# Patient Record
Sex: FEMALE | Race: WHITE | NOT HISPANIC OR LATINO | Employment: FULL TIME | URBAN - METROPOLITAN AREA
[De-identification: names, ages, dates, MRNs, and addresses within clinical notes are randomized per-mention and may not be internally consistent; named-entity substitution may affect disease eponyms.]

---

## 2017-02-19 ENCOUNTER — OFFICE VISIT (OUTPATIENT)
Dept: URGENT CARE | Facility: MEDICAL CENTER | Age: 52
End: 2017-02-19
Payer: COMMERCIAL

## 2017-02-19 PROCEDURE — G0382 LEV 3 HOSP TYPE B ED VISIT: HCPCS

## 2017-02-19 PROCEDURE — 99283 EMERGENCY DEPT VISIT LOW MDM: CPT

## 2017-04-24 ENCOUNTER — OFFICE VISIT (OUTPATIENT)
Dept: URGENT CARE | Facility: MEDICAL CENTER | Age: 52
End: 2017-04-24
Payer: COMMERCIAL

## 2017-04-24 PROCEDURE — G0382 LEV 3 HOSP TYPE B ED VISIT: HCPCS

## 2017-04-24 PROCEDURE — 99283 EMERGENCY DEPT VISIT LOW MDM: CPT

## 2017-05-24 ENCOUNTER — ALLSCRIPTS OFFICE VISIT (OUTPATIENT)
Dept: OTHER | Facility: OTHER | Age: 52
End: 2017-05-24

## 2019-04-24 ENCOUNTER — APPOINTMENT (EMERGENCY)
Dept: RADIOLOGY | Facility: HOSPITAL | Age: 54
End: 2019-04-24
Payer: COMMERCIAL

## 2019-04-24 ENCOUNTER — HOSPITAL ENCOUNTER (EMERGENCY)
Facility: HOSPITAL | Age: 54
Discharge: HOME | End: 2019-04-24
Attending: EMERGENCY MEDICINE
Payer: COMMERCIAL

## 2019-04-24 VITALS
OXYGEN SATURATION: 100 % | WEIGHT: 167.6 LBS | RESPIRATION RATE: 20 BRPM | TEMPERATURE: 98.5 F | SYSTOLIC BLOOD PRESSURE: 180 MMHG | DIASTOLIC BLOOD PRESSURE: 84 MMHG | HEART RATE: 83 BPM | BODY MASS INDEX: 29.69 KG/M2

## 2019-04-24 DIAGNOSIS — M54.9 MUSCULOSKELETAL BACK PAIN: ICD-10-CM

## 2019-04-24 DIAGNOSIS — S29.012A STRAIN OF THORACIC SPINE: Primary | ICD-10-CM

## 2019-04-24 PROCEDURE — 99284 EMERGENCY DEPT VISIT MOD MDM: CPT | Performed by: PHYSICIAN ASSISTANT

## 2019-04-24 PROCEDURE — 72070 X-RAY EXAM THORAC SPINE 2VWS: CPT

## 2019-04-24 PROCEDURE — 71046 X-RAY EXAM CHEST 2 VIEWS: CPT

## 2019-04-24 PROCEDURE — 96372 THER/PROPH/DIAG INJ SC/IM: CPT

## 2019-04-24 PROCEDURE — 99283 EMERGENCY DEPT VISIT LOW MDM: CPT

## 2019-04-24 RX ORDER — CYCLOBENZAPRINE HCL 10 MG
10 TABLET ORAL ONCE
Status: COMPLETED | OUTPATIENT
Start: 2019-04-24 | End: 2019-04-24

## 2019-04-24 RX ORDER — PREDNISONE 20 MG/1
60 TABLET ORAL ONCE
Status: COMPLETED | OUTPATIENT
Start: 2019-04-24 | End: 2019-04-24

## 2019-04-24 RX ORDER — KETOROLAC TROMETHAMINE 30 MG/ML
30 INJECTION, SOLUTION INTRAMUSCULAR; INTRAVENOUS ONCE
Status: COMPLETED | OUTPATIENT
Start: 2019-04-24 | End: 2019-04-24

## 2019-04-24 RX ORDER — OMEPRAZOLE 20 MG/1
20 CAPSULE, DELAYED RELEASE ORAL 2 TIMES DAILY
COMMUNITY
Start: 2018-03-15 | End: 2021-01-15 | Stop reason: SDUPTHER

## 2019-04-24 RX ORDER — FEXOFENADINE HCL 180 MG/1
180 TABLET ORAL DAILY
COMMUNITY

## 2019-04-24 RX ORDER — ESCITALOPRAM OXALATE 20 MG/1
20 TABLET ORAL ONCE
COMMUNITY
Start: 2018-10-15

## 2019-04-24 RX ORDER — PREDNISONE 20 MG/1
40 TABLET ORAL DAILY
Qty: 8 TABLET | Refills: 0 | Status: SHIPPED | OUTPATIENT
Start: 2019-04-24 | End: 2019-04-28

## 2019-04-24 RX ORDER — CYCLOBENZAPRINE HCL 5 MG
TABLET ORAL
Qty: 12 TABLET | Refills: 0 | Status: SHIPPED | OUTPATIENT
Start: 2019-04-24

## 2019-04-24 RX ADMIN — PREDNISONE 60 MG: 20 TABLET ORAL at 21:16

## 2019-04-24 RX ADMIN — KETOROLAC TROMETHAMINE 30 MG: 30 INJECTION, SOLUTION INTRAMUSCULAR; INTRAVENOUS at 21:17

## 2019-04-24 RX ADMIN — CYCLOBENZAPRINE HYDROCHLORIDE 10 MG: 10 TABLET, FILM COATED ORAL at 21:17

## 2019-04-25 ENCOUNTER — TELEPHONE (OUTPATIENT)
Dept: PHYSICAL THERAPY | Facility: OTHER | Age: 54
End: 2019-04-25

## 2019-04-30 ENCOUNTER — TELEPHONE (OUTPATIENT)
Dept: PHYSICAL THERAPY | Facility: OTHER | Age: 54
End: 2019-04-30

## 2020-11-11 ENCOUNTER — OFFICE VISIT (OUTPATIENT)
Dept: URGENT CARE | Age: 55
End: 2020-11-11
Payer: COMMERCIAL

## 2020-11-11 VITALS
TEMPERATURE: 98 F | SYSTOLIC BLOOD PRESSURE: 154 MMHG | DIASTOLIC BLOOD PRESSURE: 74 MMHG | OXYGEN SATURATION: 98 % | RESPIRATION RATE: 18 BRPM | HEART RATE: 62 BPM

## 2020-11-11 DIAGNOSIS — S05.02XA ABRASION OF LEFT CORNEA, INITIAL ENCOUNTER: Primary | ICD-10-CM

## 2020-11-11 PROCEDURE — 99283 EMERGENCY DEPT VISIT LOW MDM: CPT | Performed by: PHYSICIAN ASSISTANT

## 2020-11-11 PROCEDURE — G0382 LEV 3 HOSP TYPE B ED VISIT: HCPCS | Performed by: PHYSICIAN ASSISTANT

## 2020-11-11 RX ORDER — OFLOXACIN 3 MG/ML
1 SOLUTION/ DROPS OPHTHALMIC 4 TIMES DAILY
Qty: 1.4 ML | Refills: 0 | Status: SHIPPED | OUTPATIENT
Start: 2020-11-11 | End: 2020-11-18

## 2021-01-15 ENCOUNTER — APPOINTMENT (EMERGENCY)
Dept: CT IMAGING | Facility: HOSPITAL | Age: 56
End: 2021-01-15
Payer: COMMERCIAL

## 2021-01-15 ENCOUNTER — HOSPITAL ENCOUNTER (EMERGENCY)
Facility: HOSPITAL | Age: 56
Discharge: HOME | End: 2021-01-15
Attending: EMERGENCY MEDICINE | Admitting: EMERGENCY MEDICINE
Payer: COMMERCIAL

## 2021-01-15 VITALS
DIASTOLIC BLOOD PRESSURE: 56 MMHG | RESPIRATION RATE: 16 BRPM | SYSTOLIC BLOOD PRESSURE: 131 MMHG | TEMPERATURE: 98.6 F | OXYGEN SATURATION: 99 % | HEART RATE: 78 BPM

## 2021-01-15 DIAGNOSIS — R19.7 ACUTE DIARRHEA: Primary | ICD-10-CM

## 2021-01-15 DIAGNOSIS — R10.9 ABDOMINAL PAIN: ICD-10-CM

## 2021-01-15 LAB
ALBUMIN SERPL BCP-MCNC: 3.3 G/DL (ref 3.5–5)
ALP SERPL-CCNC: 113 U/L (ref 46–116)
ALT SERPL W P-5'-P-CCNC: 19 U/L (ref 12–78)
ANION GAP SERPL CALCULATED.3IONS-SCNC: 6 MMOL/L (ref 4–13)
AST SERPL W P-5'-P-CCNC: 15 U/L (ref 5–45)
BASOPHILS # BLD AUTO: 0.07 THOUSANDS/ΜL (ref 0–0.1)
BASOPHILS NFR BLD AUTO: 1 % (ref 0–1)
BILIRUB SERPL-MCNC: 0.26 MG/DL (ref 0.2–1)
BILIRUB UR QL STRIP: NEGATIVE
BUN SERPL-MCNC: 12 MG/DL (ref 5–25)
CALCIUM ALBUM COR SERPL-MCNC: 9.5 MG/DL (ref 8.3–10.1)
CALCIUM SERPL-MCNC: 8.9 MG/DL (ref 8.3–10.1)
CHLORIDE SERPL-SCNC: 105 MMOL/L (ref 100–108)
CLARITY UR: CLEAR
CO2 SERPL-SCNC: 29 MMOL/L (ref 21–32)
COLOR UR: YELLOW
CREAT SERPL-MCNC: 0.79 MG/DL (ref 0.6–1.3)
EOSINOPHIL # BLD AUTO: 0.22 THOUSAND/ΜL (ref 0–0.61)
EOSINOPHIL NFR BLD AUTO: 3 % (ref 0–6)
ERYTHROCYTE [DISTWIDTH] IN BLOOD BY AUTOMATED COUNT: 12.5 % (ref 11.6–15.1)
GFR SERPL CREATININE-BSD FRML MDRD: 85 ML/MIN/1.73SQ M
GLUCOSE SERPL-MCNC: 98 MG/DL (ref 65–140)
GLUCOSE UR STRIP-MCNC: NEGATIVE MG/DL
HCT VFR BLD AUTO: 41.3 % (ref 34.8–46.1)
HGB BLD-MCNC: 13.7 G/DL (ref 11.5–15.4)
HGB UR QL STRIP.AUTO: NEGATIVE
IMM GRANULOCYTES # BLD AUTO: 0.02 THOUSAND/UL (ref 0–0.2)
IMM GRANULOCYTES NFR BLD AUTO: 0 % (ref 0–2)
KETONES UR STRIP-MCNC: NEGATIVE MG/DL
LEUKOCYTE ESTERASE UR QL STRIP: NEGATIVE
LIPASE SERPL-CCNC: 97 U/L (ref 73–393)
LYMPHOCYTES # BLD AUTO: 2.33 THOUSANDS/ΜL (ref 0.6–4.47)
LYMPHOCYTES NFR BLD AUTO: 29 % (ref 14–44)
MCH RBC QN AUTO: 31.2 PG (ref 26.8–34.3)
MCHC RBC AUTO-ENTMCNC: 33.2 G/DL (ref 31.4–37.4)
MCV RBC AUTO: 94 FL (ref 82–98)
MONOCYTES # BLD AUTO: 0.68 THOUSAND/ΜL (ref 0.17–1.22)
MONOCYTES NFR BLD AUTO: 8 % (ref 4–12)
NEUTROPHILS # BLD AUTO: 4.84 THOUSANDS/ΜL (ref 1.85–7.62)
NEUTS SEG NFR BLD AUTO: 59 % (ref 43–75)
NITRITE UR QL STRIP: NEGATIVE
NRBC BLD AUTO-RTO: 0 /100 WBCS
PH UR STRIP.AUTO: 5.5 [PH] (ref 4.5–8)
PLATELET # BLD AUTO: 294 THOUSANDS/UL (ref 149–390)
PMV BLD AUTO: 10.5 FL (ref 8.9–12.7)
POTASSIUM SERPL-SCNC: 4.1 MMOL/L (ref 3.5–5.3)
PROT SERPL-MCNC: 7.3 G/DL (ref 6.4–8.2)
PROT UR STRIP-MCNC: NEGATIVE MG/DL
RBC # BLD AUTO: 4.39 MILLION/UL (ref 3.81–5.12)
SODIUM SERPL-SCNC: 140 MMOL/L (ref 136–145)
SP GR UR STRIP.AUTO: 1.02 (ref 1–1.03)
UROBILINOGEN UR QL STRIP.AUTO: 0.2 E.U./DL
WBC # BLD AUTO: 8.16 THOUSAND/UL (ref 4.31–10.16)

## 2021-01-15 PROCEDURE — 99284 EMERGENCY DEPT VISIT MOD MDM: CPT | Performed by: EMERGENCY MEDICINE

## 2021-01-15 PROCEDURE — 36415 COLL VENOUS BLD VENIPUNCTURE: CPT | Performed by: EMERGENCY MEDICINE

## 2021-01-15 PROCEDURE — 99284 EMERGENCY DEPT VISIT MOD MDM: CPT

## 2021-01-15 PROCEDURE — 85025 COMPLETE CBC W/AUTO DIFF WBC: CPT | Performed by: EMERGENCY MEDICINE

## 2021-01-15 PROCEDURE — 80053 COMPREHEN METABOLIC PANEL: CPT | Performed by: EMERGENCY MEDICINE

## 2021-01-15 PROCEDURE — 96374 THER/PROPH/DIAG INJ IV PUSH: CPT

## 2021-01-15 PROCEDURE — 81003 URINALYSIS AUTO W/O SCOPE: CPT

## 2021-01-15 PROCEDURE — 96361 HYDRATE IV INFUSION ADD-ON: CPT

## 2021-01-15 PROCEDURE — 83690 ASSAY OF LIPASE: CPT | Performed by: EMERGENCY MEDICINE

## 2021-01-15 PROCEDURE — 74177 CT ABD & PELVIS W/CONTRAST: CPT

## 2021-01-15 RX ORDER — ONDANSETRON 2 MG/ML
4 INJECTION INTRAMUSCULAR; INTRAVENOUS ONCE
Status: COMPLETED | OUTPATIENT
Start: 2021-01-15 | End: 2021-01-15

## 2021-01-15 RX ORDER — OMEPRAZOLE 20 MG/1
20 CAPSULE, DELAYED RELEASE ORAL 2 TIMES DAILY
Qty: 60 CAPSULE | Refills: 0 | Status: SHIPPED | OUTPATIENT
Start: 2021-01-15 | End: 2021-12-06

## 2021-01-15 RX ORDER — DICYCLOMINE HCL 20 MG
20 TABLET ORAL 2 TIMES DAILY
Qty: 20 TABLET | Refills: 0 | Status: SHIPPED | OUTPATIENT
Start: 2021-01-15

## 2021-01-15 RX ADMIN — IOHEXOL 100 ML: 350 INJECTION, SOLUTION INTRAVENOUS at 16:23

## 2021-01-15 RX ADMIN — SODIUM CHLORIDE 1000 ML: 0.9 INJECTION, SOLUTION INTRAVENOUS at 15:18

## 2021-01-15 RX ADMIN — ONDANSETRON 4 MG: 2 INJECTION INTRAMUSCULAR; INTRAVENOUS at 15:19

## 2021-01-15 NOTE — ED PROVIDER NOTES
History  Chief Complaint   Patient presents with    Diarrhea     starting last tuesday for 3 days and has been having lots of gas and 1 episopde of diarrhea again this am       81-year-old female comes in for evaluation diarrhea and abdominal pain  Patient states since last Tuesday she has been having lower abdominal pain and muscle episodes of loose stool  Patient denies any vomiting she does have some nausea no fever or chills denies any previous similar episodes although she does have a history of IBS  She also has a history of hiatal hernia  Of note patient is on doxycycline for URI however diarrhea started for the antibiotics      History provided by:  Patient   used: No    Diarrhea  Quality:  Explosive and watery  Severity:  Severe  Onset quality:  Sudden  Duration:  3 days  Timing:  Constant  Progression:  Worsening  Ineffective treatments:  None tried  Associated symptoms: abdominal pain    Associated symptoms: no arthralgias, no diaphoresis, no fever, no headaches and no vomiting    Risk factors: recent antibiotic use (Diarrhea started before the antibiotics antibiotics for URI)        Prior to Admission Medications   Prescriptions Last Dose Informant Patient Reported? Taking?    cyclobenzaprine (FLEXERIL) 5 mg tablet   No No   Si-2 PO TID PRN   escitalopram (LEXAPRO) 20 mg tablet   Yes No   Sig: Take 20 mg by mouth once   fexofenadine (ALLEGRA ALLERGY) 180 MG tablet  Self Yes No   Sig: Take 180 mg by mouth daily    omeprazole (PRILOSEC) 20 mg delayed release capsule   Yes No   Sig: Take 20 mg by mouth 2 (two) times a day   omeprazole (PriLOSEC) 20 mg delayed release capsule   No No   Sig: Take 1 capsule (20 mg total) by mouth 2 (two) times a day      Facility-Administered Medications: None       Past Medical History:   Diagnosis Date    Asthma     Papanicolaou smear 2019    Neg       Past Surgical History:   Procedure Laterality Date    BREAST SURGERY Left      SECTION      COLONOSCOPY  2016    DILATION AND CURETTAGE OF UTERUS      X2    ENDOMETRIAL ABLATION      MAMMO (HISTORICAL)  08/2019    SINUS SURGERY      X2    TUBAL LIGATION  1993       Family History   Problem Relation Age of Onset    Brain cancer Father     Lung cancer Father     Cancer Father         Pharynx    Breast cancer Other      I have reviewed and agree with the history as documented  E-Cigarette/Vaping     E-Cigarette/Vaping Substances     Social History     Tobacco Use    Smoking status: Current Every Day Smoker     Packs/day: 1 00    Smokeless tobacco: Never Used   Substance Use Topics    Alcohol use: Not Currently    Drug use: Never       Review of Systems   Constitutional: Negative for diaphoresis, fatigue and fever  HENT: Negative for congestion and ear pain  Eyes: Negative for discharge and redness  Respiratory: Negative for apnea, cough, shortness of breath and wheezing  Cardiovascular: Negative for chest pain  Gastrointestinal: Positive for abdominal pain and diarrhea  Negative for vomiting  Endocrine: Negative for cold intolerance and polydipsia  Genitourinary: Negative for difficulty urinating and hematuria  Musculoskeletal: Negative for arthralgias and back pain  Skin: Negative for color change and rash  Allergic/Immunologic: Negative for environmental allergies and immunocompromised state  Neurological: Negative for numbness and headaches  Hematological: Negative for adenopathy  Does not bruise/bleed easily  Psychiatric/Behavioral: Negative for agitation and behavioral problems  Physical Exam  Physical Exam  Vitals signs and nursing note reviewed  Constitutional:       Appearance: Normal appearance  She is well-developed  She is not toxic-appearing  HENT:      Head: Normocephalic and atraumatic        Right Ear: Tympanic membrane and external ear normal       Left Ear: Tympanic membrane and external ear normal       Nose: Nose normal  No nasal deformity or rhinorrhea  Mouth/Throat:      Dentition: Normal dentition  Pharynx: Uvula midline  Eyes:      General: Lids are normal          Right eye: No discharge  Left eye: No discharge  Conjunctiva/sclera: Conjunctivae normal       Pupils: Pupils are equal, round, and reactive to light  Neck:      Musculoskeletal: Normal range of motion and neck supple  Vascular: No carotid bruit or JVD  Trachea: Trachea normal    Cardiovascular:      Rate and Rhythm: Normal rate and regular rhythm  No extrasystoles are present  Chest Wall: PMI is not displaced  Pulses: Normal pulses  Pulmonary:      Effort: Pulmonary effort is normal  No accessory muscle usage or respiratory distress  Breath sounds: Normal breath sounds  No wheezing, rhonchi or rales  Abdominal:      General: Bowel sounds are normal       Palpations: Abdomen is soft  Abdomen is not rigid  There is no mass  Tenderness: There is no abdominal tenderness  There is no guarding or rebound  Musculoskeletal:      Right shoulder: She exhibits normal range of motion, no bony tenderness, no swelling and no deformity  Cervical back: Normal  She exhibits normal range of motion, no tenderness, no bony tenderness and no deformity  Lymphadenopathy:      Cervical: No cervical adenopathy  Skin:     General: Skin is warm and dry  Findings: No rash  Neurological:      Mental Status: She is alert and oriented to person, place, and time  GCS: GCS eye subscore is 4  GCS verbal subscore is 5  GCS motor subscore is 6  Cranial Nerves: No cranial nerve deficit  Sensory: No sensory deficit  Deep Tendon Reflexes: Reflexes are normal and symmetric     Psychiatric:         Speech: Speech normal          Behavior: Behavior normal          Vital Signs  ED Triage Vitals [01/15/21 1336]   Temperature Pulse Respirations Blood Pressure SpO2   98 6 °F (37 °C) 78 16 131/56 99 %      Temp Source Heart Rate Source Patient Position - Orthostatic VS BP Location FiO2 (%)   Temporal -- -- Left arm --      Pain Score       7           Vitals:    01/15/21 1336   BP: 131/56   Pulse: 78         Visual Acuity      ED Medications  Medications   ondansetron (ZOFRAN) injection 4 mg (4 mg Intravenous Given 1/15/21 1519)   sodium chloride 0 9 % bolus 1,000 mL (1,000 mL Intravenous New Bag 1/15/21 1518)   iohexol (OMNIPAQUE) 350 MG/ML injection (SINGLE-DOSE) 100 mL (100 mL Intravenous Given 1/15/21 1623)       Diagnostic Studies  Results Reviewed     Procedure Component Value Units Date/Time    Comprehensive metabolic panel [[id number]]  (Abnormal) Collected: 01/15/21 1517    Lab Status: Final result Specimen: Blood from Arm, Left Updated: 01/15/21 1558     Sodium 140 mmol/L      Potassium 4 1 mmol/L      Chloride 105 mmol/L      CO2 29 mmol/L      ANION GAP 6 mmol/L      BUN 12 mg/dL      Creatinine 0 79 mg/dL      Glucose 98 mg/dL      Calcium 8 9 mg/dL      Corrected Calcium 9 5 mg/dL      AST 15 U/L      ALT 19 U/L      Alkaline Phosphatase 113 U/L      Total Protein 7 3 g/dL      Albumin 3 3 g/dL      Total Bilirubin 0 26 mg/dL      eGFR 85 ml/min/1 73sq m     Narrative:      [place] guidelines for Chronic Kidney Disease (CKD):     Stage 1 with normal or high GFR (GFR > 90 mL/min/1 73 square meters)    Stage 2 Mild CKD (GFR = 60-89 mL/min/1 73 square meters)    Stage 3A Moderate CKD (GFR = 45-59 mL/min/1 73 square meters)    Stage 3B Moderate CKD (GFR = 30-44 mL/min/1 73 square meters)    Stage 4 Severe CKD (GFR = 15-29 mL/min/1 73 square meters)    Stage 5 End Stage CKD (GFR <15 mL/min/1 73 square meters)  Note: GFR calculation is accurate only with a steady state creatinine    Lipase [[id number]]  (Normal) Collected: 01/15/21 1517    Lab Status: Final result Specimen: Blood from Arm, Left Updated: 01/15/21 1558     Lipase 97 u/L     CBC and differential [[id number]] Collected: 01/15/21 1517    Lab Status: Final result Specimen: Blood from Arm, Left Updated: 01/15/21 1537     WBC 8 16 Thousand/uL      RBC 4 39 Million/uL      Hemoglobin 13 7 g/dL      Hematocrit 41 3 %      MCV 94 fL      MCH 31 2 pg      MCHC 33 2 g/dL      RDW 12 5 %      MPV 10 5 fL      Platelets 100 Thousands/uL      nRBC 0 /100 WBCs      Neutrophils Relative 59 %      Immat GRANS % 0 %      Lymphocytes Relative 29 %      Monocytes Relative 8 %      Eosinophils Relative 3 %      Basophils Relative 1 %      Neutrophils Absolute 4 84 Thousands/µL      Immature Grans Absolute 0 02 Thousand/uL      Lymphocytes Absolute 2 33 Thousands/µL      Monocytes Absolute 0 68 Thousand/µL      Eosinophils Absolute 0 22 Thousand/µL      Basophils Absolute 0 07 Thousands/µL     Urine Macroscopic, POC [[id number]] Collected: 01/15/21 1534    Lab Status: Final result Specimen: Urine Updated: 01/15/21 1536     Color, UA Yellow     Clarity, UA Clear     pH, UA 5 5     Leukocytes, UA Negative     Nitrite, UA Negative     Protein, UA Negative mg/dl      Glucose, UA Negative mg/dl      Ketones, UA Negative mg/dl      Urobilinogen, UA 0 2 E U /dl      Bilirubin, UA Negative     Blood, UA Negative     Specific Gravity, UA 1 020    Narrative:      CLINITEK RESULT    Clostridium difficile toxin by PCR with EIA [[id number]]     Lab Status: No result Specimen: Stool     Stool Enteric Bacterial Panel by PCR [[id number]]     Lab Status: No result Specimen: Stool                  CT abdomen pelvis with contrast   Final Result by Stephanie Mejia DO (01/15 1645)      No acute intra-abdominal abnormality  Colonic diverticulosis without evidence of diverticulitis  Hepatomegaly              Workstation performed: [id number]                    Procedures  Procedures         ED Course                                           MDM  Number of Diagnoses or Management Options  Abdominal pain: new and requires workup  Acute diarrhea: new and requires workup Amount and/or Complexity of Data Reviewed  Clinical lab tests: ordered and reviewed  Tests in the radiology section of CPT®: ordered and reviewed  Tests in the medicine section of CPT®: ordered and reviewed  Independent visualization of images, tracings, or specimens: yes    Patient Progress  Patient progress: stable      Disposition  Final diagnoses:   Acute diarrhea   Abdominal pain     Time reflects when diagnosis was documented in both MDM as applicable and the Disposition within this note     Time User Action Codes Description Comment    1/15/2021  4:48 PM Susan Sheila DEAL Add [R19 7] Acute diarrhea     1/15/2021  4:48 PM Maria Enamorado Add [R10 9] Abdominal pain       ED Disposition     ED Disposition Condition Date/Time Comment    Discharge Stable Fri Alfred 15, 2021  4:48 PM Linda Aceves discharge to home/self care  Follow-up Information     Follow up With Specialties Details Why Contact Info    Eliz Barrera MD Internal Medicine Schedule an appointment as soon as possible for a visit   [phone] [place] [address]  [phone]            Patient's Medications   Discharge Prescriptions    DICYCLOMINE (BENTYL) 20 MG TABLET    Take 1 tablet (20 mg total) by mouth 2 (two) times a day       Start Date: 1/15/2021 End Date: --       Order Dose: 20 mg       Quantity: 20 tablet    Refills: 0     No discharge procedures on file      PDMP Review     None          ED Provider  Electronically Signed by           Cory Teixeira DO  01/15/21 7705

## 2021-04-27 ENCOUNTER — OFFICE VISIT (OUTPATIENT)
Dept: URGENT CARE | Age: 56
End: 2021-04-27
Payer: COMMERCIAL

## 2021-04-27 VITALS
DIASTOLIC BLOOD PRESSURE: 102 MMHG | SYSTOLIC BLOOD PRESSURE: 180 MMHG | RESPIRATION RATE: 20 BRPM | OXYGEN SATURATION: 99 % | HEART RATE: 72 BPM | TEMPERATURE: 97 F

## 2021-04-27 DIAGNOSIS — M77.12 LATERAL EPICONDYLITIS OF LEFT ELBOW: Primary | ICD-10-CM

## 2021-04-27 PROCEDURE — G0382 LEV 3 HOSP TYPE B ED VISIT: HCPCS | Performed by: NURSE PRACTITIONER

## 2021-04-27 RX ORDER — PREDNISONE 20 MG/1
TABLET ORAL
Qty: 18 TABLET | Refills: 0 | Status: SHIPPED | OUTPATIENT
Start: 2021-04-27 | End: 2021-12-06 | Stop reason: ALTCHOICE

## 2021-04-27 RX ORDER — ATORVASTATIN CALCIUM 20 MG/1
20 TABLET, FILM COATED ORAL DAILY
COMMUNITY
Start: 2021-03-23 | End: 2022-03-23

## 2021-04-27 RX ORDER — FENOFIBRATE 67 MG/1
67 CAPSULE ORAL DAILY
COMMUNITY
Start: 2021-03-23 | End: 2022-03-23

## 2021-04-27 RX ORDER — VENLAFAXINE HYDROCHLORIDE 75 MG/1
37.5 CAPSULE, EXTENDED RELEASE ORAL DAILY
COMMUNITY
Start: 2021-03-11 | End: 2022-03-11

## 2021-04-27 RX ORDER — ASPIRIN 81 MG/1
81 TABLET, CHEWABLE ORAL DAILY
COMMUNITY
Start: 2021-03-23 | End: 2022-03-23

## 2021-04-27 RX ORDER — AMLODIPINE BESYLATE 5 MG/1
5 TABLET ORAL DAILY
COMMUNITY
Start: 2021-03-23 | End: 2022-03-23

## 2021-04-27 RX ORDER — HYDROXYZINE PAMOATE 50 MG/1
50 CAPSULE ORAL
COMMUNITY
Start: 2021-04-12 | End: 2021-05-12

## 2021-04-27 NOTE — PATIENT INSTRUCTIONS
Tennis Elbow   WHAT YOU NEED TO KNOW:   Tennis elbow is inflammation of the tendons in your elbow  Tendons are strong tissues that connect muscle to bone  DISCHARGE INSTRUCTIONS:   Return to the emergency department if:   · You suddenly have no feeling in your arm, hand, or fingers  · You suddenly cannot move your arm, wrist, hand, or fingers  Contact your healthcare provider if:   · You have a fever  · You have more pain or weakness in your arm, wrist, hand, or fingers  · You have new numbness or tingling in your arm, hand, or fingers  · You have questions or concerns about your condition or care  Medicines:   · Acetaminophen  decreases pain and fever  It is available without a doctor's order  Ask how much to take and how often to take it  Follow directions  Read the labels of all other medicines you are using to see if they also contain acetaminophen, or ask your doctor or pharmacist  Acetaminophen can cause liver damage if not taken correctly  Do not use more than 4 grams (4,000 milligrams) total of acetaminophen in one day  · NSAIDs , such as ibuprofen, help decrease swelling, pain, and fever  This medicine is available with or without a doctor's order  NSAIDs can cause stomach bleeding or kidney problems in certain people  If you take blood thinner medicine, always ask your healthcare provider if NSAIDs are safe for you  Always read the medicine label and follow directions  · Take your medicine as directed  Contact your healthcare provider if you think your medicine is not helping or if you have side effects  Tell him or her if you are allergic to any medicine  Keep a list of the medicines, vitamins, and herbs you take  Include the amounts, and when and why you take them  Bring the list or the pill bottles to follow-up visits  Carry your medicine list with you in case of an emergency      Physical therapy:  A physical therapist teaches you exercises to help improve movement and strength, and to decrease pain  Self-care:   · Wear your support device as directed  Devices, such as an arm strap, brace, or splint, help limit your arm movement  They also decrease pain and help prevent more damage to your tendon  Ask your healthcare provider how to care for your arm while you wear a brace or splint  · Rest your injured arm  and avoid activities that cause pain  This will help your tendons heal     · Apply ice on your elbow  for 15 to 20 minutes every hour or as directed  Use an ice pack, or put crushed ice in a plastic bag  Cover it with a towel before you apply it to your skin  Ice helps prevent tissue damage and decreases swelling and pain  · Elevate your elbow  above the level of your heart as often as you can  This will help decrease swelling and pain  Prop your elbow on pillows or blankets to keep it elevated comfortably  Follow up with your healthcare provider as directed:  Write down your questions so you remember to ask them during your visits  © Copyright [address] Information is for End User's use only and may not be sold, redistributed or otherwise used for commercial purposes  All illustrations and images included in CareNotes® are the copyrighted property of A D A M , Inc  or [facility] Aleyda Toledo   The above information is an  only  It is not intended as medical advice for individual conditions or treatments  Talk to your doctor, nurse or pharmacist before following any medical regimen to see if it is safe and effective for you

## 2021-04-27 NOTE — PROGRESS NOTES
[facility]        NAME: Eliel Ovalle is a 55 y o  female  : 1965    MRN: [id number]  DATE: 2021  TIME: 4:33 PM    Assessment and Plan   Lateral epicondylitis of left elbow [M77 12]  1  Lateral epicondylitis of left elbow  Brace    predniSONE 20 mg tablet         Patient Instructions     Elbow brace applied  Take med as directed  Follow up with PCP in 3-5 days  Proceed to  ER if symptoms worsen  Chief Complaint     Chief Complaint   Patient presents with    Arm Pain     pt states having left elbow pain for 2 weeks, getting worse, pain increases with movement of left hand         History of Present Illness       HPI   Reports left elbow pain x 2-3 weeks  Says the pain started while at work  She works at a clothing store  Lots of repetitive movement  Pain is of moderate to severe intensity, worse with use of the left arm, better with rest  Pain increases with both fine and gross movements  Review of Systems   Review of Systems   Constitutional: Negative for chills and fever  Musculoskeletal: Positive for arthralgias (left elbow) and myalgias (left arm)  Negative for joint swelling  Skin: Negative for color change, rash and wound  Neurological: Positive for weakness (bc of pain of the left arm)  Negative for numbness           Current Medications       Current Outpatient Medications:     amLODIPine (NORVASC) 5 mg tablet, Take 5 mg by mouth daily, Disp: , Rfl:     aspirin 81 mg chewable tablet, Chew 81 mg daily, Disp: , Rfl:     atorvastatin (LIPITOR) 20 mg tablet, Take 20 mg by mouth daily, Disp: , Rfl:     dicyclomine (BENTYL) 20 mg tablet, Take 1 tablet (20 mg total) by mouth 2 (two) times a day, Disp: 20 tablet, Rfl: 0    fenofibrate micronized (LOFIBRA) 67 MG capsule, Take 67 mg by mouth daily, Disp: , Rfl:     fexofenadine (ALLEGRA ALLERGY) 180 MG tablet, Take 180 mg by mouth daily , Disp: , Rfl:     hydrOXYzine pamoate (VISTARIL) 50 mg capsule, Take 50 mg by mouth, Disp: , Rfl:     venlafaxine (EFFEXOR-XR) 37 5 mg 24 hr capsule, Take 37 5 mg by mouth daily, Disp: , Rfl:     cyclobenzaprine (FLEXERIL) 5 mg tablet, 1-2 PO TID PRN (Patient not taking: Reported on 2021), Disp: 12 tablet, Rfl: 0    escitalopram (LEXAPRO) 20 mg tablet, Take 20 mg by mouth once, Disp: , Rfl:     omeprazole (PriLOSEC) 20 mg delayed release capsule, Take 1 capsule (20 mg total) by mouth 2 (two) times a day, Disp: 60 capsule, Rfl: 0    predniSONE 20 mg tablet, 1 tab TID x 3 days, then BID x 3 days, then daily x 3 days, Disp: 18 tablet, Rfl: 0    Current Allergies     Allergies as of 2021 - Reviewed 2021   Allergen Reaction Noted    Cefuroxime Hives 2013            The following portions of the patient's history were reviewed and updated as appropriate: allergies, current medications, past family history, past medical history, past social history, past surgical history and problem list      Past Medical History:   Diagnosis Date    Asthma     Papanicolaou smear 2019    Neg       Past Surgical History:   Procedure Laterality Date    BREAST SURGERY Left      SECTION      COLONOSCOPY      DILATION AND CURETTAGE OF UTERUS      X2    ENDOMETRIAL ABLATION      MAMMO (HISTORICAL)  2019    SINUS SURGERY      X2    TUBAL LIGATION         Family History   Problem Relation Age of Onset    Brain cancer Father     Lung cancer Father     Cancer Father         Pharynx    Breast cancer Other          Medications have been verified  Objective   BP (!) 180/102   Pulse 72   Temp (!) 97 °F (36 1 °C)   Resp 20   SpO2 99%   No LMP recorded  Patient is postmenopausal        Physical Exam     Physical Exam  Constitutional:       Appearance: She is not ill-appearing or diaphoretic  Musculoskeletal:         General: Tenderness (with palpation of the left forearm and left elbow muscles, consistent with tennis elbow) present  No swelling     Skin: Coloration: Skin is not jaundiced  Findings: No bruising, lesion or rash  Neurological:      Mental Status: She is alert

## 2021-05-03 ENCOUNTER — APPOINTMENT (OUTPATIENT)
Dept: URGENT CARE | Age: 56
End: 2021-05-03
Payer: OTHER MISCELLANEOUS

## 2021-05-03 PROCEDURE — G0382 LEV 3 HOSP TYPE B ED VISIT: HCPCS | Performed by: PHYSICIAN ASSISTANT

## 2021-05-03 PROCEDURE — 99283 EMERGENCY DEPT VISIT LOW MDM: CPT | Performed by: PHYSICIAN ASSISTANT

## 2021-05-10 ENCOUNTER — APPOINTMENT (OUTPATIENT)
Dept: URGENT CARE | Age: 56
End: 2021-05-10
Payer: OTHER MISCELLANEOUS

## 2021-05-10 PROCEDURE — 99213 OFFICE O/P EST LOW 20 MIN: CPT | Performed by: NURSE PRACTITIONER

## 2021-05-14 ENCOUNTER — APPOINTMENT (OUTPATIENT)
Dept: URGENT CARE | Age: 56
End: 2021-05-14
Payer: OTHER MISCELLANEOUS

## 2021-05-14 PROCEDURE — 99213 OFFICE O/P EST LOW 20 MIN: CPT | Performed by: PHYSICIAN ASSISTANT

## 2021-05-24 ENCOUNTER — APPOINTMENT (OUTPATIENT)
Dept: URGENT CARE | Age: 56
End: 2021-05-24
Payer: OTHER MISCELLANEOUS

## 2021-05-24 PROCEDURE — 99213 OFFICE O/P EST LOW 20 MIN: CPT | Performed by: PHYSICIAN ASSISTANT

## 2021-06-03 ENCOUNTER — APPOINTMENT (OUTPATIENT)
Dept: URGENT CARE | Age: 56
End: 2021-06-03
Payer: OTHER MISCELLANEOUS

## 2021-06-03 PROCEDURE — 99213 OFFICE O/P EST LOW 20 MIN: CPT | Performed by: PHYSICIAN ASSISTANT

## 2021-06-21 ENCOUNTER — APPOINTMENT (OUTPATIENT)
Dept: URGENT CARE | Age: 56
End: 2021-06-21
Payer: OTHER MISCELLANEOUS

## 2021-06-21 ENCOUNTER — APPOINTMENT (OUTPATIENT)
Dept: RADIOLOGY | Age: 56
End: 2021-06-21
Payer: OTHER MISCELLANEOUS

## 2021-06-21 DIAGNOSIS — T14.90XA INJURY: ICD-10-CM

## 2021-06-21 DIAGNOSIS — T14.90XA INJURY: Primary | ICD-10-CM

## 2021-06-21 PROCEDURE — 73080 X-RAY EXAM OF ELBOW: CPT

## 2021-06-21 PROCEDURE — 99213 OFFICE O/P EST LOW 20 MIN: CPT | Performed by: PREVENTIVE MEDICINE

## 2021-07-05 ENCOUNTER — APPOINTMENT (OUTPATIENT)
Dept: URGENT CARE | Age: 56
End: 2021-07-05
Payer: OTHER MISCELLANEOUS

## 2021-07-05 PROCEDURE — 99213 OFFICE O/P EST LOW 20 MIN: CPT | Performed by: PREVENTIVE MEDICINE

## 2021-12-06 ENCOUNTER — TELEMEDICINE (OUTPATIENT)
Dept: INTERNAL MEDICINE CLINIC | Facility: CLINIC | Age: 56
End: 2021-12-06
Payer: COMMERCIAL

## 2021-12-06 VITALS — BODY MASS INDEX: 29.44 KG/M2 | WEIGHT: 160 LBS | HEIGHT: 62 IN

## 2021-12-06 DIAGNOSIS — J01.10 ACUTE FRONTAL SINUSITIS, RECURRENCE NOT SPECIFIED: Primary | ICD-10-CM

## 2021-12-06 PROCEDURE — 99442 PR PHYS/QHP TELEPHONE EVALUATION 11-20 MIN: CPT | Performed by: NURSE PRACTITIONER

## 2021-12-06 RX ORDER — AZITHROMYCIN 250 MG/1
TABLET, FILM COATED ORAL
Qty: 6 TABLET | Refills: 0 | Status: SHIPPED | OUTPATIENT
Start: 2021-12-06 | End: 2021-12-11

## 2021-12-06 RX ORDER — FEXOFENADINE HCL AND PSEUDOEPHEDRINE HCI 180; 240 MG/1; MG/1
1 TABLET, EXTENDED RELEASE ORAL DAILY
COMMUNITY
Start: 2021-07-28 | End: 2021-12-06 | Stop reason: SDUPTHER

## 2021-12-06 RX ORDER — TRAZODONE HYDROCHLORIDE 50 MG/1
TABLET ORAL
COMMUNITY
Start: 2021-10-08

## 2022-01-26 ENCOUNTER — OFFICE VISIT (OUTPATIENT)
Dept: URGENT CARE | Age: 57
End: 2022-01-26
Payer: COMMERCIAL

## 2022-01-26 VITALS
HEART RATE: 83 BPM | RESPIRATION RATE: 20 BRPM | SYSTOLIC BLOOD PRESSURE: 168 MMHG | TEMPERATURE: 97.3 F | OXYGEN SATURATION: 99 % | DIASTOLIC BLOOD PRESSURE: 97 MMHG

## 2022-01-26 DIAGNOSIS — M54.9 ACUTE BACK PAIN, UNSPECIFIED BACK LOCATION, UNSPECIFIED BACK PAIN LATERALITY: Primary | ICD-10-CM

## 2022-01-26 PROCEDURE — G0382 LEV 3 HOSP TYPE B ED VISIT: HCPCS | Performed by: STUDENT IN AN ORGANIZED HEALTH CARE EDUCATION/TRAINING PROGRAM

## 2022-01-26 RX ORDER — CYCLOBENZAPRINE HCL 10 MG
10 TABLET ORAL 3 TIMES DAILY PRN
Qty: 30 TABLET | Refills: 0 | Status: SHIPPED | OUTPATIENT
Start: 2022-01-26

## 2022-01-26 NOTE — PROGRESS NOTES
[facility]        NAME: Candis Whitten is a 55 y o  female  : 1965    MRN: [id number]  DATE: 2022  TIME: 4:34 PM    Assessment and Plan   Acute back pain, unspecified back location, unspecified back pain laterality [M54 9]  1  Acute back pain, unspecified back location, unspecified back pain laterality  cyclobenzaprine (FLEXERIL) 10 mg tablet         Patient Instructions       Follow up with PCP in 3-5 days  Proceed to  ER if symptoms worsen  Chief Complaint     Chief Complaint   Patient presents with    Back Pain     pt states slipped and fell on ice Saturday, landed on back, c/o continued back pain from shoulders to lower back  Denies LOC, states took off from work the past couple of days and back is slightly better         History of Present Illness       Patient slipped and fell on ice on  landing on her back  Since then she has been complaining of low back pain ranging from her shoulders to her lower lumbar area  Patient denied her head or lose consciousness  She did take a few days off of work is feeling slightly better but bike to be checked out  Patient denies any weakness numbness tingling or loss of sensation in her lower extremities        Review of Systems   Review of Systems  Per hpi     Current Medications       Current Outpatient Medications:     amLODIPine (NORVASC) 5 mg tablet, Take 5 mg by mouth daily, Disp: , Rfl:     aspirin 81 mg chewable tablet, Chew 81 mg daily, Disp: , Rfl:     atorvastatin (LIPITOR) 20 mg tablet, Take 20 mg by mouth daily, Disp: , Rfl:     cyclobenzaprine (FLEXERIL) 10 mg tablet, Take 1 tablet (10 mg total) by mouth 3 (three) times a day as needed for muscle spasms, Disp: 30 tablet, Rfl: 0    cyclobenzaprine (FLEXERIL) 5 mg tablet, 1-2 PO TID PRN, Disp: 12 tablet, Rfl: 0    dicyclomine (BENTYL) 20 mg tablet, Take 1 tablet (20 mg total) by mouth 2 (two) times a day, Disp: 20 tablet, Rfl: 0    escitalopram (LEXAPRO) 20 mg tablet, Take 20 mg by mouth once, Disp: , Rfl:     fenofibrate micronized (LOFIBRA) 67 MG capsule, Take 67 mg by mouth daily, Disp: , Rfl:     fexofenadine (ALLEGRA ALLERGY) 180 MG tablet, Take 180 mg by mouth daily , Disp: , Rfl:     hydrOXYzine pamoate (VISTARIL) 50 mg capsule, Take 50 mg by mouth, Disp: , Rfl:     omeprazole (PriLOSEC) 20 mg delayed release capsule, Take 1 capsule (20 mg total) by mouth 2 (two) times a day, Disp: 60 capsule, Rfl: 0    traZODone (DESYREL) 50 mg tablet, , Disp: , Rfl:     venlafaxine (EFFEXOR-XR) 75 mg 24 hr capsule, Take 37 5 mg by mouth daily  , Disp: , Rfl:     Current Allergies     Allergies as of 2022 - Reviewed 2022   Allergen Reaction Noted    Cefuroxime Hives 2013            The following portions of the patient's history were reviewed and updated as appropriate: allergies, current medications, past family history, past medical history, past social history, past surgical history and problem list      Past Medical History:   Diagnosis Date    Asthma     Papanicolaou smear 2019    Neg       Past Surgical History:   Procedure Laterality Date    BREAST SURGERY Left      SECTION      COLONOSCOPY  2016    DILATION AND CURETTAGE OF UTERUS      X2    ENDOMETRIAL ABLATION      MAMMO (HISTORICAL)  2019    SINUS SURGERY      X2    TUBAL LIGATION         Family History   Problem Relation Age of Onset    Brain cancer Father     Lung cancer Father     Cancer Father         Pharynx    Breast cancer Other          Medications have been verified  Objective   /97   Pulse 83   Temp (!) 97 3 °F (36 3 °C)   Resp 20   SpO2 99%   No LMP recorded  Patient is postmenopausal        Physical Exam     Physical Exam  Constitutional:       Appearance: She is well-developed  HENT:      Head: Normocephalic and atraumatic  Cardiovascular:      Rate and Rhythm: Normal rate and regular rhythm        Heart sounds: Normal heart sounds  Pulmonary:      Effort: Pulmonary effort is normal  No respiratory distress  Breath sounds: Normal breath sounds  No wheezing  Chest:      Chest wall: No tenderness  Abdominal:      General: Bowel sounds are normal  There is no distension  Palpations: Abdomen is soft  There is no mass  Tenderness: There is no abdominal tenderness  Musculoskeletal:         General: Tenderness present  Cervical back: Normal range of motion and neck supple  Comments: L flexion limited byy pain   Lymphadenopathy:      Cervical: No cervical adenopathy  Skin:     General: Skin is warm  Neurological:      Mental Status: She is alert and oriented to person, place, and time

## 2022-01-26 NOTE — LETTER
January 26, 2022     Patient: Radha Alberto   YOB: 1965   Date of Visit: 1/26/2022       To Whom It May Concern: It is my medical opinion that Daniela Livingston be excused from work duties from 01/26/2022 up until 01/30/2022  If you have any questions or concerns, please don't hesitate to call           Sincerely,        Jaz Larose MD    CC: No Recipients

## 2022-11-16 ENCOUNTER — OFFICE VISIT (OUTPATIENT)
Dept: URGENT CARE | Age: 57
End: 2022-11-16

## 2022-11-16 VITALS
SYSTOLIC BLOOD PRESSURE: 163 MMHG | TEMPERATURE: 97.6 F | OXYGEN SATURATION: 97 % | RESPIRATION RATE: 18 BRPM | HEART RATE: 81 BPM | DIASTOLIC BLOOD PRESSURE: 74 MMHG

## 2022-11-16 DIAGNOSIS — H69.82 EUSTACHIAN TUBE DYSFUNCTION, LEFT: Primary | ICD-10-CM

## 2022-11-16 NOTE — PROGRESS NOTES
[address] [facility]        NAME: Tanvi Ritter is a 65 y o  female  : 1965    MRN: [id number]  DATE: 2022  TIME: 5:12 PM    Assessment and Plan   Eustachian tube dysfunction, left [H69 82]  1  Eustachian tube dysfunction, left  Ambulatory Referral to Otolaryngology            Patient Instructions     Referral to ENT for worsening eustachian tube dysfunction  Avoid using Q-tips  Follow up with PCP in 3-5 days  Proceed to  ER if symptoms worsen  Chief Complaint     Chief Complaint   Patient presents with   • Earache   • Headache     LEFT EAR BLEEDING AND HEADACHE  FOR 2 MONTHS         History of Present Illness       HPI   Presents to clinic with complaint of left ear pain, headache, decreased hearing for about 2 months  States in the last couple of days, when using Q-tips there is a bit of blood on the Q-tip  Mostly from the left ear  No trauma to the ear  States she has been told in the past that she has eustachian tube dysfunction  Now loosing hearing in the left ear    Review of Systems   Review of Systems   Constitutional: Negative for chills and fever  HENT: Positive for congestion (sometimes), ear discharge (intermittent blood, with use of Q tips) and ear pain (left ear)  Negative for sinus pressure, sore throat and trouble swallowing  Respiratory: Negative for cough and wheezing  Cardiovascular: Negative for chest pain  Neurological: Positive for headaches  Negative for dizziness and light-headedness           Current Medications       Current Outpatient Medications:   •  amLODIPine (NORVASC) 5 mg tablet, Take 5 mg by mouth daily, Disp: , Rfl:   •  aspirin 81 mg chewable tablet, Chew 81 mg daily, Disp: , Rfl:   •  atorvastatin (LIPITOR) 20 mg tablet, Take 20 mg by mouth daily, Disp: , Rfl:   •  cyclobenzaprine (FLEXERIL) 10 mg tablet, Take 1 tablet (10 mg total) by mouth 3 (three) times a day as needed for muscle spasms, Disp: 30 tablet, Rfl: 0  •  cyclobenzaprine (FLEXERIL) 5 mg tablet, 1-2 PO TID PRN, Disp: 12 tablet, Rfl: 0  •  dicyclomine (BENTYL) 20 mg tablet, Take 1 tablet (20 mg total) by mouth 2 (two) times a day, Disp: 20 tablet, Rfl: 0  •  escitalopram (LEXAPRO) 20 mg tablet, Take 20 mg by mouth once, Disp: , Rfl:   •  fexofenadine (ALLEGRA ALLERGY) 180 MG tablet, Take 180 mg by mouth daily , Disp: , Rfl:   •  hydrOXYzine pamoate (VISTARIL) 50 mg capsule, Take 50 mg by mouth, Disp: , Rfl:   •  omeprazole (PriLOSEC) 20 mg delayed release capsule, Take 1 capsule (20 mg total) by mouth 2 (two) times a day, Disp: 60 capsule, Rfl: 0  •  traZODone (DESYREL) 50 mg tablet, , Disp: , Rfl:   •  venlafaxine (EFFEXOR-XR) 75 mg 24 hr capsule, Take 37 5 mg by mouth daily  , Disp: , Rfl:     Current Allergies     Allergies as of 2022 - Reviewed 2022   Allergen Reaction Noted   • Cefuroxime Hives 2013            The following portions of the patient's history were reviewed and updated as appropriate: allergies, current medications, past family history, past medical history, past social history, past surgical history and problem list      Past Medical History:   Diagnosis Date   • Asthma    • Papanicolaou smear 2019    Neg       Past Surgical History:   Procedure Laterality Date   • BREAST SURGERY Left    •  SECTION     • COLONOSCOPY     • DILATION AND CURETTAGE OF UTERUS      X2   • ENDOMETRIAL ABLATION     • MAMMO (HISTORICAL)  2019   • SINUS SURGERY      X2   • TUBAL LIGATION         Family History   Problem Relation Age of Onset   • Brain cancer Father    • Lung cancer Father    • Cancer Father         Pharynx   • Breast cancer Other          Medications have been verified  Objective   /74   Pulse 81   Temp 97 6 °F (36 4 °C) (Temporal)   Resp 18   SpO2 97%   No LMP recorded  Patient is postmenopausal        Physical Exam     Physical Exam  Constitutional:       Appearance: She is not diaphoretic     HENT:      Right Ear: Tympanic membrane normal       Left Ear: Tympanic membrane normal       Ears:      Comments: No blood in the ear canals     Nose: Rhinorrhea present  Comments: Turbinates 1+ in both nostrils     Mouth/Throat:      Mouth: Mucous membranes are moist       Pharynx: No posterior oropharyngeal erythema

## 2022-12-05 ENCOUNTER — EVALUATION (OUTPATIENT)
Dept: PHYSICAL THERAPY | Age: 57
End: 2022-12-05

## 2022-12-05 DIAGNOSIS — M26.621 ARTHRALGIA OF RIGHT TEMPOROMANDIBULAR JOINT: Primary | ICD-10-CM

## 2022-12-05 DIAGNOSIS — G89.29 CHRONIC LEFT-SIDED HEADACHES: ICD-10-CM

## 2022-12-05 DIAGNOSIS — R51.9 CHRONIC LEFT-SIDED HEADACHES: ICD-10-CM

## 2022-12-05 NOTE — PROGRESS NOTES
PT Evaluation     Today's date: 2022  Patient name: Justyna Samayoa  : 1965  MRN: [id number]  Referring provider: Abena Hopkins MD  Dx:   Encounter Diagnosis     ICD-10-CM    1  Arthralgia of right temporomandibular joint  M26 621 Ambulatory Referral to Physical Therapy      2  Chronic left-sided headaches  R51 9     G89 29                      Assessment  Assessment details: Justyna Samayoa is a 65 y o  female who presents with chief complaints of L sided headaches  No further referral is necessary at this time  No red flags noted  Patient has a movement impairment diagnosis of impaired posture representing a pathoantomical diagnosis of temporomandibular dysfunction  Patient is experiencing limitations with jaw opening with clicking and lateral deviation, impaired scapular strength, and poor overall posture with forward head and rounded shoulders  Patient has a positive prognosis  Patient would benefit from PT to address these impairments leading to increased functional capacity and improved quality of life  Patient made aware of condition as well as the proposed treatment plan, including risks, benefits and alternatives  Impairments: abnormal or restricted ROM, impaired physical strength, lacks appropriate home exercise program, pain with function and poor posture   Understanding of Dx/Px/POC: good   Prognosis: good    Goals  Short Term Goals: to be achieved by 4 weeks  1) Patient to be independent with basic HEP  2) Decrease pain to 2/10 at its worst   3) Increase cervical spine ROM by 5-10 degrees in all deficient planes  4) Increase UE strength by 1/2 MMT grade in all deficient planes  5) Improve joint mobility in cervical spine to normal      Long Term Goals: to be achieved by discharge  1) FOTO equal to or greater than 52   2) Patient to be independent with comprehensive HEP  3) Cervical spine ROM WNL all planes to improve a/iadls  4) Patient to have no headaches for >1 week     5) Patient to have normal mouth opening without excursion  Plan  Plan details: Address physical impairments found on IE through therapeutic exercises, postural retraining, neuro re-education, and manual therapy  Patient would benefit from: skilled physical therapy  Planned therapy interventions: manual therapy, massage, neuromuscular re-education, patient education, postural training, strengthening, stretching, therapeutic activities, therapeutic exercise, home exercise program and flexibility  Frequency: 2x week  Duration in weeks: 10  Plan of Care beginning date: 2022  Treatment plan discussed with: patient        Subjective Evaluation    History of Present Illness  Date of onset: 2022  Mechanism of injury: Patient reports symptoms began 3-4 months ago when she noticed bad headaches and feeling like her R ear was clogged and couldn't hear  Locates headaches to left temple and behind eye every day  F/u with medical specialist to r/o dysfunction with eustachian tubes and was told it was her TMJ  Notes "tiredness" in her jaw rather than "pain"  Reports snoring at night and not sure about biting down during night but notices clenching her teeth at work  Currently working in [employer], requires her to be on her feet all day  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  Not a recurrent problem   Quality of life: good    Pain  Current pain ratin  At best pain ratin  At worst pain rating: 10  Location: headaches, L jaw  Quality: pulling, pressure and throbbing  Relieving factors: medications (ibuprofren)  Exacerbated by: sleeping    Progression: no change      Diagnostic Tests  No diagnostic tests performed  Treatments  No previous or current treatments  Patient Goals  Patient goals for therapy: decreased pain and increased strength  Patient goal: normal life without headaches        Objective     Static Posture     Comments  Posture: rounded shoulders and forward head    Palpation: TTP L masseter, L UT at base of neck, and L LS   Increase tension UT b/l     UE dermatome: intact  UE myotome: intact    Reflexes: 2+ b/l    ROM:  Mouth opening: deviation to right side with full opening, clicking but no pain  Protrusion NA  Lateral excursion: WNL b/l  Shoulder ROM WNL all motions, no increase in pain    MMT:  Lower trap L 4-, R 4  Mid trap L 4-, R 4  Latissimus L 4-, R 4-    Special tests:  Spurling (-)  Distraction (-)                 Precautions: n/a      Manuals 12/5            STM masseter, UT             C/s PROM                                       Neuro Re-Ed             scap squeezes HEP            Controlled opening HEP            DNF (w lift)             Low rows             Lat pulldown             B/l ER             Cluck & swallow              Ther Ex             UT stretch HEP            LS stretch HEP            Prone Y's/T's                                                                              Ther Activity                                       Gait Training                                       Modalities

## 2022-12-08 ENCOUNTER — OFFICE VISIT (OUTPATIENT)
Dept: PHYSICAL THERAPY | Age: 57
End: 2022-12-08

## 2022-12-08 DIAGNOSIS — M26.621 ARTHRALGIA OF RIGHT TEMPOROMANDIBULAR JOINT: Primary | ICD-10-CM

## 2022-12-08 DIAGNOSIS — G89.29 CHRONIC LEFT-SIDED HEADACHES: ICD-10-CM

## 2022-12-08 DIAGNOSIS — R51.9 CHRONIC LEFT-SIDED HEADACHES: ICD-10-CM

## 2022-12-08 NOTE — PROGRESS NOTES
Daily Note     Today's date: 2022  Patient name: Justyna Samayoa  : 1965  MRN: [id number]  Referring provider: Abena Hopkins MD  Dx:   Encounter Diagnosis     ICD-10-CM    1  Arthralgia of right temporomandibular joint  M26 621       2  Chronic left-sided headaches  R51 9     G89 29                      Subjective: Patient reports L jaw is a little sore today but not terrible  Having headaches today but didn't have any yesterday even though she quit her job  Trying to be compliant with exercises at home  Objective: See treatment diary below      Assessment: Tolerated treatment well  Targeted postural strengthening exercises with controlled mouth opening to reset TMJ  Patient exhibited good technique with therapeutic exercises and would benefit from continued PT  Plan: Continue per plan of care  Progress treatment as tolerated         Precautions: n/a      Manuals            STM masseter, UT  CS           C/s PROM  CS                                     Neuro Re-Ed             scap squeezes HEP            Controlled opening HEP            DNF (w lift)  2x10 no lift           Low rows  3x10 rtb           Lat pulldown  3x10 rtb           B/l ER             Cluck & swallow              Rocabado 6x6*  x1 mirror           Controlled rotation, 3 fingers  2x10 alt           Ther Ex             UT stretch HEP            LS stretch HEP            Prone Y's/T's                                                                              Ther Activity                                       Gait Training                                       Modalities                                       ** (tongue behind front teeth on roof of mouth) 6 deep breaths, 6 controlled opening, 6 open/close against resistance, 6 open/close palpating joint, 6 chin to chest with hands behind neck, 6 seated DNF, 6 scap squeezes

## 2022-12-13 ENCOUNTER — APPOINTMENT (OUTPATIENT)
Dept: PHYSICAL THERAPY | Age: 57
End: 2022-12-13

## 2022-12-15 ENCOUNTER — APPOINTMENT (OUTPATIENT)
Dept: PHYSICAL THERAPY | Age: 57
End: 2022-12-15

## 2022-12-16 ENCOUNTER — OFFICE VISIT (OUTPATIENT)
Dept: INTERNAL MEDICINE CLINIC | Age: 57
End: 2022-12-16

## 2022-12-16 VITALS
WEIGHT: 150.6 LBS | OXYGEN SATURATION: 97 % | BODY MASS INDEX: 27.71 KG/M2 | HEIGHT: 62 IN | HEART RATE: 80 BPM | SYSTOLIC BLOOD PRESSURE: 140 MMHG | DIASTOLIC BLOOD PRESSURE: 80 MMHG | TEMPERATURE: 97.9 F

## 2022-12-16 DIAGNOSIS — F17.200 TOBACCO USE DISORDER: ICD-10-CM

## 2022-12-16 DIAGNOSIS — R05.1 ACUTE COUGH: ICD-10-CM

## 2022-12-16 DIAGNOSIS — J06.9 UPPER RESPIRATORY TRACT INFECTION, UNSPECIFIED TYPE: Primary | ICD-10-CM

## 2022-12-16 PROBLEM — K59.1 FUNCTIONAL DIARRHEA: Status: RESOLVED | Noted: 2017-12-20 | Resolved: 2022-12-16

## 2022-12-16 PROBLEM — R05.9 COUGH: Status: ACTIVE | Noted: 2022-12-16

## 2022-12-16 PROBLEM — F41.9 ANXIETY AND DEPRESSION: Status: ACTIVE | Noted: 2021-07-08

## 2022-12-16 PROBLEM — I10 ESSENTIAL HYPERTENSION: Status: ACTIVE | Noted: 2021-03-23

## 2022-12-16 PROBLEM — F32.A ANXIETY AND DEPRESSION: Status: ACTIVE | Noted: 2021-07-08

## 2022-12-16 PROBLEM — K58.9 IRRITABLE BOWEL SYNDROME: Status: ACTIVE | Noted: 2017-04-24

## 2022-12-16 PROBLEM — I25.10 CORONARY ARTERY CALCIFICATION SEEN ON CAT SCAN: Status: ACTIVE | Noted: 2021-03-23

## 2022-12-16 PROBLEM — K59.1 FUNCTIONAL DIARRHEA: Status: ACTIVE | Noted: 2017-12-20

## 2022-12-16 RX ORDER — AZITHROMYCIN 250 MG/1
TABLET, FILM COATED ORAL
Qty: 6 TABLET | Refills: 0 | Status: SHIPPED | OUTPATIENT
Start: 2022-12-16 | End: 2022-12-21

## 2022-12-16 NOTE — ASSESSMENT & PLAN NOTE
Patient has been having URI symptoms with a severe cough over the last several weeks she was tested for COVID and even flu elsewhere the cough persists    She is still taking Mucinex and allergy medications so we will try a Z-Jann

## 2022-12-16 NOTE — ASSESSMENT & PLAN NOTE
Again had a discussion on her smoking and she was counseled 3 to 10 minutes on the benefits of a ways to quit smoking

## 2022-12-16 NOTE — PATIENT INSTRUCTIONS
Cold Symptoms   AMBULATORY CARE:   Cold symptoms  include sneezing, dry throat, a stuffy nose, headache, watery eyes, and a cough  Your cough may be dry, or you may cough up mucus  You may also have muscle aches, joint pain, and tiredness  Rarely, you may have a fever  Cold symptoms occur from inflammation in your upper respiratory system caused by a virus  Most colds go away without treatment  Seek care immediately if:   You have increased tiredness and weakness  You are unable to eat  Your heart is beating much faster than usual for you  You see white spots in the back of your throat and your neck is swollen and sore to the touch  You see pinpoint or larger reddish-purple dots on your skin  Contact your healthcare provider if:   You have a fever higher than 102°F (38 9°C)  You have new or worsening shortness of breath  You have thick nasal drainage for more than 2 days  Your symptoms do not improve or get worse within 5 days  You have questions or concerns about your condition or care  Treatment for cold symptoms  may include NSAIDS to decrease muscle aches and fever  Cold medicines may also be given to decrease coughing, nasal stuffiness, sneezing, and a runny nose  Manage your cold symptoms: The following may help relieve cold symptoms, such as a dry throat and congestion:  Gargle with mouthwash or warm salt water as directed  Suck on throat lozenges or hard candy  Use a cold or warm vaporizer or humidifier to ease your breathing  Rest for at least 2 days and then as needed to decrease tiredness and weakness  Use petroleum based jelly around your nostrils to decrease irritation from blowing your nose  Drink plenty of liquids  Liquids will help thin and loosen thick mucus so you can cough it up  Liquids will also keep you hydrated  Ask your healthcare provider which liquids are best for you and how much to drink each day      Prevent the spread of germs by washing your hands often  You can spread your cold germs to others for at least 3 days after your symptoms start  Do not share items, such as eating utensils  Cover your nose and mouth when you cough or sneeze using the crook of your elbow instead of your hands  Throw used tissues in the garbage  Do not smoke:  Smoking may worsen your symptoms and increase the length of time you feel sick  Talk with your healthcare provider if you need help to stop smoking  Follow up with your doctor as directed:  Write down your questions so you remember to ask them during your visits  © Copyright [employer] 2022 Information is for End User's use only and may not be sold, redistributed or otherwise used for commercial purposes  All illustrations and images included in CareNotes® are the copyrighted property of [employer]  or Sidney Jones  The above information is an  only  It is not intended as medical advice for individual conditions or treatments  Talk to your doctor, nurse or pharmacist before following any medical regimen to see if it is safe and effective for you

## 2022-12-16 NOTE — ASSESSMENT & PLAN NOTE
Blood pressure is at the upper end of normal she has been taking a lot of OTC cold preparations we will recheck at her annual

## 2022-12-16 NOTE — PROGRESS NOTES
Name: Maykel Ring      : 1965      MRN: [id number]  Encounter Provider: Candelaria Smith MD  Encounter Date: 2022   Encounter department: [address] [place] [address]     1  Upper respiratory tract infection, unspecified type  -     azithromycin (ZITHROMAX) 250 mg tablet; Take 2 tablets today then 1 tablet daily x 4 days    2  Acute cough  Assessment & Plan:  Patient has been having URI symptoms with a severe cough over the last several weeks she was tested for COVID and even flu elsewhere the cough persists  She is still taking Mucinex and allergy medications so we will try a Z-Jann      3  Tobacco use disorder  Assessment & Plan:  Again had a discussion on her smoking and she was counseled 3 to 10 minutes on the benefits of a ways to quit smoking      BMI Counseling: Body mass index is 27 55 kg/m²  The BMI is above normal  Nutrition recommendations include decreasing portion sizes, decreasing fast food intake, limiting drinks that contain sugar, moderation in carbohydrate intake, increasing intake of lean protein and reducing intake of saturated and trans fat  Exercise recommendations include exercising 3-5 times per week and strength training exercises  No pharmacotherapy was ordered  Rationale for BMI follow-up plan is due to patient being overweight or obese  Subjective      URI   This is a new problem  The current episode started 1 to 4 weeks ago  The problem has been waxing and waning  There has been no fever  Associated symptoms include congestion, coughing, ear pain, a plugged ear sensation and swollen glands  Pertinent negatives include no abdominal pain, chest pain, diarrhea, dysuria, headaches, nausea, neck pain, rash, sore throat or wheezing  She has tried antihistamine, acetaminophen, decongestant, increased fluids and NSAIDs for the symptoms  The treatment provided mild relief       Review of Systems   Constitutional: Negative for chills, fatigue, fever and unexpected weight change  HENT: Positive for congestion and ear pain  Negative for hearing loss, postnasal drip, sinus pressure, sore throat, trouble swallowing and voice change  Eyes: Negative for visual disturbance  Respiratory: Positive for cough  Negative for chest tightness, shortness of breath and wheezing  Cardiovascular: Negative for chest pain, palpitations and leg swelling  Gastrointestinal: Negative for abdominal distention, abdominal pain, anal bleeding, blood in stool, constipation, diarrhea and nausea  Endocrine: Negative for cold intolerance, polydipsia, polyphagia and polyuria  Genitourinary: Negative for dysuria, flank pain, frequency, hematuria and urgency  Musculoskeletal: Negative for arthralgias, back pain, gait problem, joint swelling, myalgias and neck pain  Skin: Negative for rash  Allergic/Immunologic: Negative for immunocompromised state  Neurological: Negative for dizziness, syncope, facial asymmetry, weakness, light-headedness, numbness and headaches  Hematological: Negative for adenopathy  Psychiatric/Behavioral: Negative for confusion, sleep disturbance and suicidal ideas         Current Outpatient Medications on File Prior to Visit   Medication Sig   • amLODIPine (NORVASC) 5 mg tablet Take 5 mg by mouth daily   • aspirin 81 mg chewable tablet Chew 81 mg daily   • atorvastatin (LIPITOR) 20 mg tablet Take 20 mg by mouth daily   • Cholecalciferol 50 MCG (2000 UT) CAPS Take 1 capsule by mouth daily   • fexofenadine (ALLEGRA ALLERGY) 180 MG tablet Take 180 mg by mouth daily    • mometasone (NASONEX) 50 mcg/act nasal spray    • omeprazole (PriLOSEC) 20 mg delayed release capsule Take 1 capsule (20 mg total) by mouth 2 (two) times a day   • traZODone (DESYREL) 50 mg tablet    • venlafaxine (EFFEXOR-XR) 75 mg 24 hr capsule Take 37 5 mg by mouth daily     • [DISCONTINUED] cyclobenzaprine (FLEXERIL) 10 mg tablet Take 1 tablet (10 mg total) by mouth 3 (three) times a day as needed for muscle spasms   • [DISCONTINUED] cyclobenzaprine (FLEXERIL) 5 mg tablet 1-2 PO TID PRN   • [DISCONTINUED] dicyclomine (BENTYL) 20 mg tablet Take 1 tablet (20 mg total) by mouth 2 (two) times a day   • [DISCONTINUED] escitalopram (LEXAPRO) 20 mg tablet Take 20 mg by mouth once   • [DISCONTINUED] hydrOXYzine pamoate (VISTARIL) 50 mg capsule Take 50 mg by mouth       Objective     /80 (BP Location: Left arm, Patient Position: Sitting)   Pulse 80   Temp 97 9 °F (36 6 °C) (Tympanic)   Ht 5' 2" (1 575 m)   Wt 68 3 kg (150 lb 9 6 oz)   SpO2 97%   BMI 27 55 kg/m²     Physical Exam  Elizabeth Little MD

## 2022-12-20 ENCOUNTER — OFFICE VISIT (OUTPATIENT)
Dept: PHYSICAL THERAPY | Age: 57
End: 2022-12-20

## 2022-12-20 DIAGNOSIS — M26.621 ARTHRALGIA OF RIGHT TEMPOROMANDIBULAR JOINT: Primary | ICD-10-CM

## 2022-12-20 DIAGNOSIS — G89.29 CHRONIC LEFT-SIDED HEADACHES: ICD-10-CM

## 2022-12-20 DIAGNOSIS — R51.9 CHRONIC LEFT-SIDED HEADACHES: ICD-10-CM

## 2022-12-20 NOTE — PROGRESS NOTES
Daily Note     Today's date: 2022  Patient name: Reji Oscar  : 1965  MRN: [id number]  Referring provider: Holley Pimentel MD  Dx:   Encounter Diagnosis     ICD-10-CM    1  Arthralgia of right temporomandibular joint  M26 621       2  Chronic left-sided headaches  R51 9     G89 29                      Subjective: Patient reports jaw a little sore since being sick, wasn't able to breathe through her nose which made her jaw sore  Although she does state she felt better after her last session of therapy and thinks it made a difference  Objective: See treatment diary below      Assessment: Tolerated treatment well  Min TTP of L masseter, improved tension noted in L UT  Verbal and tactile cues for proper form with seated DNF  Progression of sets with [place] 6 and jaw setting exercises, completed in front of mirror for visual cues with controlled mouth opening  Patient exhibited good technique with therapeutic exercises and would benefit from continued PT  Plan: Continue per plan of care  Progress treatment as tolerated         Precautions: n/a      Manuals           STM masseter, UT  CS CS          C/s PROM  CS CS                                    Neuro Re-Ed             scap squeezes HEP            Controlled opening HEP            DNF (w lift)  2x10 no lift 2x10x5" no lift          Low rows  3x10 rtb 3x10 rtb          Lat pulldown  3x10 rtb 3x10 rtb          B/l ER             Cluck & swallow    x10          Rocabado 6x6*  x1 mirror 3x at mirror          Controlled rotation, 3 fingers  2x10 alt 2x10 alt          Ther Ex             UT stretch HEP            LS stretch HEP            Prone Y's/T's                                                                              Ther Activity                                       Gait Training                                       Modalities                                       ** (tongue behind front teeth on roof of mouth) 6 deep breaths, 6 controlled opening, 6 open/close against resistance, 6 open/close palpating joint, 6 chin to chest with hands behind neck, 6 seated DNF, 6 scap squeezes

## 2022-12-22 ENCOUNTER — OFFICE VISIT (OUTPATIENT)
Dept: PHYSICAL THERAPY | Age: 57
End: 2022-12-22

## 2022-12-22 DIAGNOSIS — R51.9 CHRONIC LEFT-SIDED HEADACHES: ICD-10-CM

## 2022-12-22 DIAGNOSIS — M26.621 ARTHRALGIA OF RIGHT TEMPOROMANDIBULAR JOINT: Primary | ICD-10-CM

## 2022-12-22 DIAGNOSIS — G89.29 CHRONIC LEFT-SIDED HEADACHES: ICD-10-CM

## 2022-12-22 NOTE — PROGRESS NOTES
Daily Note     Today's date: 2022  Patient name: Hannah Macias  : 1965  MRN: [id number]  Referring provider: Lobo Mackay MD  Dx:   Encounter Diagnosis     ICD-10-CM    1  Arthralgia of right temporomandibular joint  M26 621       2  Chronic left-sided headaches  R51 9     G89 29                      Subjective: Patient reports "having a massive headache the past two days"  Still coughing and thinks the coughing plays a roll in her headache because that's when she notices it really hurts  Also states her jaw is a little sore  Objective: See treatment diary below      Assessment: Tolerated treatment well  Increased tissue tension and TTP in R temporalis and masseter, TTP at suboccipital insertion relieved by STM  Min headache relief with treatment  Patient exhibited good technique with therapeutic exercises and would benefit from continued PT  Educated patient about importance of sleep for tissue healing and encouraged to follow up for sleep study and possible sleep apnea  Plan: Continue per plan of care  Progress treatment as tolerated         Precautions: n/a      Manuals          STM masseter, UT  CS CS CS         C/s PROM  CS CS CS                                   Neuro Re-Ed             scap squeezes HEP            Controlled opening HEP            DNF (w lift)  2x10 no lift 2x10x5" no lift 2x10 no lift         Low rows  3x10 rtb 3x10 rtb 3x10 btb         Lat pulldown  3x10 rtb 3x10 rtb 3x10 btb         B/l ER             Cluck & swallow    x10          Rocabado 6x6*  x1 mirror 3x at mirror 3x at mirror         Controlled rotation, 3 fingers  2x10 alt 2x10 alt 2x10 alt         Ther Ex             UT stretch HEP            LS stretch HEP            Prone Y's/T's                                                                              Ther Activity                                       Gait Training                                       Modalities ** (tongue behind front teeth on roof of mouth) 6 deep breaths, 6 controlled opening, 6 open/close against resistance, 6 open/close palpating joint, 6 chin to chest with hands behind neck, 6 seated DNF, 6 scap squeezes

## 2022-12-27 ENCOUNTER — OFFICE VISIT (OUTPATIENT)
Dept: PHYSICAL THERAPY | Age: 57
End: 2022-12-27

## 2022-12-27 DIAGNOSIS — G47.30 SLEEP APNEA, UNSPECIFIED TYPE: Primary | ICD-10-CM

## 2022-12-27 DIAGNOSIS — R51.9 CHRONIC LEFT-SIDED HEADACHES: ICD-10-CM

## 2022-12-27 DIAGNOSIS — M26.621 ARTHRALGIA OF RIGHT TEMPOROMANDIBULAR JOINT: Primary | ICD-10-CM

## 2022-12-27 DIAGNOSIS — G89.29 CHRONIC LEFT-SIDED HEADACHES: ICD-10-CM

## 2022-12-27 NOTE — PROGRESS NOTES
Daily Note     Today's date: 2022  Patient name: Joann Jenkins  : 1965  MRN: [id number]  Referring provider: Tammie Grimm MD  Dx:   Encounter Diagnosis     ICD-10-CM    1  Arthralgia of right temporomandibular joint  M26 621       2  Chronic left-sided headaches  R51 9     G89 29                      Subjective: Noticing improvements  Had a bad headache when she left last therapy session but hasn't had one that bad since, also noticing headaches less often in general  Reports jaw pain also improving  Still sleeping with mouth open, plans to call PCP today about sleep study  Objective: See treatment diary below      Assessment: Tolerated treatment well  Improvements in tissue tension in masseter and temporalis compared to last session  Progression of periscapular strengthening without exacerbation of symptoms  Patient exhibited good technique with therapeutic exercises and would benefit from continued PT  Plan: Continue per plan of care  Progress treatment as tolerated          Precautions: n/a      Manuals         STM masseter, UT  CS CS CS CS        C/s PROM  CS CS CS                                   Neuro Re-Ed             scap squeezes HEP            Controlled opening HEP            DNF (w lift)  2x10 no lift 2x10x5" no lift 2x10 no lift 2x10 no lift        Low rows  3x10 rtb 3x10 rtb 3x10 btb 3x10 btb        Lat pulldown  3x10 rtb 3x10 rtb 3x10 btb 3x10 btb        B/l ER             Cluck & swallow    x10          Rocabado 6x6*  x1 mirror 3x at mirror 3x at mirror 3x at mirror        Controlled rotation, 3 fingers  2x10 alt 2x10 alt 2x10 alt 2x10 alt        Serratus towel slide, lat lift off     2x10x2"        Standing Y's w chin tuck     2x10x2"        Ther Ex             UT stretch HEP            LS stretch HEP            Prone Y's/T's                                                                              Ther Activity Gait Training                                       Modalities                                       ** (tongue behind front teeth on roof of mouth) 6 deep breaths, 6 controlled opening, 6 open/close against resistance, 6 open/close palpating joint, 6 chin to chest with hands behind neck, 6 seated DNF, 6 scap squeezes

## 2022-12-29 ENCOUNTER — APPOINTMENT (OUTPATIENT)
Dept: PHYSICAL THERAPY | Age: 57
End: 2022-12-29

## 2023-01-03 ENCOUNTER — OFFICE VISIT (OUTPATIENT)
Dept: PHYSICAL THERAPY | Age: 58
End: 2023-01-03

## 2023-01-03 DIAGNOSIS — R51.9 CHRONIC LEFT-SIDED HEADACHES: ICD-10-CM

## 2023-01-03 DIAGNOSIS — M26.621 ARTHRALGIA OF RIGHT TEMPOROMANDIBULAR JOINT: Primary | ICD-10-CM

## 2023-01-03 DIAGNOSIS — G89.29 CHRONIC LEFT-SIDED HEADACHES: ICD-10-CM

## 2023-01-03 NOTE — PROGRESS NOTES
Daily Note     Today's date: 1/3/2023  Patient name: Tigist Mojica  : 1965  MRN: [id number]  Referring provider: Brendan Yeager MD  Dx:   Encounter Diagnosis     ICD-10-CM    1  Arthralgia of right temporomandibular joint  M26 621       2  Chronic left-sided headaches  R51 9     G89 29                      Subjective: Reports still having headaches but not as severe or as often as prior  States that yesterday her jaw felt "tired" rather than painful  Continuing with HEP, going well, but not necessarily finding "relief" if she already has a headache  Objective: See treatment diary below      Assessment: Tolerated treatment well  Tender along bilateral SCM and suboccipitals, relief with STM  Educated patient to try tennis balls in sock for self SOR  Decreased popping noted with controlled mouth opening with increase in ROM  Added isometrics for jaw masticatory muscle control  Patient demonstrated fatigue post treatment, exhibited good technique with therapeutic exercises and would benefit from continued PT  Plan: Continue per plan of care  Progress treatment as tolerated         Precautions: n/a      Manuals 12/5 12/8 12/20 12/22 12/27 1/3/23       STM masseter, UT  CS CS CS CS CS  + SCM       C/s PROM  CS CS CS  CS                                 Neuro Re-Ed             scap squeezes HEP            Controlled opening HEP            DNF (w lift)  2x10 no lift 2x10x5" no lift 2x10 no lift 2x10 no lift 2x10       Low rows  3x10 rtb 3x10 rtb 3x10 btb 3x10 btb 3x10 btb       Lat pulldown  3x10 rtb 3x10 rtb 3x10 btb 3x10 btb 3x10 btb       B/l ER             Cluck & swallow    x10          Rocabado 6x6*  x1 mirror 3x at mirror 3x at mirror 3x at mirror 3x at mirror       Controlled rotation, 3 fingers  2x10 alt 2x10 alt 2x10 alt 2x10 alt        Serratus towel slide, lat lift off     2x10x2" 2x10x2"       Standing Y's w chin tuck     2x10x2"        Jaw isometrics      10x5" ea       Ther Ex UT stretch HEP            LS stretch HEP            Prone Y's/T's                                                                              Ther Activity                                       Gait Training                                       Modalities                                       ** (tongue behind front teeth on roof of mouth) 6 deep breaths, 6 controlled opening, 6 open/close against resistance, 6 open/close palpating joint, 6 chin to chest with hands behind neck, 6 seated DNF, 6 scap squeezes

## 2023-01-05 ENCOUNTER — OFFICE VISIT (OUTPATIENT)
Dept: PHYSICAL THERAPY | Age: 58
End: 2023-01-05

## 2023-01-05 DIAGNOSIS — G89.29 CHRONIC LEFT-SIDED HEADACHES: ICD-10-CM

## 2023-01-05 DIAGNOSIS — R51.9 CHRONIC LEFT-SIDED HEADACHES: ICD-10-CM

## 2023-01-05 DIAGNOSIS — M26.621 ARTHRALGIA OF RIGHT TEMPOROMANDIBULAR JOINT: Primary | ICD-10-CM

## 2023-01-05 NOTE — PROGRESS NOTES
Daily Note     Today's date: 2023  Patient name: Donald Glaser  : 1965  MRN: [id number]  Referring provider: Anh Moon MD  Dx:   Encounter Diagnosis     ICD-10-CM    1  Arthralgia of right temporomandibular joint  M26 621       2  Chronic left-sided headaches  R51 9     G89 29                      Subjective: Patient reports no headaches the past couple of days  Objective: See treatment diary below      Assessment: Tolerated treatment well  Progression of periscapular and jaw strengthening exercises with no exacerbation of symptoms  Patient educated about DOMS with increased resistance during these activities  Patient demonstrated fatigue post treatment, exhibited good technique with therapeutic exercises and would benefit from continued PT  Plan: Continue per plan of care  Progress treatment as tolerated         Precautions: n/a      Manuals 12/5 12/8 12/20 12/22 12/27 1/3/23 1/5      STM masseter, UT  CS CS CS CS CS  + SCM CS      C/s PROM  CS CS CS  CS CS                                Neuro Re-Ed             scap squeezes HEP            Controlled opening HEP            DNF (w lift)  2x10 no lift 2x10x5" no lift 2x10 no lift 2x10 no lift 2x10 2x10x2"      Low rows  3x10 rtb 3x10 rtb 3x10 btb 3x10 btb 3x10 btb 3x10 black      Lat pulldown  3x10 rtb 3x10 rtb 3x10 btb 3x10 btb 3x10 btb 3x10 black      B/l ER             Cluck & swallow    x10          Rocabado 6x6*  x1 mirror 3x at mirror 3x at mirror 3x at mirror 3x at mirror 2x at Sotero Berny      Controlled rotation, 3 fingers  2x10 alt 2x10 alt 2x10 alt 2x10 alt        Serratus towel slide, lat lift off     2x10x2" 2x10x2" 2x10x2"      Standing Y's w chin tuck     2x10x2"        Jaw isometrics      10x5" ea 2x10x5" ea      Cheerleaders       2x10 rtb      Ther Ex             UT stretch HEP            LS stretch HEP            Prone Y's/T's             UBE - scapular mobility       3'/3' Ther Activity                                       Gait Training                                       Modalities                                       ** (tongue behind front teeth on roof of mouth) 6 deep breaths, 6 controlled opening, 6 open/close against resistance, 6 open/close palpating joint, 6 chin to chest with hands behind neck, 6 seated DNF, 6 scap squeezes

## 2023-02-01 ENCOUNTER — OFFICE VISIT (OUTPATIENT)
Dept: SLEEP CENTER | Facility: CLINIC | Age: 58
End: 2023-02-01

## 2023-02-01 VITALS
OXYGEN SATURATION: 98 % | HEART RATE: 81 BPM | BODY MASS INDEX: 28.71 KG/M2 | HEIGHT: 62 IN | WEIGHT: 156 LBS | SYSTOLIC BLOOD PRESSURE: 140 MMHG | DIASTOLIC BLOOD PRESSURE: 80 MMHG

## 2023-02-01 DIAGNOSIS — F41.9 ANXIETY AND DEPRESSION: ICD-10-CM

## 2023-02-01 DIAGNOSIS — G47.33 OBSTRUCTIVE SLEEP APNEA SYNDROME: Primary | ICD-10-CM

## 2023-02-01 DIAGNOSIS — E66.3 OVERWEIGHT (BMI 25.0-29.9): ICD-10-CM

## 2023-02-01 DIAGNOSIS — I10 ESSENTIAL HYPERTENSION: ICD-10-CM

## 2023-02-01 DIAGNOSIS — R51.9 HEADACHE UPON AWAKENING: ICD-10-CM

## 2023-02-01 DIAGNOSIS — F32.A ANXIETY AND DEPRESSION: ICD-10-CM

## 2023-02-01 DIAGNOSIS — K21.9 GASTROESOPHAGEAL REFLUX DISEASE, UNSPECIFIED WHETHER ESOPHAGITIS PRESENT: ICD-10-CM

## 2023-02-01 DIAGNOSIS — F17.200 TOBACCO USE DISORDER: ICD-10-CM

## 2023-02-01 DIAGNOSIS — J45.20 MILD INTERMITTENT ASTHMA WITHOUT COMPLICATION: ICD-10-CM

## 2023-02-01 DIAGNOSIS — G47.9 SLEEP DISTURBANCE: ICD-10-CM

## 2023-02-01 DIAGNOSIS — G47.19 EXCESSIVE DAYTIME SLEEPINESS: ICD-10-CM

## 2023-02-01 NOTE — PATIENT INSTRUCTIONS
What is BREE? Obstructive sleep apnea is a common and serious sleep disorder that causes you to stop breathing during sleep  The airway repeatedly becomes blocked, limiting the amount of air that reaches your lungs  When this happens, you may snore loudly or making choking noises as you try to breathe  Your brain and body becomes oxygen deprived and you may wake up  This may happen a few times a night, or in more severe cases, several hundred times a night  Sleep apnea can make you wake up in the morning feeling tired or unrefreshed even though you have had a full night of sleep  During the day, you may feel fatigued, have difficulty concentrating or you may even unintentionally fall asleep  This is because your body is waking up numerous times throughout the night, even though you might not be conscious of each awakening  The lack of oxygen your body receives can have negative long-term consequences for your health  This includes:  High blood pressure  Heart disease  Irregular heart rhythms  Stroke  Pre-diabetes and diabetes  Depression  Testing  An objective evaluation of your sleep may be needed before your board certified sleep physician can make a diagnosis  Options include:   In-lab overnight sleep study  This type of sleep study requires you to stay overnight at a sleep center, in a bed that may resemble a hotel room  You will sleep with sensors hooked up to various parts of your body  These sensors record your brain waves, heartbeat, breathing and movement  An overnight sleep study also provides your doctor with the most complete information about your sleep  Learn more about an overnight sleep study  Home sleep apnea test  Some patients with high risk factors for obstructive sleep apnea and no other medical disorders may be candidates for a home sleep apnea test  The testing equipment differs in that it is less complicated than what is used in an overnight sleep study   As such, does not give all the data an in-lab will and if negative, may not mean you do not have the problem  Treatment for sleep apnea includes using a continuous positive airway pressure (CPAP) machine to keep your airway open during sleep  A mask is placed over your nose and mouth, or just your nose  The mask is hooked to the CPAP machine that blows a gentle stream of air into the mask when you breathe  This helps keep your airway open so you can breathe more regularly  Extra oxygen may be given to you through the machine  You may be given a mouth device  It looks like a mouth guard or dental retainer and stops your tongue and mouth tissues from blocking your throat while you sleep  Surgery may be needed to remove extra tissues that block your mouth, throat, or nose  Manage sleep apnea:   Do not smoke  Nicotine and other chemicals in cigarettes and cigars can cause lung damage  Ask your healthcare provider for information if you currently smoke and need help to quit  E-cigarettes or smokeless tobacco still contain nicotine  Talk to your healthcare provider before you use these products  Do not drink alcohol or take sedative medicine before you go to sleep  Alcohol and sedatives can relax the muscles and tissues around your throat  This can block the airflow to your lungs  Maintain a healthy weight  Excess tissue around your throat may restrict your breathing  Ask your healthcare provider for information if you need to lose weight  Sleep on your side or use pillows designed to prevent sleep apnea  This prevents your tongue or other tissues from blocking your throat  You can also raise the head of your bed  Driving Safety  Refrain from driving when drowsy  Follow up with your healthcare provider as directed: Write down your questions so you remember to ask them during your visits  Go to AASM website for more information: Sleepeducation  org  What is BREE?    Obstructive sleep apnea is a common and serious sleep disorder that causes you to stop breathing during sleep  The airway repeatedly becomes blocked, limiting the amount of air that reaches your lungs  When this happens, you may snore loudly or making choking noises as you try to breathe  Your brain and body becomes oxygen deprived and you may wake up  This may happen a few times a night, or in more severe cases, several hundred times a night  Sleep apnea can make you wake up in the morning feeling tired or unrefreshed even though you have had a full night of sleep  During the day, you may feel fatigued, have difficulty concentrating or you may even unintentionally fall asleep  This is because your body is waking up numerous times throughout the night, even though you might not be conscious of each awakening  The lack of oxygen your body receives can have negative long-term consequences for your health  This includes:  High blood pressure  Heart disease  Irregular heart rhythms  Stroke  Pre-diabetes and diabetes  Depression  Testing  An objective evaluation of your sleep may be needed before your board certified sleep physician can make a diagnosis  Options include:   In-lab overnight sleep study  This type of sleep study requires you to stay overnight at a sleep center, in a bed that may resemble a hotel room  You will sleep with sensors hooked up to various parts of your body  These sensors record your brain waves, heartbeat, breathing and movement  An overnight sleep study also provides your doctor with the most complete information about your sleep  Learn more about an overnight sleep study  Home sleep apnea test  Some patients with high risk factors for obstructive sleep apnea and no other medical disorders may be candidates for a home sleep apnea test  The testing equipment differs in that it is less complicated than what is used in an overnight sleep study  As such, does not give all the data an in-lab will and if negative, may not mean you do not have the problem    Treatment for sleep apnea includes using a continuous positive airway pressure (CPAP) machine to keep your airway open during sleep  A mask is placed over your nose and mouth, or just your nose  The mask is hooked to the CPAP machine that blows a gentle stream of air into the mask when you breathe  This helps keep your airway open so you can breathe more regularly  Extra oxygen may be given to you through the machine  You may be given a mouth device  It looks like a mouth guard or dental retainer and stops your tongue and mouth tissues from blocking your throat while you sleep  Surgery may be needed to remove extra tissues that block your mouth, throat, or nose  Manage sleep apnea:   Do not smoke  Nicotine and other chemicals in cigarettes and cigars can cause lung damage  Ask your healthcare provider for information if you currently smoke and need help to quit  E-cigarettes or smokeless tobacco still contain nicotine  Talk to your healthcare provider before you use these products  Do not drink alcohol or take sedative medicine before you go to sleep  Alcohol and sedatives can relax the muscles and tissues around your throat  This can block the airflow to your lungs  Maintain a healthy weight  Excess tissue around your throat may restrict your breathing  Ask your healthcare provider for information if you need to lose weight  Sleep on your side or use pillows designed to prevent sleep apnea  This prevents your tongue or other tissues from blocking your throat  You can also raise the head of your bed  Driving Safety  Refrain from driving when drowsy  Follow up with your healthcare provider as directed: Write down your questions so you remember to ask them during your visits  Go to AASM website for more information: Sleepeducation  org    What you can do to improve your sleep: (Sleep Hygiene) Basic rules for a good night's sleep  Create a regular sleep schedule  This will help you form a sleep routine   Keep a record of your sleep patterns, and any sleeping problems you have  Bring the record to follow-up visits with healthcare providers  Avoid prolonged use of light-emitting screens before bedtime or watching TV in bed  Avoid forcing sleep  Do not take naps  Naps could make it hard for you to fall asleep at bedtime  Deal with your worries before bedtime  Keep your bedroom cool, quiet, and dark  Turn on white noise, such as a fan, to help you relax  Do not use your bed for any activity that will keep you awake  Do not read, exercise, eat, or watch TV in your bedroom  Get up if you do not fall asleep within 20 minutes  Move to another room and do something relaxing until you become sleepy  Limit caffeine, alcohol, nicotine and food to earlier in the day  Only drink caffeine in the morning  Do not drink alcohol within 6 hours of bedtime  Do not eat a heavy meal right before you go to bed  Avoid smoking, especially in the evening  Exercise regularly  Daily exercise will help you sleep better  Do not exercise within 4 hours of bedtime  Stimulus control therapy rules  1  Go to bed only when sleepy  2  Do not watch television, read, eat, or worry while in bed  Use bed only for sleep and sex  3  Get out of bed if unable to fall asleep within 20 minutes and go to another room  Return to bed only when sleepy  Repeat this step as many times as necessary throughout the night  4  Set an alarm clock to wake up at a fixed time each morning, including weekends  5  Do not take a nap during the day  Data from: [address] [place] [address], [address] [employer] Nonpharmacologic treatments of insomnia  J Clin Psychiatry 3924; 53:37  Go to AASM website for more information: Sleepeducation  org  Recommended Reading: Book by authors [address]   No More sleepless nights  Nursing Support:  When: Monday through Friday 7A-5PM except holidays  Where: Our direct line is [phone]  If you are having a true emergency please call 911    In the event that the line is busy or it is after hours please leave a voice message and we will return your call  Please speak clearly, leaving your full name, birth date, best number to reach you and the reason for your call  Medication refills: We will need the name of the medication, the dosage, the ordering provider, whether you get a 30 or 90 day refill, and the pharmacy name and address  Medications will be ordered by the provider only  Nurses cannot call in prescriptions  Please allow 7 days for medication refills  Physician requested updates: If your provider requested that you call with an update after starting medication, please be ready to provide us the medication and dosage, what time you take your medication, the time you attempt to fall asleep, time you fall asleep, when you wake up, and what time you get out of bed  Sleep Study Results: We will contact you with sleep study results and/or next steps after the physician has reviewed your testing

## 2023-02-01 NOTE — PROGRESS NOTES
Consultation - Lucian  65 y o  female  :1965  CB  DOS:2023    Physician Requesting Consult: Puja Lomeli MD             Reason for Consult : At your kind request I saw Kirti Agarwal for initial sleep evaluation today  The patient is here to evaluate for suspected Obstructive Sleep Apnea  PFSH, Problem List, Medications & Allergies were reviewed in EMR  Irena Serrato  has a past medical history of Asthma and Papanicolaou smear (2019)  She has a current medication list which includes the following prescription(s): cholecalciferol, fexofenadine, mometasone, trazodone, amlodipine, aspirin, atorvastatin, omeprazole, and venlafaxine  HPI: Family report loud snoring and have witnessed apneas ongoing for at least a decade  She awakens herself with coughing and "cannot catch my breath "  Other Complaints: " Always tired "irrespective of sleep  Restless Leg Syndrome: reports no suggestive symptoms  Parasomnia: no features reported    Sleep Routine (on average): She works second shift  Typical Bedtime: Midnight gets OOB: Noon TIB:12 hrs  Sleep latency: several hours because she is watching TV sleep Interruptions:3-4/nite not always sure of the cause and struggles to fall back asleep  Awakens: needing an alarm  Upon awakening: is not always refreshed and awakens with headache at least 2 times a week  [She estimates getting 5-6 hrs sleep ] Edel reports excessive daytime sleepiness [takes planned naps and dozes off when sedentary at home]  She rated [herself] at Total score: 11 /24 on the [place] Sleepiness Scale  Habits:   reports that she has been smoking cigarettes  She has been smoking an average of  5 packs per day  She has never used smokeless tobacco ;  reports that she does not currently use alcohol ;[ reports no history of drug use  ];[  E-Cigarette/Vaping   • E-Cigarette Use Never User    ]; Caffeine use:excessive; Exercise routine: regular  Family History: Father had obstructive sleep apnea  ROS: Significant for weight has been stable  Sohail Gresham He reported no nasal symptoms  She has had nasal septoplasty in the past   She reports episodic wheezing or shortness of breath  He reported no cardiac symptoms  He has acid reflux  He is on venlafaxine for anxiety and uses trazodone as a sleep aid  Sohail Gresham EXAM:  /80 (BP Location: Left arm, Patient Position: Sitting, Cuff Size: Standard)   Pulse 81   Ht 5' 2" (1 575 m)   Wt 70 8 kg (156 lb)   SpO2 98%   BMI 28 53 kg/m²    General: Well groomed female, well appearing, in no apparent distress  Neurological: Alert and orientated; cooperative; Cranial nerves intact;    Psychiatric: Speech: Clear and coherent; normal mood, affect & thought   Skin: Warm and dry; Color& Hydration good; no facial rashes or lesions   HEENT:  Craniofacial anatomy: normal Sinuses: Non-tender  TMJ: Normal   Eyes: EOM's intact; conjunctiva/corneas clear   Ears: Externally appear normal     Nasal Airway: is patent Septum: Deviated; Mucosa: Normal; Turbinates: Normal; Rhinorrhea: None  Mouth: Lips: Normal posture; Dentition: normal   Mucosa: Moist; Hard Palate:narrow and high arched   Oropharryx: crowded and AP narrowing Tongue: Mallampati:Class IV and MobileSoft Palate:  redundant  Tonsils: absent  Neck:[is thick;] [Neck Circumference: 16 ";] Supple; no abnormal masses; Thyroid: Normal  Trachea: Central     Lymph: No cervical or submandibular Lymhadenopathy  Heart: S1,S2 normal; RRR; no gallop; no murmur s  Lungs: Respiratory Effort: Normal  Air entry good bilaterally  No wheezes  No rales  Abdomen: Obese, soft & non-tender    Extremities: No pedal edema  No clubbing or cyanosis  Musculoskeletal:  Motor normal; Gait: Normal        IMPRESSION: Primary/Secondary Sleep Diagnoses (to Medical or Psych conditions) & Comorbidities   1   Obstructive sleep apnea syndrome  Ambulatory Referral to Sleep Medicine    Diagnostic Sleep Study CPAP Study      2  Sleep disturbance        3  Excessive daytime sleepiness        4  Headache upon awakening        5  Gastroesophageal reflux disease, unspecified whether esophagitis present        6  Essential hypertension        7  Tobacco use disorder        8  Mild intermittent asthma without complication        9  Anxiety and depression        10  Overweight (BMI 25 0-29  9)             PLAN:  1  With respect to above conditions, comprehensive counseling provided on pathophysiology; effects on symptoms and comorbidities, diagnostic strategies & limitations; treatment options; risks or no treament; risks & benefits of the various therapeutic options; costs and insurance aspects  2  I advised weight reduction, avoiding sleeping supine, using alcohol or sedating medications close to bed time and on safe driving practices  3  Nocturnal polysomnography is indicated and a diagnostic study will be scheduled  4   Patient is willing to try Positive airway pressure therapy and will be scheduled for a titration study if indicated  5   Multi component Cognitive behavioral therapy for Insomnia undertaken - Sleep Restriction, Stimulus control, Relaxation techniques and Sleep hygiene were discussed  6   I advised smoking cessation and she is contemplating  7  Follow-up to be scheduled after the studies to review results, further details of treatment options and to initiate/adjust therapy  Sincerely,      Authenticated electronically on 08/89/79   Board Certified Specialist     Portions of the record may have been created with voice recognition software  Occasional wrong word or "sound a like" substitutions may have occurred due to the inherent limitations of voice recognition software  There may also be notations and random deletions of words or characters from malfunctioning software  Read the chart carefully and recognize, using context, where substitutions/deletions have occurred

## 2023-02-03 ENCOUNTER — TELEPHONE (OUTPATIENT)
Dept: ADMINISTRATIVE | Facility: OTHER | Age: 58
End: 2023-02-03

## 2023-02-03 NOTE — TELEPHONE ENCOUNTER
----- Message from Heena Rios MA sent at 2/3/2023 10:37 AM EST -----  Regarding: mammo  Contact: [phone]  02/03/23 10:37 AM    Hello, our patient attached above has had Mammogram completed/performed  Please assist in updating the patient chart by pulling the Care Everywhere (CE) document  The date of service is 03/12/2021       Thank you,  Shay Kurtz  [facility]

## 2023-02-06 NOTE — TELEPHONE ENCOUNTER
Upon review of the In Basket request we were able to locate, review, and update the patient chart as requested for Mammogram     Any additional questions or concerns should be emailed to the Practice Liaisons via the appropriate education email address, please do not reply via In Basket      Thank you  April Noble MA

## 2023-02-09 ENCOUNTER — TELEPHONE (OUTPATIENT)
Dept: FAMILY MEDICINE CLINIC | Facility: CLINIC | Age: 58
End: 2023-02-09

## 2023-02-09 ENCOUNTER — OFFICE VISIT (OUTPATIENT)
Dept: INTERNAL MEDICINE CLINIC | Age: 58
End: 2023-02-09

## 2023-02-09 VITALS
OXYGEN SATURATION: 98 % | TEMPERATURE: 97.8 F | BODY MASS INDEX: 28.63 KG/M2 | WEIGHT: 155.6 LBS | HEIGHT: 62 IN | SYSTOLIC BLOOD PRESSURE: 135 MMHG | DIASTOLIC BLOOD PRESSURE: 75 MMHG | HEART RATE: 78 BPM

## 2023-02-09 DIAGNOSIS — Z00.00 ANNUAL PHYSICAL EXAM: ICD-10-CM

## 2023-02-09 DIAGNOSIS — F17.200 TOBACCO USE DISORDER: Primary | ICD-10-CM

## 2023-02-09 DIAGNOSIS — I10 ESSENTIAL HYPERTENSION: ICD-10-CM

## 2023-02-09 DIAGNOSIS — E78.5 HYPERLIPIDEMIA, UNSPECIFIED HYPERLIPIDEMIA TYPE: ICD-10-CM

## 2023-02-09 DIAGNOSIS — J45.20 MILD INTERMITTENT ASTHMA WITHOUT COMPLICATION: ICD-10-CM

## 2023-02-09 PROBLEM — J06.9 UPPER RESPIRATORY TRACT INFECTION: Status: RESOLVED | Noted: 2022-12-16 | Resolved: 2023-02-09

## 2023-02-09 RX ORDER — ALBUTEROL SULFATE 90 UG/1
2 AEROSOL, METERED RESPIRATORY (INHALATION) EVERY 6 HOURS PRN
Qty: 6.7 G | Refills: 5 | Status: SHIPPED | OUTPATIENT
Start: 2023-02-09

## 2023-02-09 NOTE — ASSESSMENT & PLAN NOTE
Patient does see a cardiologist at [facility] on a yearly basis  However I do not have a copy of an EKG so 1 was done in the office and it is abnormal with possible anterior and inferior infarct    She states that no one has ever mentioned to it so were giving her a copy of her x-ray to take with her to her appointment

## 2023-02-09 NOTE — PATIENT INSTRUCTIONS
Wellness Visit for Adults   AMBULATORY CARE:   A wellness visit  is when you see your healthcare provider to get screened for health problems  Your healthcare provider will also give you advice on how to stay healthy  Write down your questions so you remember to ask them  Ask your healthcare provider how often you should have a wellness visit  What happens at a wellness visit:  Your healthcare provider will ask about your health, and your family history of health problems  This includes high blood pressure, heart disease, and cancer  He or she will ask if you have symptoms that concern you, if you smoke, and about your mood  You may also be asked about your intake of medicines, supplements, food, and alcohol  Any of the following may be done:  · Your weight  will be checked  Your height may also be checked so your body mass index (BMI) can be calculated  Your BMI shows if you are at a healthy weight  · Your blood pressure  and heart rate will be checked  Your temperature may also be checked  · Blood and urine tests  may be done  Blood tests may be done to check your cholesterol levels  Abnormal cholesterol levels increase your risk for heart disease and stroke  You may also need a blood or urine test to check for diabetes if you are at increased risk  Urine tests may be done to look for signs of an infection or kidney disease  · A physical exam  includes checking your heartbeat and lungs with a stethoscope  Your healthcare provider may also check your skin to look for sun damage  · Screening tests  may be recommended  A screening test is done to check for diseases that may not cause symptoms  The screening tests you may need depend on your age, gender, family history, and lifestyle habits  For example, colorectal screening may be recommended if you are 48years old or older  Screening tests you need if you are a woman:   · A Pap smear  is used to screen for cervical cancer   Pap smears are usually done every 3 to 5 years depending on your age  You may need them more often if you have had abnormal Pap smear test results in the past  Ask your healthcare provider how often you should have a Pap smear  · A mammogram  is an x-ray of your breasts to screen for breast cancer  Experts recommend mammograms every 2 years starting at age 48 years  You may need a mammogram at age 52 years or younger if you have an increased risk for breast cancer  Talk to your healthcare provider about when you should start having mammograms and how often you need them  Vaccines you may need:   · Get an influenza vaccine  every year  The influenza vaccine protects you from the flu  Several types of viruses cause the flu  The viruses change over time, so new vaccines are made each year  · Get a tetanus-diphtheria (Td) booster vaccine  every 10 years  This vaccine protects you against tetanus and diphtheria  Tetanus is a severe infection that may cause painful muscle spasms and lockjaw  Diphtheria is a severe bacterial infection that causes a thick covering in the back of your mouth and throat  · Get a human papillomavirus (HPV) vaccine  if you are female and aged 23 to 32 or male 23 to 24 and never received it  This vaccine protects you from HPV infection  HPV is the most common infection spread by sexual contact  HPV may also cause vaginal, penile, and anal cancers  · Get a pneumococcal vaccine  if you are aged 72 years or older  The pneumococcal vaccine is an injection given to protect you from pneumococcal disease  Pneumococcal disease is an infection caused by pneumococcal bacteria  The infection may cause pneumonia, meningitis, or an ear infection  · Get a shingles vaccine  if you are 60 or older, even if you have had shingles before  The shingles vaccine is an injection to protect you from the varicella-zoster virus  This is the same virus that causes chickenpox   Shingles is a painful rash that develops in people who had chickenpox or have been exposed to the virus  How to eat healthy:  My Plate is a model for planning healthy meals  It shows the types and amounts of foods that should go on your plate  Fruits and vegetables make up about half of your plate, and grains and protein make up the other half  A serving of dairy is included on the side of your plate  The amount of calories and serving sizes you need depends on your age, gender, weight, and height  Examples of healthy foods are listed below:  · Eat a variety of vegetables  such as dark green, red, and orange vegetables  You can also include canned vegetables low in sodium (salt) and frozen vegetables without added butter or sauces  · Eat a variety of fresh fruits , canned fruit in 100% juice, frozen fruit, and dried fruit  · Include whole grains  At least half of the grains you eat should be whole grains  Examples include whole-wheat bread, wheat pasta, brown rice, and whole-grain cereals such as oatmeal     · Eat a variety of protein foods such as seafood (fish and shellfish), lean meat, and poultry without skin (turkey and chicken)  Examples of lean meats include pork leg, shoulder, or tenderloin, and beef round, sirloin, tenderloin, and extra lean ground beef  Other protein foods include eggs and egg substitutes, beans, peas, soy products, nuts, and seeds  · Choose low-fat dairy products such as skim or 1% milk or low-fat yogurt, cheese, and cottage cheese  · Limit unhealthy fats  such as butter, hard margarine, and shortening  Exercise:  Exercise at least 30 minutes per day on most days of the week  Some examples of exercise include walking, biking, dancing, and swimming  You can also fit in more physical activity by taking the stairs instead of the elevator or parking farther away from stores  Include muscle strengthening activities 2 days each week  Regular exercise provides many health benefits   It helps you manage your weight, and decreases your risk for type 2 diabetes, heart disease, stroke, and high blood pressure  Exercise can also help improve your mood  Ask your healthcare provider about the best exercise plan for you  General health and safety guidelines:   · Do not smoke  Nicotine and other chemicals in cigarettes and cigars can cause lung damage  Ask your healthcare provider for information if you currently smoke and need help to quit  E-cigarettes or smokeless tobacco still contain nicotine  Talk to your healthcare provider before you use these products  · Limit alcohol  A drink of alcohol is 12 ounces of beer, 5 ounces of wine, or 1½ ounces of liquor  · Lose weight, if needed  Being overweight increases your risk of certain health conditions  These include heart disease, high blood pressure, type 2 diabetes, and certain types of cancer  · Protect your skin  Do not sunbathe or use tanning beds  Use sunscreen with a SPF 15 or higher  Apply sunscreen at least 15 minutes before you go outside  Reapply sunscreen every 2 hours  Wear protective clothing, hats, and sunglasses when you are outside  · Drive safely  Always wear your seatbelt  Make sure everyone in your car wears a seatbelt  A seatbelt can save your life if you are in an accident  Do not use your cell phone when you are driving  This could distract you and cause an accident  Pull over if you need to make a call or send a text message  · Practice safe sex  Use latex condoms if are sexually active and have more than one partner  Your healthcare provider may recommend screening tests for sexually transmitted infections (STIs)  · Wear helmets, lifejackets, and protective gear  Always wear a helmet when you ride a bike or motorcycle, go skiing, or play sports that could cause a head injury  Wear protective equipment when you play sports  Wear a lifejacket when you are on a boat or doing water sports      © Copyright [employer] 2022 Information is for End User's use only and may not be sold, redistributed or otherwise used for commercial purposes  All illustrations and images included in CareNotes® are the copyrighted property of A D A M , Inc  or Sidney Jones  The above information is an  only  It is not intended as medical advice for individual conditions or treatments  Talk to your doctor, nurse or pharmacist before following any medical regimen to see if it is safe and effective for you  Weight Management   AMBULATORY CARE:   Why it is important to manage your weight:  Being overweight increases your risk of health conditions such as heart disease, high blood pressure, type 2 diabetes, and certain types of cancer  It can also increase your risk for osteoarthritis, sleep apnea, and other respiratory problems  Aim for a slow, steady weight loss  Even a small amount of weight loss can lower your risk of health problems  Risks of being overweight:  Extra weight can cause many health problems, including the following:  · Diabetes (high blood sugar level)    · High blood pressure or high cholesterol    · Heart disease    · Stroke    · Gallbladder or liver disease    · Cancer of the colon, breast, prostate, liver, or kidney    · Sleep apnea    · Arthritis or gout    Screening  is done to check for health conditions before you have signs or symptoms  If you are 28to 79years old, your blood sugar level may be checked every 3 years for signs of prediabetes or diabetes  Your healthcare provider will check your blood pressure at each visit  High blood pressure can lead to a stroke or other problems  Your provider may check for signs of heart disease, cancer, or other health problems  How to lose weight safely:  A safe and healthy way to lose weight is to eat fewer calories and get regular exercise  · You can lose up about 1 pound a week by decreasing the number of calories you eat by 500 calories each day   You can decrease calories by eating smaller portion sizes or by cutting out high-calorie foods  Read labels to find out how many calories are in the foods you eat  · You can also burn calories with exercise such as walking, swimming, or biking  You will be more likely to keep weight off if you make these changes part of your lifestyle  Exercise at least 30 minutes per day on most days of the week  You can also fit in more physical activity by taking the stairs instead of the elevator or parking farther away from stores  Ask your healthcare provider about the best exercise plan for you  Healthy meal plan for weight management:  A healthy meal plan includes a variety of foods, contains fewer calories, and helps you stay healthy  A healthy meal plan includes the following:     · Eat whole-grain foods more often  A healthy meal plan should contain fiber  Fiber is the part of grains, fruits, and vegetables that is not broken down by your body  Whole-grain foods are healthy and provide extra fiber in your diet  Some examples of whole-grain foods are whole-wheat breads and pastas, oatmeal, brown rice, and bulgur  · Eat a variety of vegetables every day  Include dark, leafy greens such as spinach, kale, bogdan greens, and mustard greens  Eat yellow and orange vegetables such as carrots, sweet potatoes, and winter squash  · Eat a variety of fruits every day  Choose fresh or canned fruit (canned in its own juice or light syrup) instead of juice  Fruit juice has very little or no fiber  · Eat low-fat dairy foods  Drink fat-free (skim) milk or 1% milk  Eat fat-free yogurt and low-fat cottage cheese  Try low-fat cheeses such as mozzarella and other reduced-fat cheeses  · Choose meat and other protein foods that are low in fat  Choose beans or other legumes such as split peas or lentils  Choose fish, skinless poultry (chicken or turkey), or lean cuts of red meat (beef or pork)   Before you cook meat or poultry, cut off any visible fat  · Use less fat and oil  Try baking foods instead of frying them  Add less fat, such as margarine, sour cream, regular salad dressing and mayonnaise to foods  Eat fewer high-fat foods  Some examples of high-fat foods include french fries, doughnuts, ice cream, and cakes  · Eat fewer sweets  Limit foods and drinks that are high in sugar  This includes candy, cookies, regular soda, and sweetened drinks  Ways to decrease calories:   · Eat smaller portions  ? Use a small plate with smaller servings  ? Do not eat second helpings  ? When you eat at a restaurant, ask for a box and place half of your meal in the box before you eat  ? Share an entrée with someone else  · Replace high-calorie snacks with healthy, low-calorie snacks  ? Choose fresh fruit, vegetables, fat-free rice cakes, or air-popped popcorn instead of potato chips, nuts, or chocolate  ? Choose water or calorie-free drinks instead of soda or sweetened drinks  · Do not shop for groceries when you are hungry  You may be more likely to make unhealthy food choices  Take a grocery list of healthy foods and shop after you have eaten  · Eat regular meals  Do not skip meals  Skipping meals can lead to overeating later in the day  This can make it harder for you to lose weight  Eat a healthy snack in place of a meal if you do not have time to eat a regular meal  Talk with a dietitian to help you create a meal plan and schedule that is right for you  Other things to consider as you try to lose weight:   · Be aware of situations that may give you the urge to overeat, such as eating while watching television  Find ways to avoid these situations  For example, read a book, go for a walk, or do crafts  · Meet with a weight loss support group or friends who are also trying to lose weight  This may help you stay motivated to continue working on your weight loss goals      © Copyright [employer] 2022 Information is for End User's use only and may not be sold, redistributed or otherwise used for commercial purposes  All illustrations and images included in CareNotes® are the copyrighted property of A D A M , Inc  or Sidney Jones  The above information is an  only  It is not intended as medical advice for individual conditions or treatments  Talk to your doctor, nurse or pharmacist before following any medical regimen to see if it is safe and effective for you  Cigarette Smoking and Your Health   AMBULATORY CARE:   Risks to your health if you smoke:  Nicotine and other chemicals found in tobacco and e-cigarettes can damage every cell in your body  Even if you are a light smoker, you have an increased risk for cancer, heart disease, and lung disease  If you are pregnant or have diabetes, smoking increases your risk for complications  Nicotine can affect an adolescent's developing brain  This can lead to trouble thinking, learning, or paying attention  Benefits to your health if you stop smoking:   · You decrease respiratory symptoms such as coughing, wheezing, and shortness of breath  · You reduce your risk for cancers of the lung, mouth, throat, kidney, bladder, pancreas, stomach, and cervix  If you already have cancer, you increase the benefits of chemotherapy  You also reduce your risk for cancer returning or a second cancer from developing  · You reduce your risk for heart disease, blood clots, heart attack, and stroke  · You reduce your risk for lung infections, and diseases such as pneumonia, asthma, chronic bronchitis, and emphysema  · Your circulation improves  More oxygen can be delivered to your body  If you have diabetes, you lower your risk for complications, such as kidney, artery, and eye diseases  You also lower your risk for nerve damage  Nerve damage can lead to amputations, poor vision, and blindness      · You improve your body's ability to heal and to fight infections  · An adolescent can help his or her brain and body develop in a healthy way  Talk to your adolescent about all the health risks of nicotine  If you can, start talking about nicotine when your child is younger than 12 years  This may make it easier for him or her not to start using nicotine as a teenager or adult  Explain to him or her that it is best never to start  It can be hard to try to quit later  Benefits to the health of others if you stop smoking:  Tobacco is harmful to nonsmokers who breathe in your secondhand smoke  The following are ways the health of others around you may improve when you stop smoking:  · You lower the risks for lung cancer and heart disease in nonsmoking adults  · If you are pregnant, you lower the risk for miscarriage, early delivery, low birth weight, and stillbirth  You also lower your baby's risk for SIDS, obesity, developmental delay, and neurobehavioral problems, such as ADHD  · If you have children, you lower their risk for ear infections, colds, pneumonia, bronchitis, and asthma  Follow up with your doctor as directed:  Write down your questions so you remember to ask them during your visits  For support and more information:   · American [employer] Association  [address] [facility],[address]  [address]  Phone: [place] [address]  Phone: 0- 038 - 219-4212  Web Address: [employer]    · Smokefree  gov  Phone: 2- 618 - 001-6613  Web Address: www smokefree  [facility] 21 2022 Information is for End User's use only and may not be sold, redistributed or otherwise used for commercial purposes  All illustrations and images included in CareNotes® are the copyrighted property of [employer]  or [address] [place], [place] [address]sedrick oTledo   The above information is an  only  It is not intended as medical advice for individual conditions or treatments   Talk to your doctor, nurse or pharmacist before following any medical regimen to see if it is safe and effective for you

## 2023-02-09 NOTE — PROGRESS NOTES
[facility]  [facility] [place]    NAME: Bethany Johnson  AGE: 63 y o  SEX: female  : 1965     DATE: 2023     Assessment and Plan:     Problem List Items Addressed This Visit        Respiratory    Asthma     Patient has mild asthma and usually has an inhaler at home for rare use         Relevant Medications    albuterol (Proventil HFA) 90 mcg/act inhaler       Cardiovascular and Mediastinum    Essential hypertension     Personally will probably add low-dose ACE she is due to see cardiology soon         Relevant Orders    Comprehensive metabolic panel    POCT ECG       Other    Hyperlipidemia     Continues with atorvastatin is due for blood work         Relevant Orders    Lipid panel    TSH, 3rd generation with Free T4 reflex    Tobacco use disorder - Primary     She continues to smoke a few cigarettes a day she was again counseled 3 to 10 minutes on the benefits of and ways to quit smoking        Other Visit Diagnoses     Annual physical exam              Immunizations and preventive care screenings were discussed with patient today  Appropriate education was printed on patient's after visit summary  Counseling:  · Injury prevention: discussed safety/seat belts, safety helmets, smoke detectors, carbon dioxide detectors, and smoking near bedding or upholstery  smoking,dep    BMI Counseling: Body mass index is 28 46 kg/m²  The BMI is above normal  Nutrition recommendations include decreasing portion sizes, encouraging healthy choices of fruits and vegetables, limiting drinks that contain sugar and moderation in carbohydrate intake  Exercise recommendations include exercising 3-5 times per week  Rationale for BMI follow-up plan is due to patient being overweight or obese  Tobacco Cessation Counseling: Tobacco cessation counseling was provided   The patient is sincerely urged to quit consumption of tobacco  She is ready to quit tobacco  Medication options and side effects of medication discussed  Patient refused medication  No follow-ups on file  Chief Complaint:     Chief Complaint   Patient presents with   • Annual Exam      History of Present Illness:     Adult Annual Physical   Patient here for a comprehensive physical exam  The patient reports problems - asthma,dep  Diet and Physical Activity  · Diet/Nutrition: well balanced diet  · Exercise: walking  Depression Screening  PHQ-2/9 Depression Screening         General Health  · Sleep: gets 4-6 hours of sleep on average  · Hearing: normal - bilateral   · Vision: goes for regular eye exams  · Dental: no dental visits for >1 year  /GYN Health  · Patient is: postmenopausal  · Last menstrual period:   · Contraceptive method: none needed  Review of Systems:     Review of Systems   Constitutional: Negative for chills and fever  HENT: Negative for ear pain and sore throat  Eyes: Negative for pain and visual disturbance  Respiratory: Positive for cough and shortness of breath  Cardiovascular: Negative for chest pain and palpitations  Gastrointestinal: Negative for abdominal pain and vomiting  Genitourinary: Negative for dysuria and hematuria  Musculoskeletal: Negative for arthralgias and back pain  Skin: Negative for color change and rash  Neurological: Negative for seizures and syncope  Psychiatric/Behavioral: Positive for dysphoric mood and sleep disturbance  All other systems reviewed and are negative       Past Medical History:     Past Medical History:   Diagnosis Date   • Asthma    • Papanicolaou smear 2019    Neg      Past Surgical History:     Past Surgical History:   Procedure Laterality Date   • BREAST SURGERY Left    •  SECTION     • COLONOSCOPY     • DILATION AND CURETTAGE OF UTERUS      X2   • ENDOMETRIAL ABLATION     • MAMMO (HISTORICAL)  2019   • SINUS SURGERY      X2   • TUBAL LIGATION        Social History:     Social History     Socioeconomic History   • Marital status:      Spouse name: None   • Number of children: None   • Years of education: None   • Highest education level: None   Occupational History   • None   Tobacco Use   • Smoking status: Every Day     Packs/day: 0 50     Types: Cigarettes   • Smokeless tobacco: Never   Vaping Use   • Vaping Use: Never used   Substance and Sexual Activity   • Alcohol use: Not Currently   • Drug use: Never   • Sexual activity: Yes     Partners: Male     Birth control/protection: Female Sterilization   Other Topics Concern   • None   Social History Narrative    Guns presents at home      Pets:  Cats     Social Determinants of Health     Financial Resource Strain: Not on file   Food Insecurity: Not on file   Transportation Needs: Not on file   Physical Activity: Not on file   Stress: Not on file   Social Connections: Not on file   Intimate Partner Violence: Not on file   Housing Stability: Not on file      Family History:     Family History   Problem Relation Age of Onset   • Brain cancer Father    • Lung cancer Father    • Cancer Father         Pharynx   • Breast cancer Other       Current Medications:     Current Outpatient Medications   Medication Sig Dispense Refill   • albuterol (Proventil HFA) 90 mcg/act inhaler Inhale 2 puffs every 6 (six) hours as needed for wheezing 6 7 g 5   • amLODIPine (NORVASC) 5 mg tablet Take 5 mg by mouth daily     • aspirin 81 mg chewable tablet Chew 81 mg daily     • atorvastatin (LIPITOR) 20 mg tablet Take 20 mg by mouth daily     • Cholecalciferol 50 MCG (2000 UT) CAPS Take 1 capsule by mouth daily     • fexofenadine (ALLEGRA) 180 MG tablet Take 180 mg by mouth daily      • mometasone (NASONEX) 50 mcg/act nasal spray      • omeprazole (PriLOSEC) 20 mg delayed release capsule Take 1 capsule (20 mg total) by mouth 2 (two) times a day 60 capsule 0   • traZODone (DESYREL) 50 mg tablet      • venlafaxine (EFFEXOR-XR) 75 mg 24 hr capsule Take 37 5 mg by mouth daily         No current facility-administered medications for this visit  Allergies: Allergies   Allergen Reactions   • Cefuroxime Hives      Physical Exam:     /75   Pulse 78   Temp 97 8 °F (36 6 °C)   Ht 5' 2" (1 575 m)   Wt 70 6 kg (155 lb 9 6 oz)   SpO2 98%   BMI 28 46 kg/m²     Physical Exam  Vitals and nursing note reviewed  Constitutional:       General: She is not in acute distress  Appearance: She is well-developed  HENT:      Head: Normocephalic and atraumatic  Nose: Nose normal       Mouth/Throat:      Mouth: Mucous membranes are moist    Eyes:      Conjunctiva/sclera: Conjunctivae normal    Neck:      Vascular: No carotid bruit  Cardiovascular:      Rate and Rhythm: Normal rate and regular rhythm  Pulses: Normal pulses  Heart sounds: No murmur heard  Pulmonary:      Effort: Pulmonary effort is normal  No respiratory distress  Breath sounds: Normal breath sounds  No wheezing  Abdominal:      General: Abdomen is flat  Bowel sounds are normal       Palpations: Abdomen is soft  Tenderness: There is no abdominal tenderness  Musculoskeletal:         General: No swelling  Cervical back: Neck supple  Right lower leg: No edema  Left lower leg: No edema  Skin:     General: Skin is warm and dry  Capillary Refill: Capillary refill takes less than 2 seconds  Neurological:      General: No focal deficit present  Mental Status: She is alert and oriented to person, place, and time  Cranial Nerves: No cranial nerve deficit  Coordination: Coordination normal       Gait: Gait normal    Psychiatric:         Mood and Affect: Mood normal          Behavior: Behavior normal          Thought Content:  Thought content normal          Judgment: Judgment normal           Deann Alfaro MD  [facility] INTERNAL MEDICINE [place]

## 2023-02-14 PROBLEM — R05.9 COUGH: Status: RESOLVED | Noted: 2022-12-16 | Resolved: 2023-02-14

## 2023-04-03 ENCOUNTER — HOSPITAL ENCOUNTER (OUTPATIENT)
Dept: SLEEP CENTER | Facility: CLINIC | Age: 58
Discharge: HOME | End: 2023-04-03

## 2023-04-03 DIAGNOSIS — G47.33 OBSTRUCTIVE SLEEP APNEA SYNDROME: ICD-10-CM

## 2023-04-04 NOTE — PROGRESS NOTES
Sleep Study Documentation    Pre-Sleep Study       Sleep testing procedure explained to patient:YES    Patient napped prior to study:NO    Caffeine:Dayshift worker after 12PM   Caffeine use:NO    Alcohol:Dayshift workers after 5PM: Alcohol use:NO    Typical day for patient:YES       Study Documentation    Sleep Study Indications: Snoring, BMI>30, EDS, Unrefreshing sleep, Witnessed apneas    Sleep Study: Diagnostic   Snore:Severe  Supplemental O2: no    O2 flow rate (L/min) range   O2 flow rate (L/min) final   Minimum SaO2 82%  Baseline SaO2 94%        Mode of Therapy:    EKG abnormalities: no     EEG abnormalities: no    Sleep Study Recorded < 2 hours: N/A    Sleep Study Recorded > 2 hours but incomplete study: N/A    Sleep Study Recorded 6 hours but no sleep obtained: NO    Patient classification: employed       Post-Sleep Study    Medication used at bedtime or during sleep study:YES other prescription medications    Patient reports time it took to fall asleep:less than 20 minutes    Patient reports waking up during study:1 to 2 times  Patient reports returning to sleep in 10 to 30 minutes  Patient reports sleeping 6 to 8 hours with dreaming  Patient reports sleep during study:better than usual    Patient rated sleepiness: Somewhat sleepy or tired    PAP treatment:no

## 2023-04-19 DIAGNOSIS — G47.33 OBSTRUCTIVE SLEEP APNEA SYNDROME: Primary | ICD-10-CM

## 2023-05-01 ENCOUNTER — TELEPHONE (OUTPATIENT)
Dept: SLEEP CENTER | Facility: CLINIC | Age: 58
End: 2023-05-01

## 2023-05-01 NOTE — TELEPHONE ENCOUNTER
Patient left message asking what the next steps would be after her sleep study  Patient can be reached at [phone]

## 2023-05-03 LAB

## 2023-05-03 NOTE — TELEPHONE ENCOUNTER
Returned call from patient  Advised CPAP study is resulted and APAP ordered  DME setup 5/17/2023 in [place]  Compliance follow-up 10/25/2023 with Dr Abida Canales in the [place] office  Patient added to the cancellation list for a sooner appointment within the 31-90 day compliance window (6/17/23-8/15/23)  Rx for CPAP and clinicals sent to [facility] via [place]

## 2023-05-17 LAB

## 2023-05-18 ENCOUNTER — TELEPHONE (OUTPATIENT)
Dept: SLEEP CENTER | Facility: CLINIC | Age: 58
End: 2023-05-18

## 2023-07-06 ENCOUNTER — ANNUAL EXAM (OUTPATIENT)
Dept: OBGYN CLINIC | Facility: CLINIC | Age: 58
End: 2023-07-06
Payer: COMMERCIAL

## 2023-07-06 VITALS
BODY MASS INDEX: 29.74 KG/M2 | DIASTOLIC BLOOD PRESSURE: 80 MMHG | SYSTOLIC BLOOD PRESSURE: 132 MMHG | WEIGHT: 161.6 LBS | HEIGHT: 62 IN

## 2023-07-06 DIAGNOSIS — Z12.31 ENCOUNTER FOR SCREENING MAMMOGRAM FOR MALIGNANT NEOPLASM OF BREAST: ICD-10-CM

## 2023-07-06 DIAGNOSIS — R10.9 ABDOMINAL CRAMPING: ICD-10-CM

## 2023-07-06 DIAGNOSIS — Z01.419 PAP SMEAR, AS PART OF ROUTINE GYNECOLOGICAL EXAMINATION: ICD-10-CM

## 2023-07-06 DIAGNOSIS — Z01.419 ENCOUNTER FOR GYNECOLOGICAL EXAMINATION WITHOUT ABNORMAL FINDING: Primary | ICD-10-CM

## 2023-07-06 DIAGNOSIS — N95.2 ATROPHIC VAGINITIS: ICD-10-CM

## 2023-07-06 PROCEDURE — 99386 PREV VISIT NEW AGE 40-64: CPT | Performed by: OBSTETRICS & GYNECOLOGY

## 2023-07-06 PROCEDURE — G0145 SCR C/V CYTO,THINLAYER,RESCR: HCPCS | Performed by: OBSTETRICS & GYNECOLOGY

## 2023-07-06 RX ORDER — AMLODIPINE BESYLATE 2.5 MG/1
TABLET ORAL
COMMUNITY
Start: 2023-05-05

## 2023-07-06 RX ORDER — FENOFIBRATE 67 MG/1
CAPSULE ORAL
COMMUNITY
Start: 2023-06-26

## 2023-07-06 RX ORDER — ATORVASTATIN CALCIUM 40 MG/1
TABLET, FILM COATED ORAL
COMMUNITY
Start: 2023-05-05

## 2023-07-06 NOTE — PROGRESS NOTES
Assessment/Plan:    No problem-specific Assessment & Plan notes found for this encounter. Diagnoses and all orders for this visit:    Encounter for gynecological examination without abnormal finding  -     Liquid-based pap, screening    Pap smear, as part of routine gynecological examination    Encounter for screening mammogram for malignant neoplasm of breast  -     Mammo screening bilateral w 3d & cad; Future    Atrophic vaginitis    Abdominal cramping    Other orders  -     amLODIPine (NORVASC) 2.5 mg tablet  -     atorvastatin (LIPITOR) 40 mg tablet  -     fenofibrate micronized (LOFIBRA) 67 MG capsule          Normal gynecological physical examination. Self-breast examination stressed. Mammogram ordered. Discussed regular exercise, healthy diet, importance of vitamin D and calcium supplements. Discussed importance of sun block use during periods of prolonged sun exposure. Patient will be seen in 1 year for routine gynecologic and medical examination. Patient will call office for any problems, concerns, or issues which may arise during the interim. Pelvic ultrasound ordered at this time as well    Subjective:   MH is a 63 year  post-menopausal female with PMH of BREE, asthma, GERD, IBS, HTN, HLD, coronary artery calcification, hiatal hernia presenting to the office as a new patient for an annual wellness visit and to discuss abdominal cramping. Patient reports lower abdominal cramping and pelvic pain x 3 months that comes and goes. She also reports having cramping after sexual activity with one male partner. Patient states pain and cramping are somewhat relieved by ibuprofen. Given that the pain and cramping is located near her  scar, she would like to ensure the pain is not gynecologic in nature. She has been menopausal for 5+ years. She denies any changes to her routine including activity, diet, and medication changes.  She follows with primary care for other chronic conditions      Patient ID: Lloyd Nielson is a 63 y.o. female who presents today for her annual gynecologic and medical examination    Menstrual status: Postmenopausal    Vasomotor symptoms: Patient denies any symptoms    Patient reports normal appetite    Patient reports normal bowel and bladder habits    Patient denies any significant pelvic or abdominal pain    Patient denies any headaches, chest pain, shortness of breath fever shakes or chills    Patient denies any COVID 19 symptoms including cough or loss of taste or smell    COVID vaccine status: Vaccinated    Medical problems: BREE, asthma, GERD, IBS, HTN, HLD, coronary artery calcification, tobacco use, hiatal hernia    Colonoscopy status: Patient states she had one in the last 5 years. Mammogram status: She denies any changes to her breasts including pain, lumps, bumps, nipple discharge. History of dense breasts, last mammogram done 5/3/2023      The following portions of the patient's history were reviewed and updated as appropriate: allergies, current medications, past family history, past medical history, past social history, past surgical history and problem list.    Review of Systems   Constitutional: Negative. Negative for appetite change, diaphoresis, fatigue, fever and unexpected weight change. HENT: Negative. Eyes: Negative. Respiratory: Negative. Cardiovascular: Negative. Gastrointestinal: Negative. Negative for abdominal pain, blood in stool, constipation, diarrhea, nausea and vomiting. Endocrine: Negative. Negative for cold intolerance and heat intolerance. Genitourinary: Positive for pelvic pain. Negative for dysuria, frequency, hematuria, urgency, vaginal bleeding, vaginal discharge and vaginal pain. Musculoskeletal: Negative. Skin: Negative. Allergic/Immunologic: Negative. Neurological: Negative. Hematological: Negative. Negative for adenopathy. Psychiatric/Behavioral: Negative. Objective:      /80   Ht 5' 2" (1.575 m)   Wt 73.3 kg (161 lb 9.6 oz)   BMI 29.56 kg/m²          Physical Exam  Constitutional:       General: She is not in acute distress. Appearance: Normal appearance. She is well-developed. She is not diaphoretic. HENT:      Head: Normocephalic and atraumatic. Eyes:      Pupils: Pupils are equal, round, and reactive to light. Cardiovascular:      Rate and Rhythm: Normal rate and regular rhythm. Heart sounds: Normal heart sounds. No murmur heard. No friction rub. No gallop. Pulmonary:      Effort: Pulmonary effort is normal.      Breath sounds: Normal breath sounds. Chest:   Breasts:     Breasts are symmetrical.      Right: No inverted nipple, mass, nipple discharge, skin change or tenderness. Left: No inverted nipple, mass, nipple discharge, skin change or tenderness. Abdominal:      General: Bowel sounds are normal.      Palpations: Abdomen is soft. Tenderness: There is abdominal tenderness in the right lower quadrant, periumbilical area, suprapubic area and left lower quadrant. Hernia: No hernia is present. Genitourinary:     Exam position: Supine. Labia:         Right: No rash or lesion. Left: No rash or lesion. Urethra: No urethral swelling or urethral lesion. Vagina: Normal. No vaginal discharge, erythema, tenderness or bleeding. Cervix: No discharge or friability. Uterus: Not enlarged and not tender. Adnexa:         Right: No mass, tenderness or fullness. Left: No mass, tenderness or fullness. Rectum: Normal. Guaiac result negative. Comments: Pelvic exam revealed mild atrophic vaginitis  Good pelvic support confirmed  Musculoskeletal:         General: Normal range of motion. Cervical back: Normal range of motion and neck supple. Lymphadenopathy:      Cervical: No cervical adenopathy. Upper Body:      Right upper body: No supraclavicular adenopathy. Left upper body: No supraclavicular adenopathy. Skin:     General: Skin is warm and dry. Findings: No rash. Neurological:      Mental Status: She is alert and oriented to person, place, and time. Psychiatric:         Speech: Speech normal.         Behavior: Behavior normal.         Thought Content:  Thought content normal.         Judgment: Judgment normal.

## 2023-07-06 NOTE — PATIENT INSTRUCTIONS
Patient should schedule appointment for pelvic ultrasound. Patient also recommended to follow up with primary care or GI to work-up abdominal sources for pain    Normal gynecological physical examination. Self-breast examination stressed. Mammogram ordered. Discussed regular exercise, healthy diet, importance of vitamin D and calcium supplements. Discussed importance of sun block use during periods of prolonged sun exposure. Patient will be seen in 1 year for routine gynecologic and medical examination. Patient will call office for any problems, concerns, or issues which may arise during the interim.

## 2023-07-10 ENCOUNTER — ULTRASOUND (OUTPATIENT)
Dept: OBGYN CLINIC | Facility: CLINIC | Age: 58
End: 2023-07-10
Payer: COMMERCIAL

## 2023-07-10 DIAGNOSIS — R10.2 PELVIC PAIN: Primary | ICD-10-CM

## 2023-07-10 DIAGNOSIS — R10.2 PELVIC CRAMPING: ICD-10-CM

## 2023-07-10 PROCEDURE — 76856 US EXAM PELVIC COMPLETE: CPT | Performed by: OBSTETRICS & GYNECOLOGY

## 2023-07-10 NOTE — PROGRESS NOTES
AMB US Pelvic Non OB    Date/Time: 7/10/2023 10:30 AM    Performed by: Laron Hwang  Authorized by: Amie Pelaez MD    Procedure details:     Indications: non-obstetric abdominal pain      Technique:  US Pelvic, Non-OB with complete exam  Uterine findings:     Length (cm): 5.4    Height (cm):  3.9    Width (cm):  3.7    Uterine adhesions: not identified      Adnexal mass: not identified      Polyps: not identified      Myomas: not identified      Endometrial stripe: identified      Endometrial hyperplasia: not identified      Endometrium thickness (mm):  4  Left ovary findings:     Left ovary:  Visualized    Cysts: not identified      Length (cm): 2.8    Height (cm): 2.3    Width (cm): 1.7  Right ovary findings:     Right ovary:  Visualized    Cysts: not identified      Length (cm): 2.8    Height (cm): 2.7    Width (cm): 2.3  Other findings:     Free pelvic fluid: not identified      Free peritoneal fluid: not identified    Post-Procedure Details:     Impression:  Endo-4mm,WNL    Tolerance: Tolerated well, no immediate complications  Additional Procedure Comments:          Dr. Jason Aguilar.  MD  [address] [place] [address] Rd  [address] [place] [address] IRENE Taylor, [address] [facility]  [phone]

## 2023-07-11 NOTE — PROGRESS NOTES
Pelvic ultrasound results reviewed and no remarkable or significant findings were seen    Results discussed in detail with the patient    We will see her back for routine follow-up as planned    Patient will call for any problems, questions, issues or concerns which arise for her

## 2023-07-12 LAB
LAB AP GYN PRIMARY INTERPRETATION: NORMAL
Lab: NORMAL

## 2023-08-24 ENCOUNTER — OFFICE VISIT (OUTPATIENT)
Dept: FAMILY MEDICINE CLINIC | Facility: CLINIC | Age: 58
End: 2023-08-24
Payer: COMMERCIAL

## 2023-08-24 VITALS
WEIGHT: 164.2 LBS | DIASTOLIC BLOOD PRESSURE: 76 MMHG | BODY MASS INDEX: 30.22 KG/M2 | TEMPERATURE: 97.6 F | SYSTOLIC BLOOD PRESSURE: 130 MMHG | HEIGHT: 62 IN | OXYGEN SATURATION: 98 % | HEART RATE: 84 BPM

## 2023-08-24 DIAGNOSIS — J06.9 UPPER RESPIRATORY TRACT INFECTION, UNSPECIFIED TYPE: Primary | ICD-10-CM

## 2023-08-24 DIAGNOSIS — F17.200 TOBACCO USE DISORDER: ICD-10-CM

## 2023-08-24 PROBLEM — G47.33 SEVERE OBSTRUCTIVE SLEEP APNEA: Status: ACTIVE | Noted: 2023-05-05

## 2023-08-24 PROBLEM — E66.3 OVERWEIGHT (BMI 25.0-29.9): Status: ACTIVE | Noted: 2023-05-05

## 2023-08-24 PROBLEM — I65.23 ASYMPTOMATIC CAROTID ARTERY STENOSIS, BILATERAL: Status: ACTIVE | Noted: 2023-05-05

## 2023-08-24 PROCEDURE — 99213 OFFICE O/P EST LOW 20 MIN: CPT | Performed by: INTERNAL MEDICINE

## 2023-08-24 RX ORDER — OXYBUTYNIN CHLORIDE 5 MG/1
5 TABLET ORAL 2 TIMES DAILY PRN
COMMUNITY
Start: 2023-07-17 | End: 2024-07-16

## 2023-08-24 RX ORDER — AZITHROMYCIN 250 MG/1
TABLET, FILM COATED ORAL
Qty: 6 TABLET | Refills: 0 | Status: SHIPPED | OUTPATIENT
Start: 2023-08-24 | End: 2023-08-29

## 2023-08-24 NOTE — PROGRESS NOTES
Name: Renato Dick      : 1965      MRN: [id number]  Encounter Provider: Deon Campbell MD  Encounter Date: 2023   Encounter department: [facility]     1. Upper respiratory tract infection, unspecified type  -     azithromycin (Zithromax) 250 mg tablet; Take 2 tablets (500 mg total) by mouth daily for 1 day, THEN 1 tablet (250 mg total) daily for 4 days. 2. Tobacco use disorder           Subjective      Sore Throat   This is a new problem. The current episode started in the past 7 days. The problem has been gradually worsening. The maximum temperature recorded prior to her arrival was 100.4 - 100.9 F. The fever has been present for less than 1 day. The pain is at a severity of 5/10. The pain is moderate. Associated symptoms include congestion, ear pain, a hoarse voice, shortness of breath (Chronic) and trouble swallowing. Pertinent negatives include no abdominal pain, coughing, diarrhea, headaches, neck pain or vomiting. She has had no exposure to strep or mono. She has tried cool liquids, NSAIDs and acetaminophen for the symptoms. The treatment provided mild relief. Earache   Associated symptoms include a sore throat. Pertinent negatives include no abdominal pain, coughing, diarrhea, headaches, hearing loss, neck pain, rash or vomiting. Review of Systems   Constitutional: Positive for chills and fatigue. Negative for fever and unexpected weight change. HENT: Positive for congestion, ear pain, hoarse voice, sore throat and trouble swallowing. Negative for hearing loss, postnasal drip, sinus pressure and voice change. Eyes: Negative for pain and visual disturbance. Respiratory: Positive for shortness of breath (Chronic). Negative for cough, chest tightness and wheezing. Cardiovascular: Negative for chest pain, palpitations and leg swelling.    Gastrointestinal: Negative for abdominal distention, abdominal pain, anal bleeding, blood in stool, constipation, diarrhea, nausea and vomiting. Endocrine: Negative for cold intolerance, polydipsia, polyphagia and polyuria. Genitourinary: Negative for dysuria, flank pain, frequency, hematuria and urgency. Musculoskeletal: Positive for myalgias. Negative for arthralgias, back pain, gait problem, joint swelling and neck pain. Skin: Negative for color change and rash. Allergic/Immunologic: Negative for immunocompromised state. Neurological: Negative for dizziness, seizures, syncope, facial asymmetry, weakness, light-headedness, numbness and headaches. Hematological: Negative for adenopathy. Psychiatric/Behavioral: Negative for confusion, sleep disturbance and suicidal ideas. All other systems reviewed and are negative.       Current Outpatient Medications on File Prior to Visit   Medication Sig   • albuterol (Proventil HFA) 90 mcg/act inhaler Inhale 2 puffs every 6 (six) hours as needed for wheezing   • amLODIPine (NORVASC) 2.5 mg tablet    • atorvastatin (LIPITOR) 40 mg tablet    • Cholecalciferol 50 MCG (2000 UT) CAPS Take 1 capsule by mouth daily   • fenofibrate micronized (LOFIBRA) 67 MG capsule    • fexofenadine (ALLEGRA) 180 MG tablet Take 180 mg by mouth daily    • mometasone (NASONEX) 50 mcg/act nasal spray    • omeprazole (PriLOSEC) 20 mg delayed release capsule Take 1 capsule (20 mg total) by mouth 2 (two) times a day   • oxybutynin (DITROPAN) 5 mg tablet Take 5 mg by mouth 2 (two) times a day as needed   • venlafaxine (EFFEXOR-XR) 75 mg 24 hr capsule Take 37.5 mg by mouth daily     • amLODIPine (NORVASC) 5 mg tablet Take 5 mg by mouth daily   • aspirin 81 mg chewable tablet Chew 81 mg daily   • [DISCONTINUED] atorvastatin (LIPITOR) 20 mg tablet Take 20 mg by mouth daily   • [DISCONTINUED] traZODone (DESYREL) 50 mg tablet  (Patient not taking: Reported on 8/24/2023)       Objective     /76 (BP Location: Right arm, Patient Position: Sitting, Cuff Size: Large)   Pulse 84   Temp 97.6 °F (36.4 °C) (Temporal)   Ht 5' 2" (1.575 m)   Wt 74.5 kg (164 lb 3.2 oz)   SpO2 98%   BMI 30.03 kg/m²     Physical Exam  Vitals reviewed. Constitutional:       Appearance: She is obese. She is ill-appearing. HENT:      Right Ear: Tympanic membrane is erythematous. Left Ear: Tympanic membrane is erythematous. Ears:      Comments: TM membranes red and retracted bilaterally     Nose: Congestion present. Mouth/Throat:      Pharynx: Posterior oropharyngeal erythema present. No pharyngeal swelling or oropharyngeal exudate. Tonsils: No tonsillar exudate or tonsillar abscesses. Eyes:      Conjunctiva/sclera: Conjunctivae normal.      Pupils: Pupils are equal, round, and reactive to light. Cardiovascular:      Rate and Rhythm: Normal rate and regular rhythm. Heart sounds: Normal heart sounds. Pulmonary:      Breath sounds: No wheezing or rhonchi. Abdominal:      Palpations: Abdomen is soft. Musculoskeletal:      Cervical back: Normal range of motion and neck supple. Lymphadenopathy:      Cervical: Cervical adenopathy present. Skin:     General: Skin is warm. Capillary Refill: Capillary refill takes less than 2 seconds. Findings: No erythema or rash. Neurological:      General: No focal deficit present. Mental Status: She is alert and oriented to person, place, and time.    Psychiatric:         Mood and Affect: Mood normal.         Behavior: Behavior normal.       Enzo Lee MD

## 2023-09-15 ENCOUNTER — OFFICE VISIT (OUTPATIENT)
Dept: SLEEP CENTER | Facility: CLINIC | Age: 58
End: 2023-09-15
Payer: COMMERCIAL

## 2023-09-15 VITALS
RESPIRATION RATE: 18 BRPM | DIASTOLIC BLOOD PRESSURE: 78 MMHG | SYSTOLIC BLOOD PRESSURE: 120 MMHG | BODY MASS INDEX: 30.91 KG/M2 | HEART RATE: 80 BPM | HEIGHT: 62 IN | WEIGHT: 168 LBS | OXYGEN SATURATION: 98 %

## 2023-09-15 DIAGNOSIS — G47.9 SLEEP DISTURBANCE: ICD-10-CM

## 2023-09-15 DIAGNOSIS — G47.61 PERIODIC LIMB MOVEMENTS OF SLEEP: ICD-10-CM

## 2023-09-15 DIAGNOSIS — E66.9 OBESITY (BMI 30-39.9): ICD-10-CM

## 2023-09-15 DIAGNOSIS — G47.33 OBSTRUCTIVE SLEEP APNEA SYNDROME: Primary | ICD-10-CM

## 2023-09-15 DIAGNOSIS — K21.9 GASTROESOPHAGEAL REFLUX DISEASE, UNSPECIFIED WHETHER ESOPHAGITIS PRESENT: ICD-10-CM

## 2023-09-15 DIAGNOSIS — J45.20 MILD INTERMITTENT ASTHMA WITHOUT COMPLICATION: ICD-10-CM

## 2023-09-15 DIAGNOSIS — F17.200 TOBACCO USE DISORDER: ICD-10-CM

## 2023-09-15 DIAGNOSIS — F32.A ANXIETY AND DEPRESSION: ICD-10-CM

## 2023-09-15 DIAGNOSIS — R51.9 HEADACHE UPON AWAKENING: ICD-10-CM

## 2023-09-15 DIAGNOSIS — I10 ESSENTIAL HYPERTENSION: ICD-10-CM

## 2023-09-15 DIAGNOSIS — F41.9 ANXIETY AND DEPRESSION: ICD-10-CM

## 2023-09-15 DIAGNOSIS — G47.19 EXCESSIVE DAYTIME SLEEPINESS: ICD-10-CM

## 2023-09-15 DIAGNOSIS — R10.9 ABDOMINAL PAIN: ICD-10-CM

## 2023-09-15 PROCEDURE — 99214 OFFICE O/P EST MOD 30 MIN: CPT | Performed by: INTERNAL MEDICINE

## 2023-09-15 RX ORDER — OMEPRAZOLE 20 MG/1
20 CAPSULE, DELAYED RELEASE ORAL 2 TIMES DAILY
Qty: 60 CAPSULE | Refills: 0 | Status: SHIPPED | OUTPATIENT
Start: 2023-09-15 | End: 2023-10-15

## 2023-09-15 NOTE — TELEPHONE ENCOUNTER
Reason for call:   [x] Refill   [] Prior Auth  [] Other:     Office:   [x] PCP/Provider - Dr Moyer[] Speciality/Provider -     Medication: Omeprazole  Trazodone    Dose/Frequency: 20mg twice daily, 50mg     Quantity: 60, 30    Pharmacy: [facility]    Does the patient have enough for 3 days? [] Yes   [x] No - Send as HP to POD    Is the patient having symptoms?    [x] No   [] Yes -     Patient stated that Sleep  told her to restart Trazodone

## 2023-09-15 NOTE — PATIENT INSTRUCTIONS
What you can do to improve your sleep: (Sleep Hygiene) Basic rules for a good night's sleep  Create a regular sleep schedule. This will help you form a sleep routine. Keep a record of your sleep patterns, and any sleeping problems you have. Bring the record to follow-up visits with healthcare providers. Avoid prolonged use of light-emitting screens before bedtime or watching TV in bed. Avoid forcing sleep. Do not take naps. Naps could make it hard for you to fall asleep at bedtime. Deal with your worries before bedtime. Keep your bedroom cool, quiet, and dark. Turn on white noise, such as a fan, to help you relax. Do not use your bed for any activity that will keep you awake. Do not read, exercise, eat, or watch TV in your bedroom. Get up if you do not fall asleep within 20 minutes. Move to another room and do something relaxing until you become sleepy. Limit caffeine, alcohol, nicotine and food to earlier in the day. Only drink caffeine in the morning. Do not drink alcohol within 6 hours of bedtime. Do not eat a heavy meal right before you go to bed. Avoid smoking, especially in the evening. Exercise regularly. Daily exercise will help you sleep better. Do not exercise within 4 hours of bedtime. Stimulus control therapy rules  1. Go to bed only when sleepy. 2. Do not watch television, read, eat, or worry while in bed. Use bed only for sleep and sex. 3. Get out of bed if unable to fall asleep within 20 minutes and go to another room. Return to bed only when sleepy. Repeat this step as many times as necessary throughout the night. 4. Set an alarm clock to wake up at a fixed time each morning, including weekends. 5. Do not take a nap during the day. Data from: [address], 1958 [facility] Nonpharmacologic treatments of insomnia. J Clin Psychiatry 1330; 53:37. Go to [employer]ASM website for more information: Sleepeducation. org  Recommended Reading: Book by obi Dunn   No More sleepless nights    Nursing Support:  When: Monday through Friday 7A-5PM except holidays  Where: Our direct line is [phone]. If you are having a true emergency please call 911. In the event that the line is busy or it is after hours please leave a voice message and we will return your call. Please speak clearly, leaving your full name, birth date, best number to reach you and the reason for your call. Medication refills: We will need the name of the medication, the dosage, the ordering provider, whether you get a 30 or 90 day refill, and the pharmacy name and address. Medications will be ordered by the provider only. Nurses cannot call in prescriptions. Please allow 7 days for medication refills. Physician requested updates: If your provider requested that you call with an update after starting medication, please be ready to provide us the medication and dosage, what time you take your medication, the time you attempt to fall asleep, time you fall asleep, when you wake up, and what time you get out of bed. Sleep Study Results: We will contact you with sleep study results and/or next steps after the physician has reviewed your testing.

## 2023-09-15 NOTE — PROGRESS NOTES
Follow-Up Note - [facility]   Ronita Baumgarten  63 y.o. female  :1965  ICN:[id number]  DOS:9/15/2023    CC: I saw this patient for follow-up in clinic today for Sleep disordered breathing, Coexisting Sleep and Medical Problems. Interval changes: Treatment was initiated using a ResMed machine. The patient had both diagnostic and therapeutic sleep studies and is here to review results and to adjust therapy. The diagnostic study confirmed severe obstructive sleep apnea:  (AHI) of 51.1 events per hour of sleep. The AHI in the supine position was 70.1. The AHI during REM sleep was 0.8. Moderate to loud intensity snoring was noted. The lowest oxygen saturation was 82.0%. The amount of sleep time below 90% was 106.7 minutes. During the subsequent therapeutic study, sleep disordered breathing was adequately remediated with positive airway pressure at 14 cm H2O. they will mild periodic limb movements of sleep and sleep maintenance insomnia. Sleep efficiency was reduced at 81.2%. PFSH, Problem List, Medications & Allergies were reviewed in EMR. She  has a past medical history of Asthma and Papanicolaou smear (2019). She has a current medication list which includes the following prescription(s): albuterol, amlodipine, atorvastatin, cholecalciferol, fenofibrate micronized, fexofenadine, mometasone, oxybutynin, amlodipine, aspirin, omeprazole, and venlafaxine. PHYSIOLOGICAL DATA REVIEW : Device has been used 16/30 days and average on days used is 4 hours. At times she falls asleep before applying CPAP.   using PAP > 4 hours/night 27%. Estimated ART 3/hour with pressure of 10.9cm [email or web address] percentile; patient has not been using non FDA approved devices to sanitize the machine. INTERPRETATION: Compliance is poor; Pressure setting is:within target range; ;   SUBJECTIVE: With respect to use of PAP, Edel  is experiencing no adverse effects:. She derives benefit.   Is satisfied with sleep and daytime function. Sleep Routine: Edel reports getting 5-7 hrs sleep; she has difficulty initiating sleep, but not maintaining sleep . She arises needing an alarm and feels more refreshed since on Rx. Edel reports significantly improved excessive daytime sleepiness,. She rated [herself] at Total score: 4 /24 on the [place] Sleepiness Scale. Other issues: Headaches have resolved. Periodic limb movements are not disturbing sleep. Habits:[ reports that she has been smoking cigarettes. She has been smoking an average of .5 packs per day. She has never used smokeless tobacco.], [ reports that she does not currently use alcohol.], [ reports no history of drug use.], Caffeine use:excessive; Exercise routine: none. ROS: Significant for few pounds weight gain. Nasal respiratory and acid reflux are controlled. She is on Effexor for anxiety and depression. She has ongoing psychosocial stresses and feelings of anxiety    EXAM: /78   Pulse 80   Resp 18   Ht 5' 2" (1.575 m)   Wt 76.2 kg (168 lb)   SpO2 98%   BMI 30.73 kg/m²     Wt Readings from Last 3 Encounters:   09/15/23 76.2 kg (168 lb)   08/24/23 74.5 kg (164 lb 3.2 oz)   07/06/23 73.3 kg (161 lb 9.6 oz)      Patient is well groomed; well appearing. CNS: Alert, orientated, speech clear & coherent  Psych: cooperative and in no distress. Mental state: Appears anxious. H&N: EOMI; NC/AT: No facial pressure marks, no rashes. Skin/Extrem: col & hydration normal; no edema  Resp: Respiratory effort is normal  Physical findings otherwise essentially unchanged from previous. IMPRESSION: Problem List Items & Comorbidities Addressed this Visit    1. Obstructive sleep apnea syndrome  PAP DME Resupply/Reorder    PAP DME Pressure Change      2. Sleep disturbance        3. Excessive daytime sleepiness        4. Headache upon awakening        5. Anxiety and depression        6.  Gastroesophageal reflux disease, unspecified whether esophagitis present        7. Essential hypertension        8. Tobacco use disorder        9. Mild intermittent asthma without complication        10. Obesity (BMI 30-39.9)        1-4 improved    PLAN:  1. I reviewed results of prior studies and physiologic data with the patient. 2. I discussed treatment options with risks and benefits. 3. Treatment with  PAP is medically necessary and Edel is agreable to continue use. 4. Care of equipment, methods to improve comfort using PAP and importance of compliance with therapy were discussed. 5. Pressure setting: adjust 9-13 cmH2O.    6. Rx provided to replace supplies and Care coordinated with DME provider. 7. Multi component Cognitive behavioral therapy for Insomnia undertaken - Sleep Restriction, Stimulus control, Relaxation techniques and Sleep hygiene were discussed. Specifically avoiding caffeine use after 3 PM, smoking close to bedtime and preferably smoking cessation. Also advised limiting time in bed to 7 hours. 8. It appears she will benefit from dose adjustment of Effexor. 9. Discussed strategies for weight reduction. 10. Follow-up is advised in 1 year or sooner if needed to monitor progress, compliance and to adjust therapy. Thank you for allowing me to participate in the care of this patient. Sincerely,     Authenticated electronically on 62/44/53   Board Certified Specialist     Portions of the record may have been created with voice recognition software. Occasional wrong word or "sound a like" substitutions may have occurred due to the inherent limitations of voice recognition software. There may also be notations and random deletions of words or characters from malfunctioning software. Read the chart carefully and recognize, using context, where substitutions/deletions have occurred.

## 2023-09-18 ENCOUNTER — TELEPHONE (OUTPATIENT)
Dept: SLEEP CENTER | Facility: CLINIC | Age: 58
End: 2023-09-18

## 2023-09-20 LAB

## 2023-10-05 DIAGNOSIS — F32.A ANXIETY AND DEPRESSION: Primary | ICD-10-CM

## 2023-10-05 DIAGNOSIS — F41.9 ANXIETY AND DEPRESSION: Primary | ICD-10-CM

## 2023-10-05 NOTE — TELEPHONE ENCOUNTER
Pt requested to refill effexor but script is . Pt only has 1 pill left and stated if not taken she can be ill. Please be advised. Reason for call:   [x] Refill   [] Prior Auth  [] Other:     Office:   [x] PCP/Provider -   [] Speciality/Provider -     Medication: effexor     Dose/Frequency: 37.5 mg/ daily     Quantity: 30 day supply     Pharmacy: [facility]     Does the patient have enough for 3 days?    [] Yes   [x] No - Send as HP to POD

## 2023-10-06 RX ORDER — VENLAFAXINE HYDROCHLORIDE 37.5 MG/1
37.5 CAPSULE, EXTENDED RELEASE ORAL DAILY
Qty: 30 CAPSULE | Refills: 0 | Status: SHIPPED | OUTPATIENT
Start: 2023-10-06 | End: 2026-03-20

## 2023-11-10 DIAGNOSIS — F32.A ANXIETY AND DEPRESSION: ICD-10-CM

## 2023-11-10 DIAGNOSIS — F41.9 ANXIETY AND DEPRESSION: ICD-10-CM

## 2023-11-10 RX ORDER — VENLAFAXINE HYDROCHLORIDE 37.5 MG/1
37.5 CAPSULE, EXTENDED RELEASE ORAL DAILY
Qty: 30 CAPSULE | Refills: 0 | Status: SHIPPED | OUTPATIENT
Start: 2023-11-10 | End: 2026-04-24

## 2023-11-10 NOTE — TELEPHONE ENCOUNTER
Reason for call:   [x] Refill   [] Prior Auth  [] Other:     Office:   [x] PCP/Provider - Dr Zeny Fowler  [] Specialty/Provider -     Medication: venlafazine 37.5 mg , 1qd, 30      Pharmacy: [facility]   Does the patient have enough for 3 days?    [] Yes   [x] No - Send as HP to POD-out of medication

## 2023-11-28 ENCOUNTER — OFFICE VISIT (OUTPATIENT)
Dept: FAMILY MEDICINE CLINIC | Facility: CLINIC | Age: 58
End: 2023-11-28
Payer: COMMERCIAL

## 2023-11-28 VITALS
RESPIRATION RATE: 16 BRPM | SYSTOLIC BLOOD PRESSURE: 138 MMHG | HEART RATE: 81 BPM | DIASTOLIC BLOOD PRESSURE: 78 MMHG | OXYGEN SATURATION: 97 % | WEIGHT: 172 LBS | HEIGHT: 62 IN | TEMPERATURE: 97.7 F | BODY MASS INDEX: 31.65 KG/M2

## 2023-11-28 DIAGNOSIS — K21.9 GASTROESOPHAGEAL REFLUX DISEASE, UNSPECIFIED WHETHER ESOPHAGITIS PRESENT: Primary | ICD-10-CM

## 2023-11-28 DIAGNOSIS — Z12.11 SCREEN FOR COLON CANCER: ICD-10-CM

## 2023-11-28 DIAGNOSIS — I10 ESSENTIAL HYPERTENSION: ICD-10-CM

## 2023-11-28 DIAGNOSIS — R10.9 ABDOMINAL PAIN: ICD-10-CM

## 2023-11-28 DIAGNOSIS — F41.9 ANXIETY AND DEPRESSION: ICD-10-CM

## 2023-11-28 DIAGNOSIS — Z23 ENCOUNTER FOR IMMUNIZATION: ICD-10-CM

## 2023-11-28 DIAGNOSIS — F17.200 TOBACCO USE DISORDER: ICD-10-CM

## 2023-11-28 DIAGNOSIS — F32.A ANXIETY AND DEPRESSION: ICD-10-CM

## 2023-11-28 DIAGNOSIS — G47.33 SEVERE OBSTRUCTIVE SLEEP APNEA: ICD-10-CM

## 2023-11-28 PROCEDURE — 90677 PCV20 VACCINE IM: CPT

## 2023-11-28 PROCEDURE — 90472 IMMUNIZATION ADMIN EACH ADD: CPT

## 2023-11-28 PROCEDURE — 90471 IMMUNIZATION ADMIN: CPT

## 2023-11-28 PROCEDURE — 99214 OFFICE O/P EST MOD 30 MIN: CPT | Performed by: INTERNAL MEDICINE

## 2023-11-28 PROCEDURE — 90686 IIV4 VACC NO PRSV 0.5 ML IM: CPT

## 2023-11-28 RX ORDER — VENLAFAXINE HYDROCHLORIDE 37.5 MG/1
75 CAPSULE, EXTENDED RELEASE ORAL DAILY
Qty: 30 CAPSULE | Refills: 3 | Status: SHIPPED | OUTPATIENT
Start: 2023-11-28 | End: 2026-05-12

## 2023-11-28 RX ORDER — OMEPRAZOLE 20 MG/1
20 CAPSULE, DELAYED RELEASE ORAL 2 TIMES DAILY
Qty: 60 CAPSULE | Refills: 0 | Status: SHIPPED | OUTPATIENT
Start: 2023-11-28 | End: 2023-12-28

## 2023-11-28 RX ORDER — MONTELUKAST SODIUM 10 MG/1
10 TABLET ORAL
COMMUNITY

## 2023-11-28 NOTE — PROGRESS NOTES
Name: Jose Fowler      : 1965      MRN: [id number]  Encounter Provider: Socorro Owens MD  Encounter Date: 2023   Encounter department: [facility]     1. Gastroesophageal reflux disease, unspecified whether esophagitis present  Assessment & Plan:  Results control to reflux. Orders:  -     Ambulatory Referral to Gastroenterology; Future  -     omeprazole (PriLOSEC) 20 mg delayed release capsule; Take 1 capsule (20 mg total) by mouth 2 (two) times a day    2. Screen for colon cancer  -     Ambulatory Referral to Gastroenterology; Future    3. Tobacco use disorder  Assessment & Plan:  She again was counseled through to Ms. Cherie Cedillo several ways to quit smoking      4. Anxiety and depression  Assessment & Plan:  She continues on Effexor    Orders:  -     venlafaxine (EFFEXOR-XR) 37.5 mg 24 hr capsule; Take 2 capsules (75 mg total) by mouth daily    5. Essential hypertension  Assessment & Plan:  Blood pressure currently is controlled with Norvasc    Orders:  -     amLODIPine (NORVASC) 5 mg tablet; Take 1 tablet (5 mg total) by mouth daily    6. Severe obstructive sleep apnea  Assessment & Plan:  She has been referred to sleep specialists. 7. Abdominal pain    8. Encounter for immunization  -     influenza vaccine, quadrivalent, 0.5 mL, preservative-free, for adult and pediatric patients 6 mos+ (AFLURIA, FLUARIX, FLULAVAL, FLUZONE)  -     Pneumococcal Conjugate Vaccine 20-valent (Pcv20)           Subjective      Patient speak is probably severe obstructive sleep apnea  and Theodoro Pulse which are both aggravated by smoking and continueing to gain weight      Review of Systems   Constitutional:  Positive for fatigue. Negative for chills and fever. HENT:  Negative for ear pain and sore throat. Eyes:  Negative for pain and visual disturbance. Respiratory:  Positive for chest tightness. Negative for cough and shortness of breath.     Cardiovascular:  Negative for chest pain and palpitations. Gastrointestinal:  Negative for abdominal pain and vomiting. Gerd   Genitourinary:  Negative for dysuria and hematuria. Musculoskeletal:  Negative for arthralgias and back pain. Skin:  Negative for color change and rash. Neurological:  Negative for seizures and syncope. Psychiatric/Behavioral:  Positive for dysphoric mood. All other systems reviewed and are negative. Current Outpatient Medications on File Prior to Visit   Medication Sig    albuterol (PROVENTIL HFA,VENTOLIN HFA) 90 mcg/act inhaler INHALE 2 PUFFS EVERY 6 (SIX) HOURS AS NEEDED FOR WHEEZING    aspirin 81 mg chewable tablet Chew 81 mg daily    atorvastatin (LIPITOR) 40 mg tablet     Cholecalciferol 50 MCG (2000 UT) CAPS Take 1 capsule by mouth daily    fenofibrate micronized (LOFIBRA) 67 MG capsule     fexofenadine (ALLEGRA) 180 MG tablet Take 180 mg by mouth daily     montelukast (SINGULAIR) 10 mg tablet Take 10 mg by mouth daily at bedtime    oxybutynin (DITROPAN) 5 mg tablet Take 5 mg by mouth 2 (two) times a day as needed    [DISCONTINUED] amLODIPine (NORVASC) 2.5 mg tablet     [DISCONTINUED] amLODIPine (NORVASC) 5 mg tablet Take 5 mg by mouth daily       Objective     /78 (BP Location: Right arm, Patient Position: Sitting, Cuff Size: Large)   Pulse 81   Temp 97.7 °F (36.5 °C) (Temporal)   Resp 16   Ht 5' 2" (1.575 m)   Wt 78 kg (172 lb)   SpO2 97%   BMI 31.46 kg/m²     Physical Exam  Constitutional:       General: She is not in acute distress. Appearance: She is well-developed. She is obese. HENT:      Right Ear: Tympanic membrane and external ear normal.      Left Ear: Tympanic membrane and external ear normal.      Nose: Nose normal.      Mouth/Throat:      Mouth: Mucous membranes are moist.      Pharynx: No oropharyngeal exudate. Eyes:      Extraocular Movements: Extraocular movements intact. Pupils: Pupils are equal, round, and reactive to light.    Neck:      Thyroid: No thyromegaly. Vascular: No JVD. Cardiovascular:      Rate and Rhythm: Normal rate and regular rhythm. Heart sounds: Normal heart sounds. No murmur heard. No gallop. Pulmonary:      Effort: Pulmonary effort is normal.      Breath sounds: Normal breath sounds. No wheezing, rhonchi or rales. Abdominal:      General: Bowel sounds are normal. There is no distension. Palpations: Abdomen is soft. There is no mass. Tenderness: There is no abdominal tenderness. Musculoskeletal:         General: No tenderness. Normal range of motion. Cervical back: Normal range of motion and neck supple. Right lower leg: No edema. Left lower leg: No edema. Lymphadenopathy:      Cervical: No cervical adenopathy. Skin:     Findings: No rash. Neurological:      General: No focal deficit present. Mental Status: She is alert and oriented to person, place, and time. Cranial Nerves: No cranial nerve deficit. Coordination: Coordination normal.   Psychiatric:         Mood and Affect: Mood normal.         Behavior: Behavior normal.         Thought Content:  Thought content normal.         Judgment: Judgment normal.       Clarita Garcia MD

## 2023-11-29 RX ORDER — AMLODIPINE BESYLATE 5 MG/1
5 TABLET ORAL DAILY
Qty: 30 TABLET | Refills: 32 | Status: SHIPPED | OUTPATIENT
Start: 2023-11-29 | End: 2026-08-05

## 2024-01-26 ENCOUNTER — TELEPHONE (OUTPATIENT)
Age: 59
End: 2024-01-26

## 2024-01-26 DIAGNOSIS — F41.9 ANXIETY AND DEPRESSION: ICD-10-CM

## 2024-01-26 DIAGNOSIS — F32.A ANXIETY AND DEPRESSION: ICD-10-CM

## 2024-01-26 RX ORDER — VENLAFAXINE HYDROCHLORIDE 75 MG/1
75 CAPSULE, EXTENDED RELEASE ORAL DAILY
Qty: 30 CAPSULE | Refills: 3 | Status: SHIPPED | OUTPATIENT
Start: 2024-01-26 | End: 2024-01-29

## 2024-01-26 NOTE — TELEPHONE ENCOUNTER
Pts insurance will not fill her effexor at the new dosage. The pharmacist suggested to the patient asking the doctor to write the script for 1 pill at the higher dosage, instead of 2 pills.     Patient would like a [employer] message if script is sent through with the one pill instead of two.  Patient adds she only has 4 days left of medication.     Please advise, thank you

## 2024-01-29 DIAGNOSIS — F41.9 ANXIETY AND DEPRESSION: ICD-10-CM

## 2024-01-29 DIAGNOSIS — F32.A ANXIETY AND DEPRESSION: ICD-10-CM

## 2024-01-29 RX ORDER — VENLAFAXINE HYDROCHLORIDE 150 MG/1
150 CAPSULE, EXTENDED RELEASE ORAL DAILY
Qty: 30 CAPSULE | Refills: 3 | Status: SHIPPED | OUTPATIENT
Start: 2024-01-29 | End: 2026-07-13

## 2024-02-28 ENCOUNTER — OFFICE VISIT (OUTPATIENT)
Dept: GASTROENTEROLOGY | Facility: CLINIC | Age: 59
End: 2024-02-28
Payer: COMMERCIAL

## 2024-02-28 VITALS
HEIGHT: 63 IN | SYSTOLIC BLOOD PRESSURE: 152 MMHG | TEMPERATURE: 98.9 F | WEIGHT: 168 LBS | BODY MASS INDEX: 29.77 KG/M2 | DIASTOLIC BLOOD PRESSURE: 77 MMHG

## 2024-02-28 DIAGNOSIS — K21.9 GASTROESOPHAGEAL REFLUX DISEASE, UNSPECIFIED WHETHER ESOPHAGITIS PRESENT: Primary | ICD-10-CM

## 2024-02-28 DIAGNOSIS — R13.19 OTHER DYSPHAGIA: ICD-10-CM

## 2024-02-28 DIAGNOSIS — Z86.010 HISTORY OF COLON POLYPS: ICD-10-CM

## 2024-02-28 PROCEDURE — 99203 OFFICE O/P NEW LOW 30 MIN: CPT | Performed by: INTERNAL MEDICINE

## 2024-02-28 RX ORDER — OMEPRAZOLE 20 MG/1
20 CAPSULE, DELAYED RELEASE ORAL DAILY
COMMUNITY

## 2024-02-28 NOTE — PROGRESS NOTES
[facility]patient [facility] -  [facility]  Edel DRAGAN Garrett 59 y.o. female MRN: [id number]  Encounter: [id number]    ASSESSMENT AND PLAN:    1. Gastroesophageal reflux disease, unspecified whether esophagitis present  2. Other dysphagia   - intermittent and progressively worsening solid and liquid dysphagia. Ddx of EoE, reflux esophagitis, web, ring, motility disorder, etc. No other red flag symptoms like postprandial pain, weight loss, melena. Still actively smoking and reports gaining wait during the past year.   - GERD otherwise ctrled on her current regimen of daily omeprazole 20.   - reasonable to proceed w/ EGD to evaluate ± balloon dilation  - EGD; Future    3. History of colon polyps   - had 1-2 small polyps on 2009, however no report. Interval colonoscopy in 2016 had no polyps   - repeat colon 2026 or sooner if w/ symptoms such as change in BMs.    HPI:    59 y.o. female w/ a PMH of asthma, GERD who presents for EGD/colon.    On PPI 20 daily.    Index colon 2009: no report. She recalls having a few tiny polyps removed.   EGD/colon 11/17/16:   - small HH, Bxed antrum (Hpy neg) and distal esophagus (BE neg).   - sigmoid tics, IH    Intermittent dysphagia w/ both solids and liquids that get stuck in her lower chest. Has to wait for it to pass further down before being able to continue to eat/drink. Has weeks were it's fine and it returns transiently. Anxiety over eating out due to this. No Hx of impactions.    GERD ctrled on prilosec 20 daily. Rarely has breakthrough symptoms. Due to dietary indiscretion. Takes additional prilosec or prn tums.     Previously had IBS, exacerbated by life stressors. Currently doing well, having regular, formed BMs.    REVIEW OF SYSTEMS:  Otherwise pt denies any fevers, chills, lightheadedness, dizziness, CP, palpitations, SOB, N/V/D/C, abdom pain, urinary sxs, weakness/numbness/tingling, skin changes/rashes, weight loss.    Historical Information   Past Medical  History:   Diagnosis Date    Asthma     Papanicolaou smear 2019    Neg     Past Surgical History:   Procedure Laterality Date    BREAST SURGERY Left      SECTION      COLONOSCOPY  2016    DILATION AND CURETTAGE OF UTERUS      X2    ENDOMETRIAL ABLATION      MAMMO (HISTORICAL)  2019    SINUS SURGERY      X2    TUBAL LIGATION       Family History   Problem Relation Age of Onset    Brain cancer Father     Lung cancer Father     Cancer Father         Pharynx    Endometriosis Daughter     Breast cancer Other      Social History     Tobacco Use    Smoking status: Every Day     Current packs/day: 0.50     Average packs/day: 0.5 packs/day for 39.2 years (19.6 ttl pk-yrs)     Types: Cigarettes     Start date:     Smokeless tobacco: Never   Vaping Use    Vaping status: Former    Substances: Nicotine, Flavoring   Substance Use Topics    Alcohol use: Yes     Comment: very rarely    Drug use: Never       Meds/Allergies     Current Outpatient Medications:     albuterol (PROVENTIL HFA,VENTOLIN HFA) 90 mcg/act inhaler    amLODIPine (NORVASC) 5 mg tablet    aspirin 81 mg chewable tablet    atorvastatin (LIPITOR) 40 mg tablet    Cholecalciferol 50 MCG (2000) CAPS    fenofibrate micronized (LOFIBRA) 67 MG capsule    fexofenadine (ALLEGRA) 180 MG tablet    montelukast (SINGULAIR) 10 mg tablet    omeprazole (PriLOSEC) 20 mg delayed release capsule    oxybutynin (DITROPAN) 5 mg tablet    venlafaxine (EFFEXOR-XR) 150 mg 24 hr capsule  Allergies   Allergen Reactions    Cefuroxime Hives       Objective   There were no vitals taken for this visit. There is no height or weight on file to calculate BMI.    PHYSICAL EXAM:    General Appearance:   Alert, cooperative, no distress   HEENT:   Normocephalic, atraumatic, anicteric.     Neck:  Supple, symmetrical, trachea midline   Lungs:   Clear to auscultation bilaterally; no rales, rhonchi or wheezing; respirations unlabored    Heart:   Regular rate and rhythm; no murmur,  rub, or gallop.   Abdomen:   Soft, non-tender, non-distended; normal bowel sounds; no masses, no organomegaly    Genitalia:   Deferred    Rectal:   Deferred    Extremities:  No cyanosis, clubbing or edema    Pulses:  2+ and symmetric    Skin:  No jaundice, rashes, or lesions    Lymph nodes:  No palpable cervical lymphadenopathy      Lab Results:   No visits with results within 1 Day(s) from this visit.   Latest known visit with results is:   Telephone on 09/18/2023   Component Date Value    Supplier Name 09/18/2023 [employer]     Supplier Phone Number 09/18/2023 [phone]     Order Status 09/18/2023 Delivery Successful     Delivery Request Date 09/18/2023 09/18/2023     Date Delivered  09/18/2023 09/18/2023     Item Description 09/18/2023 Pressure Change     Item Description 09/18/2023 PAP Accessory     Item Description 09/18/2023 PAP Mask, Full Face, Fit Upon Setup, N/A, 1 per 3 months     Item Description 09/18/2023 Humidifier Water Chamber, 1 per 6 months     Item Description 09/18/2023 PAP Headgear, 1 per 6 months     Item Description 09/18/2023 PAP Chinstrap, 1 per 6 months     Item Description 09/18/2023 PAP Humidifier, Heated     Item Description 09/18/2023 Heated PAP Tubing, 1 per 3 months     Item Description 09/18/2023 Disposable PAP Filter, 2 per 1 month     Item Description 09/18/2023 Non-Disposable PAP Filter, 1 per 6 months     Item Description 09/18/2023 PAP Mask Interface Cushion, Full Face, 1 per 1 month        Radiology Results:   No results found.

## 2024-02-28 NOTE — PATIENT INSTRUCTIONS
Scheduled date of EGD(as of today):  3/1/24  Physician performing EGD: Dr. Arita  Location of EGD: [place]   Instructions reviewed with patient by: Usha   Clearances:   n/a

## 2024-02-29 ENCOUNTER — ANESTHESIA (OUTPATIENT)
Dept: ANESTHESIOLOGY | Facility: HOSPITAL | Age: 59
End: 2024-02-29

## 2024-02-29 ENCOUNTER — ANESTHESIA EVENT (OUTPATIENT)
Dept: ANESTHESIOLOGY | Facility: HOSPITAL | Age: 59
End: 2024-02-29

## 2024-03-01 ENCOUNTER — ANESTHESIA (OUTPATIENT)
Dept: GASTROENTEROLOGY | Facility: HOSPITAL | Age: 59
End: 2024-03-01

## 2024-03-01 ENCOUNTER — HOSPITAL ENCOUNTER (OUTPATIENT)
Dept: GASTROENTEROLOGY | Facility: HOSPITAL | Age: 59
Setting detail: OUTPATIENT SURGERY
End: 2024-03-01
Payer: COMMERCIAL

## 2024-03-01 ENCOUNTER — ANESTHESIA EVENT (OUTPATIENT)
Dept: GASTROENTEROLOGY | Facility: HOSPITAL | Age: 59
End: 2024-03-01

## 2024-03-01 VITALS
TEMPERATURE: 98.6 F | HEART RATE: 77 BPM | RESPIRATION RATE: 14 BRPM | SYSTOLIC BLOOD PRESSURE: 184 MMHG | OXYGEN SATURATION: 96 % | DIASTOLIC BLOOD PRESSURE: 72 MMHG

## 2024-03-01 DIAGNOSIS — K21.9 GASTROESOPHAGEAL REFLUX DISEASE, UNSPECIFIED WHETHER ESOPHAGITIS PRESENT: ICD-10-CM

## 2024-03-01 DIAGNOSIS — R13.19 OTHER DYSPHAGIA: ICD-10-CM

## 2024-03-01 PROCEDURE — 88305 TISSUE EXAM BY PATHOLOGIST: CPT | Performed by: PATHOLOGY

## 2024-03-01 PROCEDURE — 43450 DILATE ESOPHAGUS 1/MULT PASS: CPT | Performed by: INTERNAL MEDICINE

## 2024-03-01 PROCEDURE — 43239 EGD BIOPSY SINGLE/MULTIPLE: CPT | Performed by: INTERNAL MEDICINE

## 2024-03-01 RX ORDER — LIDOCAINE HYDROCHLORIDE 10 MG/ML
INJECTION, SOLUTION EPIDURAL; INFILTRATION; INTRACAUDAL; PERINEURAL AS NEEDED
Status: DISCONTINUED | OUTPATIENT
Start: 2024-03-01 | End: 2024-03-01

## 2024-03-01 RX ORDER — SODIUM CHLORIDE, SODIUM LACTATE, POTASSIUM CHLORIDE, CALCIUM CHLORIDE 600; 310; 30; 20 MG/100ML; MG/100ML; MG/100ML; MG/100ML
INJECTION, SOLUTION INTRAVENOUS CONTINUOUS PRN
Status: DISCONTINUED | OUTPATIENT
Start: 2024-03-01 | End: 2024-03-01

## 2024-03-01 RX ORDER — SODIUM CHLORIDE, SODIUM LACTATE, POTASSIUM CHLORIDE, CALCIUM CHLORIDE 600; 310; 30; 20 MG/100ML; MG/100ML; MG/100ML; MG/100ML
125 INJECTION, SOLUTION INTRAVENOUS CONTINUOUS
Status: CANCELLED | OUTPATIENT
Start: 2024-03-01

## 2024-03-01 RX ORDER — SODIUM CHLORIDE, SODIUM LACTATE, POTASSIUM CHLORIDE, CALCIUM CHLORIDE 600; 310; 30; 20 MG/100ML; MG/100ML; MG/100ML; MG/100ML
125 INJECTION, SOLUTION INTRAVENOUS CONTINUOUS
Status: DISPENSED | OUTPATIENT
Start: 2024-03-01

## 2024-03-01 RX ORDER — PROPOFOL 10 MG/ML
INJECTION, EMULSION INTRAVENOUS AS NEEDED
Status: DISCONTINUED | OUTPATIENT
Start: 2024-03-01 | End: 2024-03-01

## 2024-03-01 RX ADMIN — LIDOCAINE HYDROCHLORIDE 100 MG: 10 INJECTION, SOLUTION EPIDURAL; INFILTRATION; INTRACAUDAL; PERINEURAL at 13:10

## 2024-03-01 RX ADMIN — PROPOFOL 40 MG: 10 INJECTION, EMULSION INTRAVENOUS at 13:12

## 2024-03-01 RX ADMIN — PROPOFOL 40 MG: 10 INJECTION, EMULSION INTRAVENOUS at 13:16

## 2024-03-01 RX ADMIN — SODIUM CHLORIDE, SODIUM LACTATE, POTASSIUM CHLORIDE, AND CALCIUM CHLORIDE 125 ML/HR: .6; .31; .03; .02 INJECTION, SOLUTION INTRAVENOUS at 12:55

## 2024-03-01 RX ADMIN — PROPOFOL 40 MG: 10 INJECTION, EMULSION INTRAVENOUS at 13:18

## 2024-03-01 RX ADMIN — SODIUM CHLORIDE, SODIUM LACTATE, POTASSIUM CHLORIDE, AND CALCIUM CHLORIDE: .6; .31; .03; .02 INJECTION, SOLUTION INTRAVENOUS at 13:05

## 2024-03-01 RX ADMIN — PROPOFOL 40 MG: 10 INJECTION, EMULSION INTRAVENOUS at 13:14

## 2024-03-01 RX ADMIN — PROPOFOL 120 MG: 10 INJECTION, EMULSION INTRAVENOUS at 13:10

## 2024-03-01 NOTE — H&P
History and Physical - [facility]  Edel Garrett 59 y.o. female MRN: [id number]    HPI: Edel Garrett is a 59 y.o. year old female who presents for intermittently worsening solid + liquid dysphagia.. Last EGD 11/17/15 showed a small HH, neg for BE or Hpy.    REVIEW OF SYSTEMS: Per the HPI, and otherwise unremarkable.    Historical Information   Past Medical History:   Diagnosis Date    Anxiety     Asthma     CPAP (continuous positive airway pressure) dependence     Hiatal hernia     Irritable bowel syndrome     Papanicolaou smear 2019    Neg    Sleep apnea      Past Surgical History:   Procedure Laterality Date    BREAST SURGERY Left      SECTION      COLONOSCOPY  2016    DILATION AND CURETTAGE OF UTERUS      X2    ENDOMETRIAL ABLATION      MAMMO (HISTORICAL)  2019    SINUS SURGERY      X2    TUBAL LIGATION       Social History   Social History     Substance and Sexual Activity   Alcohol Use Yes    Comment: very rarely     Social History     Substance and Sexual Activity   Drug Use Never     Social History     Tobacco Use   Smoking Status Every Day    Current packs/day: 0.50    Average packs/day: 0.5 packs/day for 39.2 years (19.6 ttl pk-yrs)    Types: Cigarettes    Start date:    Smokeless Tobacco Never     Family History   Problem Relation Age of Onset    Brain cancer Father     Lung cancer Father     Cancer Father         Pharynx    Endometriosis Daughter     Breast cancer Other        Meds/Allergies       Current Outpatient Medications:     albuterol (PROVENTIL HFA,VENTOLIN HFA) 90 mcg/act inhaler    aspirin 81 mg chewable tablet    fexofenadine (ALLEGRA) 180 MG tablet    omeprazole (PriLOSEC) 20 mg delayed release capsule    oxybutynin (DITROPAN) 5 mg tablet    venlafaxine (EFFEXOR-XR) 150 mg 24 hr capsule    amLODIPine (NORVASC) 5 mg tablet    atorvastatin (LIPITOR) 40 mg tablet    Cholecalciferol 50 MCG (2000) CAPS    fenofibrate micronized (LOFIBRA) 67 MG  capsule    montelukast (SINGULAIR) 10 mg tablet    omeprazole (PriLOSEC) 20 mg delayed release capsule    Current Facility-Administered Medications:     lactated ringers infusion, 125 mL/hr, Intravenous, Continuous    Allergies   Allergen Reactions    Cefuroxime Hives       Objective   /81   Pulse 75   Temp 98.6 °F (37 °C) (Temporal)   Resp 16   SpO2 96%     PHYSICAL EXAM  Gen: NAD  Head: NCAT  CV: RRR  CHEST: CTAB  ABD: soft, NT/ND  EXT: no edema    ASSESSMENT/PLAN:  This is a 59 y.o. year old female here for EGD, and she is stable and optimized for her procedure.

## 2024-03-01 NOTE — ANESTHESIA PREPROCEDURE EVALUATION
Procedure:  EGD    Relevant Problems   CARDIO   (+) Coronary artery calcification seen on CAT scan   (+) Essential hypertension   (+) Mixed hyperlipidemia      GI/HEPATIC   (+) Gastroesophageal reflux disease      NEURO/PSYCH   (+) Anxiety and depression      PULMONARY   (+) Asthma   (+) Severe obstructive sleep apnea   (+) Upper respiratory tract infection      Digestive   (+) Irritable bowel syndrome      Other   (+) Overweight (BMI 25.0-29.9)   (+) Tobacco use disorder        Physical Exam    Airway    Mallampati score: I  TM Distance: >3 FB  Neck ROM: full     Dental        Cardiovascular      Pulmonary      Other Findings  post-pubertal.      Anesthesia Plan  ASA Score- 2     Anesthesia Type- IV sedation with anesthesia with ASA Monitors.         Additional Monitors:     Airway Plan:            Plan Factors-Exercise tolerance (METS): >4 METS.    Chart reviewed.    Patient summary reviewed.    Patient is a current smoker.  Patient smoked on day of surgery.            Induction- intravenous.    Postoperative Plan-     Informed Consent- Anesthetic plan and risks discussed with patient.  I personally reviewed this patient with the CRNA. Discussed and agreed on the Anesthesia Plan with the CRNA..

## 2024-03-01 NOTE — ANESTHESIA POSTPROCEDURE EVALUATION
Post-Op Assessment Note    CV Status:  Stable  Pain Score: 0    Pain management: adequate       Mental Status:  Alert   Hydration Status:  Euvolemic   PONV Controlled:  None   Airway Patency:  Patent     Post Op Vitals Reviewed: Yes    No anethesia notable event occurred.    Staff: CRNA               BP   94/58   Temp      Pulse  73   Resp   21   SpO2   99

## 2024-03-04 PROCEDURE — 88305 TISSUE EXAM BY PATHOLOGIST: CPT | Performed by: PATHOLOGY

## 2024-03-19 ENCOUNTER — TELEPHONE (OUTPATIENT)
Age: 59
End: 2024-03-19

## 2024-03-19 NOTE — TELEPHONE ENCOUNTER
Patients GI provider:       Number to return call: ( [phone]    Reason for call: Pt calling asking to schedule ESOPH MANOMETRY/24HR PH please advise    Scheduled procedure/appointment date if applicable: Apt/procedure

## 2024-03-19 NOTE — TELEPHONE ENCOUNTER
I called the patient and scheduled the manometry for 4/2/24 In [place] for 1PM. Instructions sent to email.

## 2024-03-20 ENCOUNTER — TELEPHONE (OUTPATIENT)
Dept: GASTROENTEROLOGY | Facility: HOSPITAL | Age: 59
End: 2024-03-20

## 2024-03-26 ENCOUNTER — TELEPHONE (OUTPATIENT)
Dept: GASTROENTEROLOGY | Facility: HOSPITAL | Age: 59
End: 2024-03-26

## 2024-03-27 DIAGNOSIS — Z00.6 ENCOUNTER FOR EXAMINATION FOR NORMAL COMPARISON OR CONTROL IN CLINICAL RESEARCH PROGRAM: ICD-10-CM

## 2024-04-02 ENCOUNTER — HOSPITAL ENCOUNTER (OUTPATIENT)
Dept: GASTROENTEROLOGY | Facility: HOSPITAL | Age: 59
Discharge: HOME | End: 2024-04-02
Attending: INTERNAL MEDICINE
Payer: COMMERCIAL

## 2024-04-02 VITALS
SYSTOLIC BLOOD PRESSURE: 158 MMHG | HEART RATE: 74 BPM | DIASTOLIC BLOOD PRESSURE: 81 MMHG | TEMPERATURE: 98.7 F | RESPIRATION RATE: 18 BRPM | OXYGEN SATURATION: 96 %

## 2024-04-02 DIAGNOSIS — K21.9 GASTROESOPHAGEAL REFLUX DISEASE, UNSPECIFIED WHETHER ESOPHAGITIS PRESENT: ICD-10-CM

## 2024-04-02 PROCEDURE — 91010 ESOPHAGUS MOTILITY STUDY: CPT

## 2024-04-02 PROCEDURE — 91038 ESOPH IMPED FUNCT TEST > 1HR: CPT

## 2024-04-02 NOTE — PERIOPERATIVE NURSING NOTE
"Patient brought in the room and  was educated on procedure with  present in the room with the patient. Lidocaine 2% administered to right nostril by sniffing the Lidocaine up and through the nasal passage.  Patient with gagging with administration. Medication was allowed to take affect.  Manometry  attempted to be placed via right nostril.  This nurse was unable to cannulate the postrior pharynx.  The patient was then given lidocaine in the left nostril.  This nurse was also unable to cannulate the posterior pharynx and the left side was \"tighter\" and did not get as far.  The attempt was made again to the right nostril.  This Nurse then got co worker Brittany Parkinson RN and she attempted insertion.  After several attempts, the catheter was positioned and secured.   This Nurse then proceeded with the study.10 liquid swallows, 10 viscous swallow, 1 rapid swallow,5 upright swallows and 1 rapid drink challenge with 175 cc of water tolerated over a 30 second time frame performed. Patient tolerated procedure and catheter removed intact. PH probe inserted via the right nostril by Brittany Parkinson also after several attempts with both nurses. I was unable to insert the catheter. The PH was positioned and secured. Zephr recorder teach back performed and patient verbalized understanding. Patient instructed to return next day to have with the PH log sheet to have the probe removed.  Discharge instructions given and patient ambulated out of the room in stable condition.  "

## 2024-04-10 DIAGNOSIS — K22.4 HYPERCONTRACTILE ESOPHAGUS: Primary | ICD-10-CM

## 2024-04-10 RX ORDER — NIFEDIPINE 10 MG/1
10 CAPSULE ORAL 3 TIMES DAILY
Qty: 270 CAPSULE | Refills: 1 | Status: SHIPPED | OUTPATIENT
Start: 2024-04-10

## 2024-07-08 ENCOUNTER — TELEPHONE (OUTPATIENT)
Age: 59
End: 2024-07-08

## 2024-07-08 NOTE — TELEPHONE ENCOUNTER
Patient requesting refill of venlafaxine XR 75mg (not on active med list)    Reason for call:   [x] Refill   [] Prior Auth  [] Other:     Office:   [x] PCP/Provider - Feliberto VALENCIA / João    Medication: venlafaxine XR    Dose/Frequency: 75mg qd    Quantity: 100    Pharmacy: [facility] [place], [address]     Does the patient have enough for 3 days?   [] Yes   [x] No - Send as HP to POD

## 2024-07-09 DIAGNOSIS — F32.A ANXIETY AND DEPRESSION: ICD-10-CM

## 2024-07-09 DIAGNOSIS — K22.4 HYPERCONTRACTILE ESOPHAGUS: ICD-10-CM

## 2024-07-09 DIAGNOSIS — F41.9 ANXIETY AND DEPRESSION: ICD-10-CM

## 2024-07-09 RX ORDER — VENLAFAXINE HYDROCHLORIDE 150 MG/1
150 CAPSULE, EXTENDED RELEASE ORAL DAILY
Qty: 30 CAPSULE | Refills: 3 | Status: SHIPPED | OUTPATIENT
Start: 2024-07-09 | End: 2024-07-09

## 2024-07-09 RX ORDER — VENLAFAXINE HYDROCHLORIDE 150 MG/1
150 CAPSULE, EXTENDED RELEASE ORAL DAILY
Qty: 100 CAPSULE | Refills: 1 | Status: SHIPPED | OUTPATIENT
Start: 2024-07-09 | End: 2026-12-22

## 2024-07-10 RX ORDER — NIFEDIPINE 10 MG/1
CAPSULE ORAL
Qty: 270 CAPSULE | Refills: 1 | Status: SHIPPED | OUTPATIENT
Start: 2024-07-10

## 2024-07-31 ENCOUNTER — NURSE TRIAGE (OUTPATIENT)
Age: 59
End: 2024-07-31

## 2024-07-31 NOTE — TELEPHONE ENCOUNTER
"Reason for Disposition   Unexplained headache that is present > 24 hours    Answer Assessment - Initial Assessment Questions  1. LOCATION: \"Where does it hurt?\"       TEMPLES BEHIND EYES   2. ONSET: \"When did the headache start?\" (Minutes, hours or days)       A WEEK   3. PATTERN: \"Does the pain come and go, or has it been constant since it started?\"      COME AND GOES   4. SEVERITY: \"How bad is the pain?\" and \"What does it keep you from doing?\"  (e.g., Scale 1-10; mild, moderate, or severe)    - MILD (1-3): doesn't interfere with normal activities     - MODERATE (4-7): interferes with normal activities or awakens from sleep     - SEVERE (8-10): excruciating pain, unable to do any normal activities         10  5. RECURRENT SYMPTOM: \"Have you ever had headaches before?\" If Yes, ask: \"When was the last time?\" and \"What happened that time?\"       YES ,MIGRAINES IN THE PAST   6. CAUSE: \"What do you think is causing the headache?\"      UNKNOWN   7. MIGRAINE: \"Have you been diagnosed with migraine headaches?\" If Yes, ask: \"Is this headache similar?\"       YES   8. HEAD INJURY: \"Has there been any recent injury to the head?\"       NO   9. OTHER SYMPTOMS: \"Do you have any other symptoms?\" (fever, stiff neck, eye pain, sore throat, cold symptoms)      EYE PAIN,PATIENT DENIES ALL ABOVE SYMPTOMS ,DIARRHEA LAST WEEK   10. PREGNANCY: \"Is there any chance you are pregnant?\" \"When was your last menstrual period?\"        NO  PATIENT STATED SHE HAD LEFT OVER AUGMENTIN TABLETS ,PATIENT STARTED TAKING THE MEDICATION ON 7/26,PATIENT THOUGHT SHE HAD A SINUS INFECTION,  PATIENT DECLINED ER SERVICES   APPT SCHEDULED FOR TOMORROW    Protocols used: Headache-ADULT-[place]    "

## 2024-08-01 ENCOUNTER — OFFICE VISIT (OUTPATIENT)
Dept: FAMILY MEDICINE CLINIC | Facility: CLINIC | Age: 59
End: 2024-08-01
Payer: COMMERCIAL

## 2024-08-01 VITALS
TEMPERATURE: 97.4 F | HEIGHT: 63 IN | OXYGEN SATURATION: 98 % | BODY MASS INDEX: 29.45 KG/M2 | HEART RATE: 86 BPM | SYSTOLIC BLOOD PRESSURE: 132 MMHG | WEIGHT: 166.2 LBS | DIASTOLIC BLOOD PRESSURE: 82 MMHG

## 2024-08-01 DIAGNOSIS — Z12.31 ENCOUNTER FOR SCREENING MAMMOGRAM FOR BREAST CANCER: ICD-10-CM

## 2024-08-01 DIAGNOSIS — E78.2 MIXED HYPERLIPIDEMIA: Primary | ICD-10-CM

## 2024-08-01 DIAGNOSIS — J01.10 SUBACUTE FRONTAL SINUSITIS: ICD-10-CM

## 2024-08-01 DIAGNOSIS — I10 PRIMARY HYPERTENSION: ICD-10-CM

## 2024-08-01 DIAGNOSIS — K22.4 HYPERCONTRACTILE ESOPHAGUS: ICD-10-CM

## 2024-08-01 DIAGNOSIS — K21.9 GASTROESOPHAGEAL REFLUX DISEASE, UNSPECIFIED WHETHER ESOPHAGITIS PRESENT: ICD-10-CM

## 2024-08-01 DIAGNOSIS — F17.200 TOBACCO USE DISORDER: ICD-10-CM

## 2024-08-01 DIAGNOSIS — I10 BENIGN ESSENTIAL HTN: ICD-10-CM

## 2024-08-01 PROBLEM — J06.9 UPPER RESPIRATORY TRACT INFECTION: Status: RESOLVED | Noted: 2022-12-16 | Resolved: 2024-08-01

## 2024-08-01 PROCEDURE — 99214 OFFICE O/P EST MOD 30 MIN: CPT | Performed by: INTERNAL MEDICINE

## 2024-08-01 RX ORDER — OMEPRAZOLE 20 MG/1
20 CAPSULE, DELAYED RELEASE ORAL DAILY
Qty: 90 CAPSULE | Refills: 3 | Status: SHIPPED | OUTPATIENT
Start: 2024-08-01 | End: 2025-07-27

## 2024-08-01 RX ORDER — ATORVASTATIN CALCIUM 40 MG/1
40 TABLET, FILM COATED ORAL DAILY
Qty: 90 TABLET | Refills: 3 | Status: SHIPPED | OUTPATIENT
Start: 2024-08-01

## 2024-08-01 RX ORDER — FENOFIBRATE 67 MG/1
67 CAPSULE ORAL
Qty: 90 CAPSULE | Refills: 3 | Status: SHIPPED | OUTPATIENT
Start: 2024-08-01

## 2024-08-01 RX ORDER — DOXYCYCLINE HYCLATE 100 MG/1
100 CAPSULE ORAL EVERY 12 HOURS SCHEDULED
Qty: 20 CAPSULE | Refills: 0 | Status: SHIPPED | OUTPATIENT
Start: 2024-08-01 | End: 2024-08-11

## 2024-08-01 RX ORDER — AMLODIPINE BESYLATE 5 MG/1
5 TABLET ORAL DAILY
Qty: 145 TABLET | Refills: 3 | Status: SHIPPED | OUTPATIENT
Start: 2024-08-01

## 2024-08-01 RX ORDER — METHYLPREDNISOLONE 4 MG/1
TABLET ORAL
Qty: 21 EACH | Refills: 0 | Status: SHIPPED | OUTPATIENT
Start: 2024-08-01

## 2024-08-01 NOTE — ASSESSMENT & PLAN NOTE
Her blood pressure is currently well controlled though she told me she was on Norvasc from a cardiologist which was not on her chart

## 2024-08-01 NOTE — PROGRESS NOTES
Ambulatory Visit  Name: Edel Garrett      : 1965      MRN: [id number]  Encounter Provider: Chandrika Moyer MD  Encounter Date: 2024   Encounter department: [facility]    Assessment & Plan   1. Mixed hyperlipidemia  Assessment & Plan:  She continues on her atorvastatin and then of February  Orders:  -     atorvastatin (LIPITOR) 40 mg tablet; Take 1 tablet (40 mg total) by mouth daily  -     fenofibrate micronized (LOFIBRA) 67 MG capsule; Take 1 capsule (67 mg total) by mouth daily with breakfast  2. Encounter for screening mammogram for breast cancer  -     Mammo screening bilateral w 3d & cad; Future; Expected date: 2025  3. Hypercontractile esophagus  4. Gastroesophageal reflux disease, unspecified whether esophagitis present  Assessment & Plan:  Her symptoms are much better ever since she was dilated with an EGD  Orders:  -     omeprazole (PriLOSEC) 20 mg delayed release capsule; Take 1 capsule (20 mg total) by mouth daily  5. Primary hypertension  -     amLODIPine (NORVASC) 5 mg tablet; Take 1 tablet (5 mg total) by mouth daily Take 1 and 1/5 tab daily  6. Tobacco use disorder  Assessment & Plan:  She continues to smoke and he again was counseled 310 minutes on the benefits of and ways to quit smoking. Unfortunately her significant other also smokes  7. Benign essential HTN  Assessment & Plan:  Her blood pressure is currently well controlled though she told me she was on Norvasc from a cardiologist which was not on her chart  8. Subacute frontal sinusitis  -     methylPREDNISolone 4 MG tablet therapy pack; Use as directed on package  -     doxycycline hyclate (VIBRAMYCIN) 100 mg capsule; Take 1 capsule (100 mg total) by mouth every 12 (twelve) hours for 10 days       History of Present Illness     Patient actually thinks she has a sinus infection is doing her headaches. However she does still smoke so if it persists she will have to let me know. He did take some left over  "Augmentin at home with no relief.    Sinusitis  This is a new problem. The current episode started 1 to 4 weeks ago. The problem has been gradually worsening since onset. There has been no fever. The pain is moderate. Associated symptoms include congestion, ear pain, headaches and sinus pressure. Pertinent negatives include no chills, coughing, diaphoresis, hoarse voice, shortness of breath, sneezing, sore throat or swollen glands. Past treatments include antibiotics and acetaminophen (augmentin). The treatment provided no relief.       Review of Systems   Constitutional:  Negative for chills, diaphoresis and unexpected weight change.   HENT:  Positive for congestion, ear pain, postnasal drip, sinus pressure and sinus pain. Negative for dental problem, facial swelling, hoarse voice, sneezing and sore throat.    Respiratory:  Negative for cough, shortness of breath and wheezing.    Cardiovascular:  Negative for chest pain and leg swelling.   Gastrointestinal:         Indigestion   Endocrine: Negative.    Genitourinary: Negative.    Musculoskeletal: Negative.    Allergic/Immunologic: Negative.    Neurological:  Positive for headaches.   Hematological: Negative.    Psychiatric/Behavioral: Negative.         Objective     /82 (BP Location: Left arm, Patient Position: Sitting, Cuff Size: Standard)   Pulse 86   Temp (!) 97.4 °F (36.3 °C)   Ht 5' 2.5\" (1.588 m)   Wt 75.4 kg (166 lb 3.2 oz)   SpO2 98%   BMI 29.91 kg/m²     Physical Exam  Vitals and nursing note reviewed.   Constitutional:       General: She is not in acute distress.     Appearance: She is well-developed.   HENT:      Head: Normocephalic and atraumatic.      Ears:      Comments: Mild erythema     Nose: Congestion present.      Mouth/Throat:      Mouth: Mucous membranes are moist.   Eyes:      Conjunctiva/sclera: Conjunctivae normal.   Cardiovascular:      Rate and Rhythm: Normal rate and regular rhythm.      Heart sounds: No murmur " heard.  Pulmonary:      Effort: Pulmonary effort is normal. No respiratory distress.      Breath sounds: Normal breath sounds.   Abdominal:      General: Bowel sounds are normal.      Palpations: Abdomen is soft.      Tenderness: There is no abdominal tenderness.   Musculoskeletal:         General: No swelling.      Cervical back: Neck supple.      Right lower leg: No edema.      Left lower leg: No edema.   Lymphadenopathy:      Cervical: No cervical adenopathy.   Skin:     General: Skin is warm and dry.      Capillary Refill: Capillary refill takes less than 2 seconds.      Findings: No erythema or rash.   Neurological:      General: No focal deficit present.      Mental Status: She is alert and oriented to person, place, and time. Mental status is at baseline.   Psychiatric:         Mood and Affect: Mood normal.       Administrative Statements

## 2024-08-01 NOTE — ASSESSMENT & PLAN NOTE
She continues to smoke and he again was counseled 310 minutes on the benefits of and ways to quit smoking. Unfortunately her significant other also smokes

## 2024-08-15 ENCOUNTER — VBI (OUTPATIENT)
Dept: ADMINISTRATIVE | Facility: OTHER | Age: 59
End: 2024-08-15

## 2024-08-15 NOTE — TELEPHONE ENCOUNTER
08/15/24 10:38 AM     Chart reviewed for CRC: Colonoscopy ; nothing is submitted to the patient's insurance at this time.     Dary Guardado   [facility]

## 2024-08-21 ENCOUNTER — HOSPITAL ENCOUNTER (EMERGENCY)
Facility: HOSPITAL | Age: 59
Discharge: HOME | End: 2024-08-21
Attending: EMERGENCY MEDICINE
Payer: COMMERCIAL

## 2024-08-21 ENCOUNTER — APPOINTMENT (EMERGENCY)
Dept: RADIOLOGY | Facility: HOSPITAL | Age: 59
End: 2024-08-21
Payer: COMMERCIAL

## 2024-08-21 ENCOUNTER — APPOINTMENT (EMERGENCY)
Dept: CT IMAGING | Facility: HOSPITAL | Age: 59
End: 2024-08-21
Payer: COMMERCIAL

## 2024-08-21 VITALS
HEART RATE: 71 BPM | RESPIRATION RATE: 18 BRPM | WEIGHT: 160 LBS | TEMPERATURE: 98.8 F | HEIGHT: 63 IN | DIASTOLIC BLOOD PRESSURE: 79 MMHG | OXYGEN SATURATION: 96 % | SYSTOLIC BLOOD PRESSURE: 162 MMHG | BODY MASS INDEX: 28.35 KG/M2

## 2024-08-21 DIAGNOSIS — J32.9 CHRONIC SINUSITIS: Primary | ICD-10-CM

## 2024-08-21 LAB
ALBUMIN SERPL BCG-MCNC: 3.8 G/DL (ref 3.5–5)
ALP SERPL-CCNC: 86 U/L (ref 34–104)
ALT SERPL W P-5'-P-CCNC: 25 U/L (ref 7–52)
ANION GAP SERPL CALCULATED.3IONS-SCNC: 8 MMOL/L (ref 4–13)
AST SERPL W P-5'-P-CCNC: 17 U/L (ref 13–39)
BASOPHILS # BLD AUTO: 0.06 THOUSANDS/ÂΜL (ref 0–0.1)
BASOPHILS NFR BLD AUTO: 1 % (ref 0–1)
BILIRUB SERPL-MCNC: 0.2 MG/DL (ref 0.2–1)
BILIRUB UR QL STRIP: NEGATIVE
BUN SERPL-MCNC: 11 MG/DL (ref 5–25)
CALCIUM SERPL-MCNC: 9.2 MG/DL (ref 8.4–10.2)
CHLORIDE SERPL-SCNC: 104 MMOL/L (ref 96–108)
CLARITY UR: CLEAR
CO2 SERPL-SCNC: 27 MMOL/L (ref 21–32)
COLOR UR: YELLOW
CREAT SERPL-MCNC: 1.07 MG/DL (ref 0.6–1.3)
EOSINOPHIL # BLD AUTO: 0.34 THOUSAND/ÂΜL (ref 0–0.61)
EOSINOPHIL NFR BLD AUTO: 5 % (ref 0–6)
ERYTHROCYTE [DISTWIDTH] IN BLOOD BY AUTOMATED COUNT: 13.1 % (ref 11.6–15.1)
ERYTHROCYTE [SEDIMENTATION RATE] IN BLOOD: 27 MM/HOUR (ref 0–29)
EXT PREGNANCY TEST URINE: NEGATIVE
EXT. CONTROL: NORMAL
FLUAV RNA RESP QL NAA+PROBE: NEGATIVE
FLUBV RNA RESP QL NAA+PROBE: NEGATIVE
GFR SERPL CREATININE-BSD FRML MDRD: 57 ML/MIN/1.73SQ M
GLUCOSE SERPL-MCNC: 92 MG/DL (ref 65–140)
GLUCOSE UR STRIP-MCNC: NEGATIVE MG/DL
HCT VFR BLD AUTO: 39.8 % (ref 34.8–46.1)
HGB BLD-MCNC: 13 G/DL (ref 11.5–15.4)
HGB UR QL STRIP.AUTO: NEGATIVE
IMM GRANULOCYTES # BLD AUTO: 0.01 THOUSAND/UL (ref 0–0.2)
IMM GRANULOCYTES NFR BLD AUTO: 0 % (ref 0–2)
KETONES UR STRIP-MCNC: NEGATIVE MG/DL
LEUKOCYTE ESTERASE UR QL STRIP: NEGATIVE
LYMPHOCYTES # BLD AUTO: 2.32 THOUSANDS/ÂΜL (ref 0.6–4.47)
LYMPHOCYTES NFR BLD AUTO: 36 % (ref 14–44)
MCH RBC QN AUTO: 30.8 PG (ref 26.8–34.3)
MCHC RBC AUTO-ENTMCNC: 32.7 G/DL (ref 31.4–37.4)
MCV RBC AUTO: 94 FL (ref 82–98)
MONOCYTES # BLD AUTO: 0.57 THOUSAND/ÂΜL (ref 0.17–1.22)
MONOCYTES NFR BLD AUTO: 9 % (ref 4–12)
NEUTROPHILS # BLD AUTO: 3.11 THOUSANDS/ÂΜL (ref 1.85–7.62)
NEUTS SEG NFR BLD AUTO: 49 % (ref 43–75)
NITRITE UR QL STRIP: NEGATIVE
NRBC BLD AUTO-RTO: 0 /100 WBCS
PH UR STRIP.AUTO: 6.5 [PH]
PLATELET # BLD AUTO: 317 THOUSANDS/UL (ref 149–390)
PMV BLD AUTO: 10.2 FL (ref 8.9–12.7)
POTASSIUM SERPL-SCNC: 3.6 MMOL/L (ref 3.5–5.3)
PROT SERPL-MCNC: 7.1 G/DL (ref 6.4–8.4)
PROT UR STRIP-MCNC: NEGATIVE MG/DL
RBC # BLD AUTO: 4.22 MILLION/UL (ref 3.81–5.12)
RSV RNA RESP QL NAA+PROBE: NEGATIVE
SARS-COV-2 RNA RESP QL NAA+PROBE: NEGATIVE
SODIUM SERPL-SCNC: 139 MMOL/L (ref 135–147)
SP GR UR STRIP.AUTO: 1.01
UROBILINOGEN UR QL STRIP.AUTO: 0.2 E.U./DL
WBC # BLD AUTO: 6.41 THOUSAND/UL (ref 4.31–10.16)

## 2024-08-21 PROCEDURE — 99285 EMERGENCY DEPT VISIT HI MDM: CPT

## 2024-08-21 PROCEDURE — 85025 COMPLETE CBC W/AUTO DIFF WBC: CPT | Performed by: EMERGENCY MEDICINE

## 2024-08-21 PROCEDURE — 71045 X-RAY EXAM CHEST 1 VIEW: CPT

## 2024-08-21 PROCEDURE — 86618 LYME DISEASE ANTIBODY: CPT | Performed by: EMERGENCY MEDICINE

## 2024-08-21 PROCEDURE — 93005 ELECTROCARDIOGRAM TRACING: CPT

## 2024-08-21 PROCEDURE — 70450 CT HEAD/BRAIN W/O DYE: CPT

## 2024-08-21 PROCEDURE — 80053 COMPREHEN METABOLIC PANEL: CPT | Performed by: EMERGENCY MEDICINE

## 2024-08-21 PROCEDURE — 81003 URINALYSIS AUTO W/O SCOPE: CPT | Performed by: EMERGENCY MEDICINE

## 2024-08-21 PROCEDURE — 36415 COLL VENOUS BLD VENIPUNCTURE: CPT | Performed by: EMERGENCY MEDICINE

## 2024-08-21 PROCEDURE — 96360 HYDRATION IV INFUSION INIT: CPT

## 2024-08-21 PROCEDURE — 99284 EMERGENCY DEPT VISIT MOD MDM: CPT | Performed by: EMERGENCY MEDICINE

## 2024-08-21 PROCEDURE — 0241U HB NFCT DS VIR RESP RNA 4 TRGT: CPT | Performed by: EMERGENCY MEDICINE

## 2024-08-21 PROCEDURE — 81025 URINE PREGNANCY TEST: CPT | Performed by: EMERGENCY MEDICINE

## 2024-08-21 PROCEDURE — 96361 HYDRATE IV INFUSION ADD-ON: CPT

## 2024-08-21 PROCEDURE — 85652 RBC SED RATE AUTOMATED: CPT | Performed by: EMERGENCY MEDICINE

## 2024-08-21 RX ADMIN — SODIUM CHLORIDE 1000 ML: 0.9 INJECTION, SOLUTION INTRAVENOUS at 15:59

## 2024-08-21 NOTE — ED PROVIDER NOTES
History  Chief Complaint   Patient presents with    Weakness - Generalized     Pt reports feeling weak, dizzy, tired, headaches, tingly for the past month. She has been on augmentin, doxycyline, steroids, and is not getting better.     59-year-old female presents to the emergency department complaining of congestion and intermittent headaches, feeling like she is in a fog, as well as generalized weakness for about a month.  The patient has been seen by her family doctor more than once for this and has been on doxycycline as well as penicillin-based antibiotics with no real improvement in symptoms.  Patient has also been on steroids.  And nasal medications without benefit.        Prior to Admission Medications   Prescriptions Last Dose Informant Patient Reported? Taking?   Cholecalciferol 50 MCG (2000 UT) CAPS  Self Yes No   Sig: Take 1 capsule by mouth daily   albuterol (PROVENTIL HFA,VENTOLIN HFA) 90 mcg/act inhaler  Self No No   Sig: INHALE 2 PUFFS EVERY 6 (SIX) HOURS AS NEEDED FOR WHEEZING   amLODIPine (NORVASC) 5 mg tablet   No No   Sig: Take 1 tablet (5 mg total) by mouth daily Take 1 and 1/5 tab daily   aspirin 81 mg chewable tablet  Self Yes No   Sig: Chew 81 mg daily   atorvastatin (LIPITOR) 40 mg tablet   No No   Sig: Take 1 tablet (40 mg total) by mouth daily   fenofibrate micronized (LOFIBRA) 67 MG capsule   No No   Sig: Take 1 capsule (67 mg total) by mouth daily with breakfast   fexofenadine (ALLEGRA) 180 MG tablet  Self Yes No   Sig: Take 180 mg by mouth daily    methylPREDNISolone 4 MG tablet therapy pack   No No   Sig: Use as directed on package   omeprazole (PriLOSEC) 20 mg delayed release capsule   Yes No   Sig: Take 20 mg by mouth daily 2X DAILY   omeprazole (PriLOSEC) 20 mg delayed release capsule   No No   Sig: Take 1 capsule (20 mg total) by mouth daily   oxybutynin (DITROPAN) 5 mg tablet  Self Yes No   Sig: Take 5 mg by mouth 2 (two) times a day as needed   venlafaxine (EFFEXOR-XR) 150 mg 24  hr capsule   No No   Sig: Take 1 capsule (150 mg total) by mouth daily      Facility-Administered Medications: None       Past Medical History:   Diagnosis Date    Anxiety     Asthma     CPAP (continuous positive airway pressure) dependence     Hiatal hernia     Irritable bowel syndrome     Papanicolaou smear 2019    Neg    Sleep apnea        Past Surgical History:   Procedure Laterality Date    BREAST SURGERY Left      SECTION      COLONOSCOPY  2016    DILATION AND CURETTAGE OF UTERUS      X2    ENDOMETRIAL ABLATION      MAMMO (HISTORICAL)  2019    SINUS SURGERY      X2    TUBAL LIGATION         Family History   Problem Relation Age of Onset    Brain cancer Father     Lung cancer Father     Cancer Father         Pharynx    Endometriosis Daughter     Breast cancer Other      I have reviewed and agree with the history as documented.    E-Cigarette/Vaping    E-Cigarette Use Former User     Comments elf bar      E-Cigarette/Vaping Substances    Nicotine No     THC No     CBD No     Flavoring No     Other No     Unknown No      Social History     Tobacco Use    Smoking status: Every Day     Current packs/day: 0.50     Average packs/day: 0.5 packs/day for 39.6 years (19.8 ttl pk-yrs)     Types: Cigarettes     Start date:     Smokeless tobacco: Never   Vaping Use    Vaping status: Former   Substance Use Topics    Alcohol use: Not Currently     Comment: very rarely    Drug use: Never       Review of Systems   Constitutional:  Positive for activity change and fatigue. Negative for chills and fever.   HENT:  Positive for congestion and sinus pain. Negative for ear pain and sore throat.    Eyes:  Negative for pain and visual disturbance.   Respiratory:  Negative for cough and shortness of breath.    Cardiovascular:  Negative for chest pain and palpitations.   Gastrointestinal:  Negative for abdominal pain and vomiting.   Genitourinary:  Negative for dysuria and hematuria.   Musculoskeletal:  Negative for  arthralgias, back pain and myalgias.   Skin:  Negative for color change and rash.   Neurological:  Positive for headaches. Negative for seizures and syncope.   All other systems reviewed and are negative.      Physical Exam  Physical Exam  Vitals and nursing note reviewed.   Constitutional:       General: She is not in acute distress.     Appearance: Normal appearance. She is well-developed.   HENT:      Head: Normocephalic and atraumatic.      Right Ear: External ear normal.      Left Ear: External ear normal.      Nose: Nose normal.      Mouth/Throat:      Mouth: Mucous membranes are moist.   Eyes:      Conjunctiva/sclera: Conjunctivae normal.   Cardiovascular:      Rate and Rhythm: Normal rate and regular rhythm.      Pulses: Normal pulses.      Heart sounds: Normal heart sounds. No murmur heard.  Pulmonary:      Effort: Pulmonary effort is normal. No respiratory distress.      Breath sounds: Normal breath sounds.   Abdominal:      General: Bowel sounds are normal.      Palpations: Abdomen is soft.      Tenderness: There is no abdominal tenderness.   Musculoskeletal:         General: No swelling or deformity.      Cervical back: Neck supple.   Skin:     General: Skin is warm and dry.      Capillary Refill: Capillary refill takes less than 2 seconds.   Neurological:      General: No focal deficit present.      Mental Status: She is alert and oriented to person, place, and time. Mental status is at baseline.         Vital Signs  ED Triage Vitals [08/21/24 1531]   Temperature Pulse Respirations Blood Pressure SpO2   98.8 °F (37.1 °C) 83 20 (!) 187/91 95 %      Temp Source Heart Rate Source Patient Position - Orthostatic VS BP Location FiO2 (%)   Oral Monitor Sitting Left arm --      Pain Score       No Pain           Vitals:    08/21/24 1531 08/21/24 1630 08/21/24 1645 08/21/24 1715   BP: (!) 187/91 139/75 167/89 162/79   Pulse: 83 72 70 71   Patient Position - Orthostatic VS: Sitting Sitting Sitting Sitting          Visual Acuity      ED Medications  Medications   sodium chloride 0.9 % bolus 1,000 mL (0 mL Intravenous Stopped 8/21/24 1730)       Diagnostic Studies  Results Reviewed       Procedure Component Value Units Date/Time    FLU/RSV/COVID - if FLU/RSV clinically relevant [[id number]]  (Normal) Collected: 08/21/24 1557    Lab Status: Final result Specimen: Nasopharyngeal from Nose Updated: 08/21/24 1726     SARS-CoV-2 Negative     INFLUENZA A PCR Negative     INFLUENZA B PCR Negative     RSV PCR Negative    Narrative:      This test has been performed using the CoV-2/Flu/RSV plus assay on the [employer] platform. This test has been validated by the  and verified by the performing laboratory.     This test is designed to amplify and detect the following: nucleocapsid (N), envelope (E), and RNA-dependent RNA polymerase (RdRP) genes of the SARS-CoV-2 genome; matrix (M), basic polymerase (PB2), and acidic protein (PA) segments of the influenza A genome; matrix (M) and non-structural protein (NS) segments of the influenza B genome, and the nucleocapsid genes of RSV A and RSV B.     Positive results are indicative of the presence of Flu A, Flu B, RSV, and/or SARS-CoV-2 RNA. Positive results for SARS-CoV-2 or suspected novel influenza should be reported to state, local, or federal health departments according to local reporting requirements.      All results should be assessed in conjunction with clinical presentation and other laboratory markers for clinical management.     FOR PEDIATRIC PATIENTS - copy/paste COVID Guidelines URL to browser: [email or web address]       Comprehensive metabolic panel [[id number]] Collected: 08/21/24 3747    Lab Status: Final result Specimen: Blood from Arm, Right Updated: 08/21/24 1629     Sodium 139 mmol/L      Potassium 3.6 mmol/L      Chloride 104 mmol/L      CO2 27 mmol/L      ANION GAP 8 mmol/L      BUN 11 mg/dL       Creatinine 1.07 mg/dL      Glucose 92 mg/dL      Calcium 9.2 mg/dL      AST 17 U/L      ALT 25 U/L      Alkaline Phosphatase 86 U/L      Total Protein 7.1 g/dL      Albumin 3.8 g/dL      Total Bilirubin 0.20 mg/dL      eGFR 57 ml/min/1.73sq m     Narrative:      National Kidney Disease Foundation guidelines for Chronic Kidney Disease (CKD):     Stage 1 with normal or high GFR (GFR > 90 mL/min/1.73 square meters)    Stage 2 Mild CKD (GFR = 60-89 mL/min/1.73 square meters)    Stage 3A Moderate CKD (GFR = 45-59 mL/min/1.73 square meters)    Stage 3B Moderate CKD (GFR = 30-44 mL/min/1.73 square meters)    Stage 4 Severe CKD (GFR = 15-29 mL/min/1.73 square meters)    Stage 5 End Stage CKD (GFR <15 mL/min/1.73 square meters)  Note: GFR calculation is accurate only with a steady state creatinine    Sedimentation rate, automated [[id number]]  (Normal) Collected: 08/21/24 1554    Lab Status: Final result Specimen: Blood from Arm, Left Updated: 08/21/24 1616     Sed Rate 27 mm/hour     UA (URINE) with reflex to Scope [[id number]]  (Normal) Collected: 08/21/24 1608    Lab Status: Final result Specimen: Urine, Clean Catch Updated: 08/21/24 1615     Color, UA Yellow     Clarity, UA Clear     Specific Gravity, UA 1.015     pH, UA 6.5     Leukocytes, UA Negative     Nitrite, UA Negative     Protein, UA Negative mg/dl      Glucose, UA Negative mg/dl      Ketones, UA Negative mg/dl      Urobilinogen, UA 0.2 E.U./dl      Bilirubin, UA Negative     Occult Blood, UA Negative    CBC and differential [[id number]] Collected: 08/21/24 1554    Lab Status: Final result Specimen: Blood from Arm, Right Updated: 08/21/24 1614     WBC 6.41 Thousand/uL      RBC 4.22 Million/uL      Hemoglobin 13.0 g/dL      Hematocrit 39.8 %      MCV 94 fL      MCH 30.8 pg      MCHC 32.7 g/dL      RDW 13.1 %      MPV 10.2 fL      Platelets 317 Thousands/uL      nRBC 0 /100 WBCs      Segmented % 49 %      Immature Grans % 0 %      Lymphocytes % 36 %       Monocytes % 9 %      Eosinophils Relative 5 %      Basophils Relative 1 %      Absolute Neutrophils 3.11 Thousands/µL      Absolute Immature Grans 0.01 Thousand/uL      Absolute Lymphocytes 2.32 Thousands/µL      Absolute Monocytes 0.57 Thousand/µL      Eosinophils Absolute 0.34 Thousand/µL      Basophils Absolute 0.06 Thousands/µL     Lyme Total AB W Reflex to IGM/IGG [[id number]] Collected: 08/21/24 1555    Lab Status: In process Specimen: Blood from Arm, Right Updated: 08/21/24 1611    Narrative:      The following orders were created for panel order Lyme Total AB W Reflex to IGM/IGG.  Procedure                               Abnormality         Status                     ---------                               -----------         ------                     Lyme Total AB W Reflex t...[[id number]]                      In process                   Please view results for these tests on the individual orders.    Lyme Total AB W Reflex to IGM/IGG [[id number]] Collected: 08/21/24 1555    Lab Status: In process Specimen: Blood from Arm, Right Updated: 08/21/24 1611    POCT pregnancy, urine [[id number]]  (Normal) Resulted: 08/21/24 1607    Lab Status: Final result Updated: 08/21/24 1607     EXT Preg Test, Ur Negative     Control Valid                   CT head without contrast   Final Result by Vinod Portillo DO (08/21 1640)      No acute intracranial abnormality.                  Workstation performed: [id number]         XR chest 1 view portable   Final Result by Paco Ramirez MD (08/21 1636)      No acute cardiopulmonary disease.            Workstation performed: [id number]                    Procedures  Procedures         ED Course                                 SBIRT 20yo+      Flowsheet Row Most Recent Value   Initial Alcohol Screen: US AUDIT-C     1. How often do you have a drink containing alcohol? 0 Filed at: 08/21/2024 153   2. How many drinks containing alcohol do you have on a typical day you are  drinking?  0 Filed at: 08/21/2024 1535   3b. FEMALE Any Age, or MALE 65+: How often do you have 4 or more drinks on one occassion? 0 Filed at: 08/21/2024 1539   Audit-C Score 0 Filed at: 08/21/2024 1539   GREG: How many times in the past year have you...    Used an illegal drug or used a prescription medication for non-medical reasons? Never Filed at: 08/21/2024 1535                      Medical Decision Making  59-year-old female presents emergency department complaining of sinus pressure and headaches and feeling like she is in a fog.  Patient notes she has felt like this for a long time.  The patient has been seen by her primary care physician and been treated for sinusitis with multiple antibiotics as well as nasal steroids as well as a Medrol Dosepak without significant improvement.  The patient denies any fever or chills but due to concerns decided to come to the ER.  Differential diagnosis is temporal arteritis electrolyte abnormality urinary tract infection cardiac disease, organ failure or intracranial pathology due to generalized weakness that is associated with the symptoms.  Lab testing appears to show no definitive acute issues and CT scan of the head was negative.  The patient was much relieved by this.  The patient was referred to ENT and recommended to use Afrin for the next few days to see if that helps her symptoms.  It appears to be the most consistent with chronic sinus disease.  I told her that more imaging or other testing may be necessary.  Patient discharged.    Amount and/or Complexity of Data Reviewed  Labs: ordered.  Radiology: ordered.                 Disposition  Final diagnoses:   Chronic sinusitis     Time reflects when diagnosis was documented in both MDM as applicable and the Disposition within this note       Time User Action Codes Description Comment    8/21/2024  5:30 PM Alfa Cabral Add [J32.9] Chronic sinusitis           ED Disposition       ED Disposition   Discharge     Condition   Stable    Date/Time   Wed Aug 21, 2024 1732    Comment   Edelvignesh Brownn discharge to home/self care.                   Follow-up Information       Follow up With Specialties Details Why Contact Info Additional Information    CHARY Caraballo Otol[facility]ology On 8/26/2024  [address]  [address]  [facility] [address] [phone] CHARY Bowles [place], [address] [place] [address] [address], [place] [address]    Chandrika Moyer MD Family Medicine, Internal Medicine On 8/27/2024  [address] [place] [address] [address]  [facility] [address]  [phone]               Discharge Medication List as of 8/21/2024  5:32 PM        CONTINUE these medications which have NOT CHANGED    Details   albuterol (PROVENTIL HFA,VENTOLIN HFA) 90 mcg/act inhaler INHALE 2 PUFFS EVERY 6 (SIX) HOURS AS NEEDED FOR WHEEZING, Starting Tue 9/19/2023, Normal      amLODIPine (NORVASC) 5 mg tablet Take 1 tablet (5 mg total) by mouth daily Take 1 and 1/5 tab daily, Starting Thu 8/1/2024, Normal      aspirin 81 mg chewable tablet Chew 81 mg daily, Starting Tue 3/23/2021, Until Thu 8/1/2024, Historical Med      atorvastatin (LIPITOR) 40 mg tablet Take 1 tablet (40 mg total) by mouth daily, Starting Thu 8/1/2024, Normal      Cholecalciferol 50 MCG (2000 UT) CAPS Take 1 capsule by mouth daily, Starting Mon 10/10/2022, Until Thu 8/1/2024, Historical Med      fenofibrate micronized (LOFIBRA) 67 MG capsule Take 1 capsule (67 mg total) by mouth daily with breakfast, Starting Thu 8/1/2024, Normal      fexofenadine (ALLEGRA) 180 MG tablet Take 180 mg by mouth daily , Historical Med      methylPREDNISolone 4 MG tablet therapy pack Use as directed on package, Normal      !! omeprazole (PriLOSEC) 20 mg delayed release capsule Take 20 mg by mouth daily 2X DAILY, Historical Med      !! omeprazole (PriLOSEC) 20 mg delayed release capsule Take 1 capsule (20 mg total) by mouth daily, Starting Thu 8/1/2024, Until Sun 7/27/2025, Normal       oxybutynin (DITROPAN) 5 mg tablet Take 5 mg by mouth 2 (two) times a day as needed, Starting Mon 7/17/2023, Until Thu 8/1/2024 at 2359, Historical Med      venlafaxine (EFFEXOR-XR) 150 mg 24 hr capsule Take 1 capsule (150 mg total) by mouth daily, Starting Tue 7/9/2024, Until Tue 12/22/2026, Normal       !! - Potential duplicate medications found. Please discuss with provider.          No discharge procedures on file.    PDMP Review       None            ED Provider  Electronically Signed by             Alfa Cabral Jr., DO  08/21/24 1073

## 2024-08-22 LAB
ATRIAL RATE: 74 BPM
B BURGDOR IGG+IGM SER QL IA: NEGATIVE
P AXIS: 65 DEGREES
PR INTERVAL: 240 MS
QRS AXIS: 85 DEGREES
QRSD INTERVAL: 88 MS
QT INTERVAL: 382 MS
QTC INTERVAL: 424 MS
T WAVE AXIS: 88 DEGREES
VENTRICULAR RATE: 74 BPM

## 2024-08-22 PROCEDURE — 93010 ELECTROCARDIOGRAM REPORT: CPT | Performed by: INTERNAL MEDICINE

## 2024-09-11 ENCOUNTER — OFFICE VISIT (OUTPATIENT)
Dept: FAMILY MEDICINE CLINIC | Facility: CLINIC | Age: 59
End: 2024-09-11
Payer: COMMERCIAL

## 2024-09-11 VITALS
BODY MASS INDEX: 28.67 KG/M2 | HEART RATE: 89 BPM | DIASTOLIC BLOOD PRESSURE: 78 MMHG | HEIGHT: 63 IN | TEMPERATURE: 97.9 F | OXYGEN SATURATION: 98 % | WEIGHT: 161.8 LBS | SYSTOLIC BLOOD PRESSURE: 134 MMHG

## 2024-09-11 DIAGNOSIS — G43.701 CHRONIC MIGRAINE W/O AURA, NOT INTRACTABLE, W STAT MIGR: Primary | ICD-10-CM

## 2024-09-11 DIAGNOSIS — I10 BENIGN ESSENTIAL HTN: ICD-10-CM

## 2024-09-11 DIAGNOSIS — F32.A ANXIETY AND DEPRESSION: ICD-10-CM

## 2024-09-11 DIAGNOSIS — Z00.00 ANNUAL PHYSICAL EXAM: ICD-10-CM

## 2024-09-11 DIAGNOSIS — F41.9 ANXIETY AND DEPRESSION: ICD-10-CM

## 2024-09-11 PROCEDURE — 99396 PREV VISIT EST AGE 40-64: CPT | Performed by: INTERNAL MEDICINE

## 2024-09-11 PROCEDURE — 99213 OFFICE O/P EST LOW 20 MIN: CPT | Performed by: INTERNAL MEDICINE

## 2024-09-11 RX ORDER — METOPROLOL TARTRATE 50 MG
50 TABLET ORAL EVERY 12 HOURS
Qty: 60 TABLET | Refills: 5 | Status: SHIPPED | OUTPATIENT
Start: 2024-09-11 | End: 2024-09-13

## 2024-09-11 RX ORDER — SUMATRIPTAN 100 MG/1
100 TABLET, FILM COATED ORAL ONCE AS NEEDED
Qty: 10 TABLET | Refills: 1 | Status: SHIPPED | OUTPATIENT
Start: 2024-09-11 | End: 2024-10-11

## 2024-09-11 NOTE — ASSESSMENT & PLAN NOTE
Orders:    metoprolol tartrate (LOPRESSOR) 50 mg tablet; Take 1 tablet (50 mg total) by mouth every 12 (twelve) hours    SUMAtriptan (IMITREX) 100 mg tablet; Take 1 tablet (100 mg total) by mouth once as needed for migraine may repeat in 2 hours if necessary. Max dose 200mg in 24 hour period.    Ambulatory Referral to Neurology; Future

## 2024-09-11 NOTE — PROGRESS NOTES
Adult Annual Physical  Name: Edel Garrett      : 1965      MRN: [id number]  Encounter Provider: Chandrika Moyer MD  Encounter Date: 2024   Encounter department: [facility]    Assessment & Plan  Anxiety and depression           Chronic migraine w/o aura, not intractable, w stat migr    Orders:    metoprolol tartrate (LOPRESSOR) 50 mg tablet; Take 1 tablet (50 mg total) by mouth every 12 (twelve) hours    SUMAtriptan (IMITREX) 100 mg tablet; Take 1 tablet (100 mg total) by mouth once as needed for migraine may repeat in 2 hours if necessary. Max dose 200mg in 24 hour period.    Ambulatory Referral to Neurology; Future    Benign essential HTN         Annual physical exam         Immunizations and preventive care screenings were discussed with patient today. Appropriate education was printed on patient's after visit summary.    Counseling:  Smoking and exercise      Tobacco Cessation Counseling: Tobacco cessation counseling was provided. The patient is sincerely urged to quit consumption of tobacco. She is ready to quit tobacco.         History of Present Illness     Adult Annual Physical:  Patient presents for annual physical. Patient's biggest problems continues to be chronic almost daily migraines. She has been seen in the ER and had a CT of her head. She also wants to get rid of the Effexor which she doesn't think is helping her headaches or her mood and is just making her nasty at the higher dose. She has numerous appointments scheduled including one with ear nose and throat which she believes is a sinus problem also that I think she really needs normal for control of her headaches. She does understand that she needs to quit smoking and that might help her headaches also..     Diet and Physical Activity:  - Diet/Nutrition: well balanced diet and heart healthy (low sodium) diet.  - Exercise: no formal exercise.    General Health:  - Sleep: 4-6 hours of sleep on average.  - Hearing:  "normal hearing bilateral ears.  - Vision: goes for regular eye exams.  - Dental: regular dental visits.    /GYN Health:  - Follows with GYN: yes.   - Menopause: postmenopausal.   - History of STDs: no    Advanced Care Planning:  - Has an advanced directive?: no    - Has a durable medical POA?: no    - ACP document given to patient?: no      Review of Systems   Constitutional:  Positive for fatigue. Negative for chills and fever.   HENT:  Positive for congestion. Negative for ear pain and sore throat.    Eyes:  Negative for pain and visual disturbance.   Respiratory:  Positive for wheezing. Negative for cough and shortness of breath.         Sleep apnea   Cardiovascular:  Negative for chest pain, palpitations and leg swelling.   Gastrointestinal:  Positive for nausea. Negative for abdominal pain and vomiting.   Endocrine: Negative for polydipsia, polyphagia and polyuria.   Genitourinary: Negative.  Negative for dysuria and hematuria.   Musculoskeletal:  Positive for arthralgias. Negative for back pain.   Skin:  Negative for color change and rash.   Neurological:  Positive for headaches. Negative for seizures and syncope.   Hematological: Negative.    Psychiatric/Behavioral:  Positive for dysphoric mood and sleep disturbance.    All other systems reviewed and are negative.        Objective     /78 (BP Location: Left arm, Patient Position: Sitting, Cuff Size: Large)   Pulse 89   Temp 97.9 °F (36.6 °C) (Temporal)   Ht 5' 2.5\" (1.588 m)   Wt 73.4 kg (161 lb 12.8 oz)   SpO2 98%   Breastfeeding No   BMI 29.12 kg/m²     Physical Exam  Constitutional:       Appearance: Normal appearance.   HENT:      Head: Normocephalic and atraumatic.      Right Ear: Tympanic membrane normal.      Left Ear: Tympanic membrane normal.      Nose: Congestion present.      Mouth/Throat:      Mouth: Mucous membranes are dry.   Eyes:      Extraocular Movements: Extraocular movements intact.      Pupils: Pupils are equal, round, and " reactive to light.   Neck:      Vascular: No carotid bruit.   Cardiovascular:      Rate and Rhythm: Normal rate and regular rhythm.      Pulses: Normal pulses.   Pulmonary:      Effort: Pulmonary effort is normal.      Breath sounds: Normal breath sounds.   Abdominal:      General: Abdomen is flat.      Palpations: Abdomen is soft.   Musculoskeletal:      Cervical back: Neck supple.      Right lower leg: No edema.   Neurological:      Mental Status: She is alert.

## 2024-09-13 ENCOUNTER — OFFICE VISIT (OUTPATIENT)
Age: 59
End: 2024-09-13
Payer: COMMERCIAL

## 2024-09-13 VITALS
SYSTOLIC BLOOD PRESSURE: 122 MMHG | BODY MASS INDEX: 29.09 KG/M2 | DIASTOLIC BLOOD PRESSURE: 70 MMHG | WEIGHT: 161.6 LBS | OXYGEN SATURATION: 100 % | HEART RATE: 88 BPM

## 2024-09-13 DIAGNOSIS — F32.A ANXIETY AND DEPRESSION: ICD-10-CM

## 2024-09-13 DIAGNOSIS — G47.33 SEVERE OBSTRUCTIVE SLEEP APNEA: ICD-10-CM

## 2024-09-13 DIAGNOSIS — G44.52 NEW PERSISTENT DAILY HEADACHE: Primary | ICD-10-CM

## 2024-09-13 DIAGNOSIS — F40.240 CLAUSTROPHOBIA: ICD-10-CM

## 2024-09-13 DIAGNOSIS — G43.701 CHRONIC MIGRAINE W/O AURA, NOT INTRACTABLE, W STAT MIGR: ICD-10-CM

## 2024-09-13 DIAGNOSIS — F41.9 ANXIETY AND DEPRESSION: ICD-10-CM

## 2024-09-13 PROCEDURE — 99205 OFFICE O/P NEW HI 60 MIN: CPT

## 2024-09-13 RX ORDER — TOPIRAMATE 25 MG/1
50 TABLET, FILM COATED ORAL
Qty: 60 TABLET | Refills: 3 | Status: SHIPPED | OUTPATIENT
Start: 2024-09-13

## 2024-09-13 RX ORDER — LORAZEPAM 1 MG/1
TABLET ORAL
Qty: 2 TABLET | Refills: 0 | Status: SHIPPED | OUTPATIENT
Start: 2024-09-13

## 2024-09-13 NOTE — PATIENT INSTRUCTIONS
Patient Instructions:    - Would advise the patient to either follow with her existing primary care or find a new primary care provider who will help place the patient on antidepressant/antianxiety medication.  She noted that she would like to be on a consistent antidepressant medication regimen and a stable study dosage.  Patient was taking venlafaxine in the past but her dosage of venlafaxine has seem to change quite frequently over the past year.  Would like for the patient to be on a new antidepressant medication that is not Effexor but would like her to be on this on a consistent daily basis.  - Also, advised patient should also look into potential therapy/psychiatry evaluation in regards to her anxiety/depression.  Advised that may be good to speak with a behavioral therapist on a regular basis as well.  - Advised patient to continue CPAP usage on a nightly basis for her BREE.  She had not been using this over the last month and this could certainly have contributed to worsening of her headaches and migraines.      Additional Testing:   Neurodiagnostic workup: MRI brain with and without contrast ordered. BUN and creatinine should be completed 2-3 days prior to imaging to ensure kidney function is intact.     - ESR and CRP ordered at this time to rule out temporal arteritis      Headache Calendar  Please maintain a headache calendar  Consider using phone applications such as Migraine Shawn or Migraine Diary    Headache/migraine treatment:     Rescue medications (for immediate treatment of a headache):   It is ok to take ibuprofen, acetaminophen or naproxen (Advil, Tylenol,  Aleve, Excedrin) if they help your headaches you should limit these to No more than 3 times a week to avoid medication overuse/rebound headaches.  Would advise the patient specifically to not overuse over-the-counter medications as they can lead to more significant or prominent rebound headaches.  Advised patient to start preventative  medications and only take sumatriptan if absolutely necessary for severe debilitating headache.  She can also take ibuprofen or Tylenol in the future but she should limit to no more than 2 to 3 days out of the week.    For your more moderate to severe migraines take this medication early   - Continue sumatriptan 100 mg tabs - take one at the onset of headache. May repeat one time after 2 hours if pain has not resolved.   (Max 2 a day and 10 a month)     Prescription preventive medications for headaches/migraines   (to take every day to help prevent headaches - not to take at the time of headache):  - Start Topamax 25 mg, take 2 tablets by mouth once daily at bedtime.  For the first week, would like for the patient to take just 1 tablet by mouth once daily at bedtime.  After that, she can start taking 2 tablets at bedtime afterwards.  She will continue this for 6 to 8 weeks and then she will reach out and let me know how she is doing with the medication.    *Typically these types of medications take time until you see the benefit, although some may see improvement in days, often it may take weeks, especially if the medication is being titrated up to a beneficial level. Please contact us if there are any concerns or questions regarding the medication.     Over the counter preventive supplements for headaches/migraines (if you try, try for 3 months straight)  (to take every day to help prevent headaches - not to take at the time of headache):  There are combo pills online of these - none of which regulated by FDA and double check dosing - take appropriate dose only once a day- prevent a migraine, migravent, mind ease, migrelief   [] Magnesium 400mg daily (If any diarrhea or upset stomach, decrease dose  as tolerated)  [] Riboflavin (Vitamin B2) 400mg daily (may make your urine bright/neon yellow)  - All supplements can be purchased online    Lifestyle Recommendations:  [x] SLEEP - Maintain a regular sleep schedule:  Adults need at least 7-8 hours of uninterrupted a night. Maintain good sleep hygiene:  Going to bed and waking up at consistent times, avoiding excessive daytime naps, avoiding caffeinated beverages in the evening, avoid excessive stimulation in the evening and generally using bed primarily for sleeping.  One hour before bedtime would recommend turning lights down lower, decreasing your activity (may read quietly, listen to music at a low volume). When you get into bed, should eliminate all technology (no texting, emailing, playing with your phone, iPad or tablet in bed).  [x] HYDRATION - Maintain good hydration.  Drink  2L of fluid a day (4 typical small water bottles)  [x] DIET - Maintain good nutrition. In particular don't skip meals and try and eat healthy balanced meals regularly.  [x] TRIGGERS - Look for other triggers and avoid them: Limit caffeine to 1-2 cups a day or less. Avoid dietary triggers that you have noticed bring on your headaches (this could include aged cheese, peanuts, MSG, aspartame and nitrates).  [x] EXERCISE - physical exercise as we all know is good for you in many ways, and not only is good for your heart, but also is beneficial for your mental health, cognitive health and  chronic pain/headaches. I would encourage at the least 5 days of physical exercise weekly for at least 30 minutes.     Education and Follow-up  [x] Please call with any questions or concerns. Of course if any new concerning symptoms go to the emergency department.  [x] Follow up in 4 months with Cale CALI

## 2024-09-13 NOTE — ASSESSMENT & PLAN NOTE
- Start Topamax 25 mg, take 2 tablets by mouth once daily at bedtime for migraine prevention.  - Continue Imitrex and over-the-counter medications for abortive therapy, although would not overuse over-the-counter medications.  Would not use more than 2 to 3 days out of the week.  - Patient advised to continue to work on the lifestyle modifications within her control.  Work on trying to get better sleep and using her CPAP for sleep apnea.  Work on trying to start a new antidepressant medication and also potentially talking with a therapist or behavioral health specialist.    Orders:    Ambulatory Referral to Neurology    MRI brain with and without contrast; Future    BUN; Future    Creatinine, serum; Future    Sedimentation rate, automated; Future    C-reactive protein; Future    topiramate (Topamax) 25 mg tablet; Take 2 tablets (50 mg total) by mouth daily at bedtime

## 2024-09-13 NOTE — ASSESSMENT & PLAN NOTE
- Follow-up with pre-existing primary care provider or new primary care provider provider in regards to antidepressant/anxiety anxiolytic medication.  - Recommend following with behavioral health specialist/therapist for anxiety/depression.

## 2024-09-13 NOTE — PROGRESS NOTES
Review of Systems   Constitutional:  Negative for appetite change, fatigue and fever.   HENT: Negative.  Negative for hearing loss, tinnitus, trouble swallowing and voice change.    Eyes:  Positive for photophobia and visual disturbance. Negative for pain.   Respiratory: Negative.  Negative for shortness of breath.    Cardiovascular: Negative.  Negative for palpitations.   Gastrointestinal:  Positive for nausea and vomiting.   Endocrine: Negative.  Negative for cold intolerance.   Genitourinary: Negative.  Negative for dysuria, frequency and urgency.   Musculoskeletal:  Negative for back pain, gait problem, myalgias, neck pain and neck stiffness.   Skin: Negative.  Negative for rash.   Allergic/Immunologic: Negative.    Neurological:  Positive for dizziness, light-headedness and headaches. Negative for tremors, seizures, syncope, facial asymmetry, speech difficulty, weakness and numbness.   Hematological: Negative.  Does not bruise/bleed easily.   Psychiatric/Behavioral: Negative.  Negative for confusion, hallucinations and sleep disturbance.    All other systems reviewed and are negative.

## 2024-09-13 NOTE — PROGRESS NOTES
Ambulatory Visit  Name: Edel Garrett      : 1965      MRN: [id number]  Encounter Provider: Greg Chaudhry PA-C  Encounter Date: 2024   Encounter department: [facility]    Assessment & Plan  New persistent daily headache    I had the pleasure of seeing Edel today in the office at [facility] in [place].  She is presenting today for an initial new patient consultation in regard to headaches.  The patient notes that over the last 2 months she has developed a persistent, daily left-sided headache that occurs around the eye and around the left temple area.  She notes that she does have a significant past history of migraines before.  She notes that the last time that she received migraines for approximately  or  when she was going through menopause.  The patient states that as of 2 months ago she started getting these very intense headaches that do cause her to have significant nausea, vomiting, and other migrainous symptoms as well.  The patient notes that these headaches are occurring almost every day to an extent and also having a very difficult time finding relief from the headaches.  She does note that she is taking ibuprofen and Tylenol on almost a daily basis to help treat the headaches.  However, this does not seem to be effective for eliminating or aborting the headaches.  She notes that she had just received sumatriptan again by her primary care provider and had not yet had the chance to take it.  She notes that she also received metoprolol from her primary care provider but she had an adverse reaction medication noted that her heart rate had actually increased and she was feeling more unsteady on her feet with the medication.  She has since stopped taking the metoprolol and she notified me she would like to start taking amlodipine again for blood pressure.  The patient notes that she has never seen a neurologist in the past before.   She did have a CT head without contrast recently which was ultimately unrevealing for any acute intracranial abnormality.  It was noted that the patient has not had an MRI of the brain thus far.    Based on my evaluation, it did appear that the patient was having chronic migraine headaches without aura.  In regards to what may have set off or triggered the patient's headaches I believe that this is multifactorial.  The patient mentions that for the last year she has been increased and decreased consistently on her dose of Effexor extended release.  It does appear at times that the patient is taking the lowest dose of the medication at 37.5 mg daily and sometimes she is taking the highest at 150 mg daily.  Patient notes that it has been about a week now where she has not taken any of the Effexor medication at all.  She also notes that since the headache started she had not been wearing her CPAP for obstructive sleep apnea over the last month.  She also notes that because of not having Effexor she is feeling more anxious and depressed as well at this time and she notes that she is more emotional 2.  Is noted that the patient is not getting the best sleep at night, and she is not staying adequately hydrated either.  Patient also notes that she is taking Motrin and Tylenol daily, therefore she may be suffering from medication overuse headaches as well.  However, the headaches do seem likely that they are related to migraines.    Moving forward, advised the patient that we would do an MRI brain with and without contrast to rule out any further acute intracranial abnormality.  Noted that we would be completing BUN and creatinine blood work prior to this occurring.  Advised that we should also complete ESR and CRP blood work to check and evaluate and make sure there is no concern for temporal arteritis.  Advised the patient that she does need to get back on a proper antianxiety medication and antihypertensive medication,  although she should do this with her pre-existing primary care provider or a different one in the future as the patient does talk about switching her provider.  Regardless, the patient will be trialed on Topamax 25 mg, take 2 tablets by mouth once daily at bedtime for migraine prevention from neurology.  Although would be in her best interest to be started on a different antidepressant and antihypertensive medication that works well for her.  She was also advised to work on the lifestyle modifications within her control to help decrease the severity and frequency of the headaches as well.  Advised the patient she can continue to use sumatriptan for abortive therapy, and she can use over-the-counter medication although she should limit both to no more than 2 or 3 days out of the week.    - MRI brain with and without contrast ordered, complete BUN and creatinine blood work 2 to 3 days prior.  - ESR and CRP ordered to rule out giant cell arteritis  - Start Topamax 25 mg, take 2 tablets by mouth once daily at bedtime for migraine prevention.  - Continue Imitrex and over-the-counter medications for abortive therapy, although would not overuse over-the-counter medications.  Would not use more than 2 to 3 days out of the week.   -Patient advised to continue to work on the lifestyle modifications within her control.  Work on trying to get better sleep and using her CPAP for sleep apnea.  Work on trying to start a new antidepressant medication and also potentially talking with a therapist or behavioral health specialist.    Orders:    MRI brain with and without contrast; Future    BUN; Future    Creatinine, serum; Future    Sedimentation rate, automated; Future    C-reactive protein; Future    topiramate (Topamax) 25 mg tablet; Take 2 tablets (50 mg total) by mouth daily at bedtime    Chronic migraine w/o aura, not intractable, w stat migr  - Start Topamax 25 mg, take 2 tablets by mouth once daily at bedtime for migraine  prevention.  - Continue Imitrex and over-the-counter medications for abortive therapy, although would not overuse over-the-counter medications.  Would not use more than 2 to 3 days out of the week.  - Patient advised to continue to work on the lifestyle modifications within her control.  Work on trying to get better sleep and using her CPAP for sleep apnea.  Work on trying to start a new antidepressant medication and also potentially talking with a therapist or behavioral health specialist.    Orders:    Ambulatory Referral to Neurology    MRI brain with and without contrast; Future    BUN; Future    Creatinine, serum; Future    Sedimentation rate, automated; Future    C-reactive protein; Future    topiramate (Topamax) 25 mg tablet; Take 2 tablets (50 mg total) by mouth daily at bedtime    Anxiety and depression  - Follow-up with pre-existing primary care provider or new primary care provider provider in regards to antidepressant/anxiety anxiolytic medication.  - Recommend following with behavioral health specialist/therapist for anxiety/depression.       Severe obstructive sleep apnea  - Advised to continue CPAP for BREE  - Follow-up with sleep medicine provider       Claustrophobia  - Lorazepam ordered as needed for claustrophobia with upcoming MRI brain  Orders:    LORazepam (ATIVAN) 1 mg tablet; Take 1 tablet by mouth once the night prior to imaging.  Then, take 1 tablet by mouth once 1 to 2 hours the day of prior to imaging      Patient Instructions   Patient Instructions:    - Would advise the patient to either follow with her existing primary care or find a new primary care provider who will help place the patient on antidepressant/antianxiety medication.  She noted that she would like to be on a consistent antidepressant medication regimen and a stable study dosage.  Patient was taking venlafaxine in the past but her dosage of venlafaxine has seem to change quite frequently over the past year.  Would like for  the patient to be on a new antidepressant medication that is not Effexor but would like her to be on this on a consistent daily basis.  - Also, advised patient should also look into potential therapy/psychiatry evaluation in regards to her anxiety/depression.  Advised that may be good to speak with a behavioral therapist on a regular basis as well.  - Advised patient to continue CPAP usage on a nightly basis for her BREE.  She had not been using this over the last month and this could certainly have contributed to worsening of her headaches and migraines.      Additional Testing:   Neurodiagnostic workup: MRI brain with and without contrast ordered. BUN and creatinine should be completed 2-3 days prior to imaging to ensure kidney function is intact.     - ESR and CRP ordered at this time to rule out temporal arteritis      Headache Calendar  Please maintain a headache calendar  Consider using phone applications such as Migraine Shawn or Migraine Diary    Headache/migraine treatment:     Rescue medications (for immediate treatment of a headache):   It is ok to take ibuprofen, acetaminophen or naproxen (Advil, Tylenol,  Aleve, Excedrin) if they help your headaches you should limit these to No more than 3 times a week to avoid medication overuse/rebound headaches.  Would advise the patient specifically to not overuse over-the-counter medications as they can lead to more significant or prominent rebound headaches.  Advised patient to start preventative medications and only take sumatriptan if absolutely necessary for severe debilitating headache.  She can also take ibuprofen or Tylenol in the future but she should limit to no more than 2 to 3 days out of the week.    For your more moderate to severe migraines take this medication early   - Continue sumatriptan 100 mg tabs - take one at the onset of headache. May repeat one time after 2 hours if pain has not resolved.   (Max 2 a day and 10 a month)     Prescription  preventive medications for headaches/migraines   (to take every day to help prevent headaches - not to take at the time of headache):  - Start Topamax 25 mg, take 2 tablets by mouth once daily at bedtime.  For the first week, would like for the patient to take just 1 tablet by mouth once daily at bedtime.  After that, she can start taking 2 tablets at bedtime afterwards.  She will continue this for 6 to 8 weeks and then she will reach out and let me know how she is doing with the medication.    *Typically these types of medications take time until you see the benefit, although some may see improvement in days, often it may take weeks, especially if the medication is being titrated up to a beneficial level. Please contact us if there are any concerns or questions regarding the medication.     Over the counter preventive supplements for headaches/migraines (if you try, try for 3 months straight)  (to take every day to help prevent headaches - not to take at the time of headache):  There are combo pills online of these - none of which regulated by FDA and double check dosing - take appropriate dose only once a day- prevent a migraine, migravent, mind ease, migrelief   [] Magnesium 400mg daily (If any diarrhea or upset stomach, decrease dose  as tolerated)  [] Riboflavin (Vitamin B2) 400mg daily (may make your urine bright/neon yellow)  - All supplements can be purchased online    Lifestyle Recommendations:  [x] SLEEP - Maintain a regular sleep schedule: Adults need at least 7-8 hours of uninterrupted a night. Maintain good sleep hygiene:  Going to bed and waking up at consistent times, avoiding excessive daytime naps, avoiding caffeinated beverages in the evening, avoid excessive stimulation in the evening and generally using bed primarily for sleeping.  One hour before bedtime would recommend turning lights down lower, decreasing your activity (may read quietly, listen to music at a low volume). When you get into bed,  should eliminate all technology (no texting, emailing, playing with your phone, iPad or tablet in bed).  [x] HYDRATION - Maintain good hydration.  Drink  2L of fluid a day (4 typical small water bottles)  [x] DIET - Maintain good nutrition. In particular don't skip meals and try and eat healthy balanced meals regularly.  [x] TRIGGERS - Look for other triggers and avoid them: Limit caffeine to 1-2 cups a day or less. Avoid dietary triggers that you have noticed bring on your headaches (this could include aged cheese, peanuts, MSG, aspartame and nitrates).  [x] EXERCISE - physical exercise as we all know is good for you in many ways, and not only is good for your heart, but also is beneficial for your mental health, cognitive health and  chronic pain/headaches. I would encourage at the least 5 days of physical exercise weekly for at least 30 minutes.     Education and Follow-up  [x] Please call with any questions or concerns. Of course if any new concerning symptoms go to the emergency department.  [x] Follow up in 4 months with Cale CALI    History of Present Illness   HPI    Current medical illnesses  or past medical history include bilateral carotid artery stenosis, hypertension, coronary artery calcification, severe obstructive sleep apnea, asthma, GERD, irritable bowel syndrome, anxiety and depression, tobacco use disorder, mixed hyperlipidemia      Interval History:    Patient notes that headaches started occurring about 2 months ago and she is not sure as to why.  She noted history of migraines in the past around menopausal age but has not noted to have any significant migraines up until 2 months ago.  Patient [place]  The patient states that she has already been tested for Lyme disease in the ED which was negative.  The patient does a she had been trialed on metoprolol recently for a new antihypertensive medication to also help with migraines.   She noted that when she took this medication the other night she felt as though her heart was racing and she felt very unsteady on her feet as if she was going to fall.  She did not like the side effects of medication and would like to go back on her amlodipine.  Patient notes that with her Effexor she has been increased and decreased on the dosage so often over the last year.  She notes that as of just a week ago she was on 150 mg daily and she had completely stopped the medication and was no longer taking it.  She does not take any other antidepressant medications at this time.    Headaches started at what age? 2 months ago headaches became more severe and frequent, 46-47 years old she did have migraines around the time of menopausal changes  How often do the headaches occur?   - as of 9/13/2024: 30/30 days in the month with having migraines every single day, 30/30 days in the month with severe or debilitating headaches   What time of the day do the headaches start?  No particular time of day, can be much worse at night   How long do the headaches last? Continuous headache throughout the entirety of the day, she does take 2 tablets of Tylenol and 2 tablets of ibuprofen which seemed to help calm the headache down. She has a migraine cap she wears as well which helps.   Are you ever headache free? No    Aura? without aura     Where is your headache located and pain quality? Left side of the face, left eye and left temple area she states. Pounding, throbbing, pressure type of pain.   What is the intensity of pain? Worst 10/10, Average: 2 or 3/10  Associated symptoms:   [x] Nausea       [x] Vomiting   [x] Stiff or sore neck   [x] Problems with concentration  [x] Photophobia     [x]Phonophobia      [x] Osmophobia  [x] Blurred vision   [x] No loss of vision   [x] Prefer quiet, dark room  [x] Light-headed or dizzy     [x] Tinnitus   [x] Hands or feet tingle or feel numb/paresthesias    [x] Lacrimation (left sided)  [x]  Nasal congestion/rhinorrhea (left sided)      Things that make the headache worse? Coughing will make the headaches much worse she states. Bending over can certainly make the headaches worse.     Any positional change headaches? No positional change headaches    Headache triggers: location, seasonal allergies, missing meals, stress    Have you seen someone else for headaches or pain? No  Have you had trigger point injection performed and how often? No  Have you had Botox injection performed and how often? No   Have you had epidural injections or transforaminal injections performed? No  Are you current pregnant or planning on getting pregnant? Post-menopausal   Have you ever had any Brain imaging? Yes, CT head wo contrast, 08/21/2024    Last eye exam: She went to the eye doctor last year, she was suppose to go too her eye doctor appointment yesterday due to a severe migraine headache.     What medications do you take or have you taken for your headaches?   ABORTIVE:    OTC medications: Motrin, Tylenol  Prescription: Sumatriptan (had not tried taking sumatriptan yet)    Past/ failed/contraindicated:  OTC medications: None  Prescription: None    PREVENTIVE:   None    Past/ failed/contraindicated:  Effexor- mg (anxiety), metoprolol (adverse reaction), Celexa, Lexapro (anxiety)      LIFESTYLE  Sleep   - averages: 3 hours of sleep at night, she sleeps throughout the day as well   Problems falling asleep?:   No  Problems staying asleep?:  Yes    - She does have CPAP for BREE she states but has not been using over the last month due to migraines     Physical activity: she did use to go for hikes and exercise since getting the headaches     Water: 2-3, 16 ounce water bottles per day  Caffeine: She does drink tea in the morning, use to drink iced tea infrequently but drinks soda as well     Mood: Anxiety/depression at this time. She does have panic attacks and panic disorder as well. Currently not taking any  antidepressant medication now but recently taking Effexor-XR. She did use to see a psychologist and therapist in regards to her anxiety/depression but has not seen anyone in awhile.     The following portions of the patient's history were reviewed and updated as appropriate: allergies, current medications, past family history, past medical history, past social history, past surgical history and problem list.    Pertinent family history:  Family history of headaches:  no known family members with significant headaches  Any family history of aneurysms - No    - Father had lung cancer with METS to the brain she states     Pertinent social history:  Work: not currently working, she is on a leave of absence  Education: cosmetology license  Lives with boyfriend    Illicit Drugs: denies  Alcohol/tobacco: Tobacco use: smoking 1 pack every 2-3 days for 40 years , social alcohol usage    Review of Systems  I have personally reviewed the MA's review of systems and made changes as necessary.    Medical History Reviewed by provider this encounter:  Tobacco  Allergies  Meds  Problems  Med Hx  Surg Hx  Fam Hx       Past Medical History   Past Medical History:   Diagnosis Date    Anxiety     Asthma     CPAP (continuous positive airway pressure) dependence     Hiatal hernia     Irritable bowel syndrome     Papanicolaou smear 2019    Neg    Sleep apnea      Past Surgical History:   Procedure Laterality Date    BREAST SURGERY Left      SECTION      COLONOSCOPY  2016    DILATION AND CURETTAGE OF UTERUS      X2    ENDOMETRIAL ABLATION      MAMMO (HISTORICAL)  2019    SINUS SURGERY      X2    TUBAL LIGATION       Family History   Problem Relation Age of Onset    Brain cancer Father     Lung cancer Father     Cancer Father         Pharynx    Endometriosis Daughter     Breast cancer Other      Current Outpatient Medications on File Prior to Visit   Medication Sig Dispense Refill    albuterol (PROVENTIL HFA,VENTOLIN  HFA) 90 mcg/act inhaler INHALE 2 PUFFS EVERY 6 (SIX) HOURS AS NEEDED FOR WHEEZING 8.5 g 4    aspirin 81 mg chewable tablet Chew 81 mg daily      atorvastatin (LIPITOR) 40 mg tablet Take 1 tablet (40 mg total) by mouth daily 90 tablet 3    Cholecalciferol 50 MCG (2000 UT) CAPS Take 1 capsule by mouth daily      fenofibrate micronized (LOFIBRA) 67 MG capsule Take 1 capsule (67 mg total) by mouth daily with breakfast 90 capsule 3    fexofenadine (ALLEGRA) 180 MG tablet Take 180 mg by mouth daily       omeprazole (PriLOSEC) 20 mg delayed release capsule Take 1 capsule (20 mg total) by mouth daily 90 capsule 3    oxybutynin (DITROPAN) 5 mg tablet Take 5 mg by mouth 2 (two) times a day as needed      SUMAtriptan (IMITREX) 100 mg tablet Take 1 tablet (100 mg total) by mouth once as needed for migraine may repeat in 2 hours if necessary. Max dose 200mg in 24 hour period. 10 tablet 1    omeprazole (PriLOSEC) 20 mg delayed release capsule Take 20 mg by mouth daily 2X DAILY (Patient not taking: Reported on 9/11/2024)      [DISCONTINUED] metoprolol tartrate (LOPRESSOR) 50 mg tablet Take 1 tablet (50 mg total) by mouth every 12 (twelve) hours (Patient not taking: Reported on 9/13/2024) 60 tablet 5    [DISCONTINUED] venlafaxine (EFFEXOR-XR) 150 mg 24 hr capsule Take 1 capsule (150 mg total) by mouth daily (Patient not taking: Reported on 9/13/2024) 100 capsule 1     No current facility-administered medications on file prior to visit.     Allergies   Allergen Reactions    Cefuroxime Hives      Current Outpatient Medications on File Prior to Visit   Medication Sig Dispense Refill    albuterol (PROVENTIL HFA,VENTOLIN HFA) 90 mcg/act inhaler INHALE 2 PUFFS EVERY 6 (SIX) HOURS AS NEEDED FOR WHEEZING 8.5 g 4    aspirin 81 mg chewable tablet Chew 81 mg daily      atorvastatin (LIPITOR) 40 mg tablet Take 1 tablet (40 mg total) by mouth daily 90 tablet 3    Cholecalciferol 50 MCG (2000 UT) CAPS Take 1 capsule by mouth daily       fenofibrate micronized (LOFIBRA) 67 MG capsule Take 1 capsule (67 mg total) by mouth daily with breakfast 90 capsule 3    fexofenadine (ALLEGRA) 180 MG tablet Take 180 mg by mouth daily       omeprazole (PriLOSEC) 20 mg delayed release capsule Take 1 capsule (20 mg total) by mouth daily 90 capsule 3    oxybutynin (DITROPAN) 5 mg tablet Take 5 mg by mouth 2 (two) times a day as needed      SUMAtriptan (IMITREX) 100 mg tablet Take 1 tablet (100 mg total) by mouth once as needed for migraine may repeat in 2 hours if necessary. Max dose 200mg in 24 hour period. 10 tablet 1    omeprazole (PriLOSEC) 20 mg delayed release capsule Take 20 mg by mouth daily 2X DAILY (Patient not taking: Reported on 9/11/2024)      [DISCONTINUED] metoprolol tartrate (LOPRESSOR) 50 mg tablet Take 1 tablet (50 mg total) by mouth every 12 (twelve) hours (Patient not taking: Reported on 9/13/2024) 60 tablet 5    [DISCONTINUED] venlafaxine (EFFEXOR-XR) 150 mg 24 hr capsule Take 1 capsule (150 mg total) by mouth daily (Patient not taking: Reported on 9/13/2024) 100 capsule 1     No current facility-administered medications on file prior to visit.      Social History     Tobacco Use    Smoking status: Every Day     Current packs/day: 0.50     Average packs/day: 0.5 packs/day for 39.7 years (19.8 ttl pk-yrs)     Types: Cigarettes     Start date: 1985     Passive exposure: Past    Smokeless tobacco: Never   Vaping Use    Vaping status: Former   Substance and Sexual Activity    Alcohol use: Not Currently     Comment: very rarely    Drug use: Never    Sexual activity: Yes     Partners: Male     Birth control/protection: Female Sterilization     Objective     /70 (BP Location: Left arm, Patient Position: Sitting, Cuff Size: Adult)   Pulse 88   Wt 73.3 kg (161 lb 9.6 oz)   SpO2 100%   BMI 29.09 kg/m²     Physical Exam  Neurologic Exam    Physical Exam:                                                                  Vitals:            Constitutional:    /70 (BP Location: Left arm, Patient Position: Sitting, Cuff Size: Adult)   Pulse 88   Wt 73.3 kg (161 lb 9.6 oz)   SpO2 100%   BMI 29.09 kg/m²   BP Readings from Last 3 Encounters:   09/13/24 122/70   09/11/24 134/78   08/21/24 162/79     Pulse Readings from Last 3 Encounters:   09/13/24 88   09/11/24 89   08/21/24 71         Well developed, well nourished, well groomed. No dysmorphic features.       Psychiatric:  Normal behavior and appropriate affect        Neurological Examination:     Mental status/cognitive function:   Orientated to time, place and person. Recent and remote memory intact. Attention span and concentration as well as fund of knowledge are appropriate for age. Normal language and spontaneous speech.    Cranial Nerves:  II-visual fields full.   III, IV, VI-Pupils were equal, round, and reactive to light and accomodation. Extraocular movements were full and conjugate without nystagmus. Conjugate gaze, normal smooth pursuits, normal saccades   V-facial sensation symmetric.    VII-facial expression symmetric, intact forehead wrinkle, strong eye closure, symmetric smile    VIII-hearing grossly intact bilaterally   IX, X-palate elevation symmetric, no dysarthria.   XI-shoulder shrug strength intact    XII-tongue protrusion midline.    Motor Exam: symmetric bulk and tone throughout, no pronator drift. Power/strength 5/5 bilateral upper and lower extremities, no atrophy, fasciculations or abnormal movements noted.   Sensory: grossly intact light touch in all extremities.   Reflexes: brachioradialis 2+, biceps 2+, knee 2+ bilaterally  Coordination: Finger nose finger intact bilaterally, no apparent dysmetria, ataxia or tremor noted  Gait: steady casual and tandem gait.      Results/Data:     CT head without contrast, 08/21/2024:    IMPRESSION:     No acute intracranial abnormality.    Lyme total antibody with reflex to IgM/IgG, 08/21/2024:    Negative    ESR,  08/21/2024:   27    I have reviewed radiology reports from 08/21/2024 including: CT head.,  Lyme antibody, ESR levels    Administrative Statements   I have spent a total time of 60 minutes in caring for this patient on the day of the visit/encounter including Diagnostic results, Risks and benefits of tx options, Instructions for management, Patient and family education, Importance of tx compliance, Risk factor reductions, Impressions, Counseling / Coordination of care, Documenting in the medical record, Reviewing / ordering tests, medicine, procedures  , and Obtaining or reviewing history  .

## 2024-09-20 ENCOUNTER — OFFICE VISIT (OUTPATIENT)
Dept: URGENT CARE | Facility: CLINIC | Age: 59
End: 2024-09-20
Payer: COMMERCIAL

## 2024-09-20 VITALS
SYSTOLIC BLOOD PRESSURE: 122 MMHG | HEART RATE: 80 BPM | TEMPERATURE: 98.2 F | OXYGEN SATURATION: 98 % | DIASTOLIC BLOOD PRESSURE: 78 MMHG

## 2024-09-20 DIAGNOSIS — J01.10 ACUTE NON-RECURRENT FRONTAL SINUSITIS: Primary | ICD-10-CM

## 2024-09-20 PROCEDURE — 99213 OFFICE O/P EST LOW 20 MIN: CPT | Performed by: PHYSICAL MEDICINE & REHABILITATION

## 2024-09-20 NOTE — PROGRESS NOTES
[facility]        NAME: Edel Garrett is a 59 y.o. female  : 1965    MRN: [id number]  DATE: 2024  TIME: 7:32 PM    Assessment and Plan   Acute non-recurrent frontal sinusitis [J01.10]  1. Acute non-recurrent frontal sinusitis  amoxicillin-clavulanate (AUGMENTIN) 875-125 mg per tablet    Ambulatory Referral to Internal Medicine    Ambulatory referral to Psych Services            Patient Instructions       Follow up with PCP in 3-5 days.  Proceed to  ER if symptoms worsen.    If tests are performed, our office will contact you with results only if changes need to made to the care plan discussed with you at the visit. You can review your full results on [facility].    Chief Complaint     Chief Complaint   Patient presents with    Sinusitis         History of Present Illness       Patient presenting with possible sinus infection. She has a sore throat, left earache, cough, sinus pressure. Also with nausea and a headache due to discontinue effexor.         Review of Systems   Review of Systems   Constitutional: Negative.    HENT:  Positive for sinus pressure.    Respiratory:  Positive for cough.    Cardiovascular: Negative.    Gastrointestinal:  Positive for nausea.   Neurological:  Positive for headaches.         Current Medications       Current Outpatient Medications:     amoxicillin-clavulanate (AUGMENTIN) 875-125 mg per tablet, Take 1 tablet by mouth every 12 (twelve) hours for 7 days, Disp: 14 tablet, Rfl: 0    albuterol (PROVENTIL HFA,VENTOLIN HFA) 90 mcg/act inhaler, INHALE 2 PUFFS EVERY 6 (SIX) HOURS AS NEEDED FOR WHEEZING, Disp: 8.5 g, Rfl: 4    aspirin 81 mg chewable tablet, Chew 81 mg daily, Disp: , Rfl:     atorvastatin (LIPITOR) 40 mg tablet, Take 1 tablet (40 mg total) by mouth daily, Disp: 90 tablet, Rfl: 3    Cholecalciferol 50 MCG (2000) CAPS, Take 1 capsule by mouth daily, Disp: , Rfl:     fenofibrate micronized (LOFIBRA) 67 MG capsule, Take 1 capsule (67 mg  total) by mouth daily with breakfast, Disp: 90 capsule, Rfl: 3    fexofenadine (ALLEGRA) 180 MG tablet, Take 180 mg by mouth daily , Disp: , Rfl:     LORazepam (ATIVAN) 1 mg tablet, Take 1 tablet by mouth once the night prior to imaging.  Then, take 1 tablet by mouth once 1 to 2 hours the day of prior to imaging, Disp: 2 tablet, Rfl: 0    omeprazole (PriLOSEC) 20 mg delayed release capsule, Take 20 mg by mouth daily 2X DAILY (Patient not taking: Reported on 2024), Disp: , Rfl:     omeprazole (PriLOSEC) 20 mg delayed release capsule, Take 1 capsule (20 mg total) by mouth daily, Disp: 90 capsule, Rfl: 3    oxybutynin (DITROPAN) 5 mg tablet, Take 5 mg by mouth 2 (two) times a day as needed, Disp: , Rfl:     SUMAtriptan (IMITREX) 100 mg tablet, Take 1 tablet (100 mg total) by mouth once as needed for migraine may repeat in 2 hours if necessary. Max dose 200mg in 24 hour period., Disp: 10 tablet, Rfl: 1    topiramate (Topamax) 25 mg tablet, Take 2 tablets (50 mg total) by mouth daily at bedtime, Disp: 60 tablet, Rfl: 3    Current Allergies     Allergies as of 2024 - Reviewed 2024   Allergen Reaction Noted    Cefuroxime Hives 2013            The following portions of the patient's history were reviewed and updated as appropriate: allergies, current medications, past family history, past medical history, past social history, past surgical history and problem list.     Past Medical History:   Diagnosis Date    Anxiety     Asthma     CPAP (continuous positive airway pressure) dependence     Hiatal hernia     Irritable bowel syndrome     Papanicolaou smear 2019    Neg    Sleep apnea        Past Surgical History:   Procedure Laterality Date    BREAST SURGERY Left      SECTION      COLONOSCOPY  2016    DILATION AND CURETTAGE OF UTERUS      X2    ENDOMETRIAL ABLATION      MAMMO (HISTORICAL)  2019    SINUS SURGERY      X2    TUBAL LIGATION  1993       Family History   Problem Relation Age of  Onset    Brain cancer Father     Lung cancer Father     Cancer Father         Pharynx    Endometriosis Daughter     Breast cancer Other          Medications have been verified.        Objective   /78   Pulse 80   Temp 98.2 °F (36.8 °C)   SpO2 98%        Physical Exam     Physical Exam  Vitals reviewed.   Constitutional:       General: She is not in acute distress.     Appearance: She is ill-appearing.   HENT:      Right Ear: Tympanic membrane is erythematous.      Left Ear: Tympanic membrane is erythematous.      Nose: No congestion or rhinorrhea.      Mouth/Throat:      Pharynx: Posterior oropharyngeal erythema present.   Cardiovascular:      Rate and Rhythm: Normal rate and regular rhythm.      Pulses: Normal pulses.      Heart sounds: Normal heart sounds.   Pulmonary:      Effort: Pulmonary effort is normal. No respiratory distress.      Breath sounds: Normal breath sounds.   Lymphadenopathy:      Cervical: Cervical adenopathy present.   Neurological:      Mental Status: She is alert.

## 2024-09-21 ENCOUNTER — APPOINTMENT (OUTPATIENT)
Dept: LAB | Facility: CLINIC | Age: 59
End: 2024-09-21
Payer: COMMERCIAL

## 2024-09-21 DIAGNOSIS — Z00.6 ENCOUNTER FOR EXAMINATION FOR NORMAL COMPARISON OR CONTROL IN CLINICAL RESEARCH PROGRAM: ICD-10-CM

## 2024-09-21 DIAGNOSIS — G43.701 CHRONIC MIGRAINE W/O AURA, NOT INTRACTABLE, W STAT MIGR: ICD-10-CM

## 2024-09-21 DIAGNOSIS — G44.52 NEW PERSISTENT DAILY HEADACHE: ICD-10-CM

## 2024-09-21 LAB
BUN SERPL-MCNC: 12 MG/DL (ref 5–25)
CREAT SERPL-MCNC: 0.8 MG/DL (ref 0.6–1.3)
CRP SERPL QL: 2.9 MG/L
ERYTHROCYTE [SEDIMENTATION RATE] IN BLOOD: 26 MM/HOUR (ref 0–29)
GFR SERPL CREATININE-BSD FRML MDRD: 81 ML/MIN/1.73SQ M

## 2024-09-21 PROCEDURE — 85652 RBC SED RATE AUTOMATED: CPT

## 2024-09-21 PROCEDURE — 36415 COLL VENOUS BLD VENIPUNCTURE: CPT

## 2024-09-21 PROCEDURE — 86140 C-REACTIVE PROTEIN: CPT

## 2024-09-21 PROCEDURE — 84520 ASSAY OF UREA NITROGEN: CPT

## 2024-09-21 PROCEDURE — 82565 ASSAY OF CREATININE: CPT

## 2024-09-23 ENCOUNTER — TELEPHONE (OUTPATIENT)
Age: 59
End: 2024-09-23

## 2024-09-23 NOTE — TELEPHONE ENCOUNTER
Patient has been added to the Medication Management and Talk Therapy wait list with a referral.    Insurance: [facility]  Insurance Type:    Commercial []   Medicaid []   [place] Medicare []  Location Preference: Anywhere  Provider Preference: none  Virtual: Yes [] No [x]  Were outside resources sent: Yes [x] No []    Presenting Problem  Anxiety

## 2024-09-24 ENCOUNTER — HOSPITAL ENCOUNTER (OUTPATIENT)
Dept: RADIOLOGY | Facility: IMAGING CENTER | Age: 59
Discharge: HOME | End: 2024-09-24
Payer: COMMERCIAL

## 2024-09-24 DIAGNOSIS — G43.701 CHRONIC MIGRAINE W/O AURA, NOT INTRACTABLE, W STAT MIGR: ICD-10-CM

## 2024-09-24 DIAGNOSIS — G44.52 NEW PERSISTENT DAILY HEADACHE: ICD-10-CM

## 2024-09-24 PROCEDURE — A9585 GADOBUTROL INJECTION: HCPCS | Performed by: INTERNAL MEDICINE

## 2024-09-24 PROCEDURE — 70553 MRI BRAIN STEM W/O & W/DYE: CPT

## 2024-09-24 RX ORDER — GADOBUTROL 604.72 MG/ML
7 INJECTION INTRAVENOUS
Status: COMPLETED | OUTPATIENT
Start: 2024-09-24 | End: 2024-09-24

## 2024-09-24 RX ADMIN — GADOBUTROL 7 ML: 604.72 INJECTION INTRAVENOUS at 14:03

## 2024-09-26 ENCOUNTER — TELEPHONE (OUTPATIENT)
Dept: FAMILY MEDICINE CLINIC | Facility: CLINIC | Age: 59
End: 2024-09-26

## 2024-09-26 NOTE — TELEPHONE ENCOUNTER
Called and l/m for pt to please call back as provider had a few questions about her upcoming appointment.

## 2024-10-04 LAB
APOB+LDLR+PCSK9 GENE MUT ANL BLD/T: NOT DETECTED
BRCA1+BRCA2 DEL+DUP + FULL MUT ANL BLD/T: NOT DETECTED
MLH1+MSH2+MSH6+PMS2 GN DEL+DUP+FUL M: NOT DETECTED

## 2024-10-09 ENCOUNTER — OFFICE VISIT (OUTPATIENT)
Dept: FAMILY MEDICINE CLINIC | Facility: CLINIC | Age: 59
End: 2024-10-09
Payer: COMMERCIAL

## 2024-10-09 VITALS
DIASTOLIC BLOOD PRESSURE: 80 MMHG | SYSTOLIC BLOOD PRESSURE: 124 MMHG | HEIGHT: 63 IN | WEIGHT: 161 LBS | OXYGEN SATURATION: 99 % | RESPIRATION RATE: 18 BRPM | HEART RATE: 89 BPM | BODY MASS INDEX: 28.53 KG/M2 | TEMPERATURE: 97.9 F

## 2024-10-09 DIAGNOSIS — Z00.00 ROUTINE ADULT HEALTH MAINTENANCE: Primary | ICD-10-CM

## 2024-10-09 DIAGNOSIS — R41.89 DISORGANIZED THINKING: ICD-10-CM

## 2024-10-09 DIAGNOSIS — G44.021 INTRACTABLE CHRONIC CLUSTER HEADACHE: ICD-10-CM

## 2024-10-09 DIAGNOSIS — E78.2 MIXED HYPERLIPIDEMIA: ICD-10-CM

## 2024-10-09 DIAGNOSIS — Z23 IMMUNIZATION DUE: ICD-10-CM

## 2024-10-09 DIAGNOSIS — E55.9 VITAMIN D DEFICIENCY: ICD-10-CM

## 2024-10-09 PROCEDURE — 99396 PREV VISIT EST AGE 40-64: CPT | Performed by: NURSE PRACTITIONER

## 2024-10-09 PROCEDURE — 90673 RIV3 VACCINE NO PRESERV IM: CPT | Performed by: NURSE PRACTITIONER

## 2024-10-09 PROCEDURE — 90471 IMMUNIZATION ADMIN: CPT | Performed by: NURSE PRACTITIONER

## 2024-10-09 RX ORDER — AMLODIPINE BESYLATE 2.5 MG/1
2.5 TABLET ORAL DAILY
COMMUNITY

## 2024-10-09 RX ORDER — AMLODIPINE BESYLATE 5 MG/1
5 TABLET ORAL DAILY
COMMUNITY

## 2024-10-09 NOTE — PROGRESS NOTES
Assessment/Plan:      Diagnoses and all orders for this visit:    Routine adult health maintenance  -     Lipid panel; Future  -     TSH + Free T4; Future  -     Magnesium; Future  -     Vitamin D 1,25 dihydroxy; Future  -     UA w Reflex to Microscopic w Reflex to Culture; Future  -     Hepatitis C antibody; Future  -     Mammo screening bilateral w 3d and cad; Future    Immunization due  -     influenza vaccine, recombinant, PF, 0.5 mL IM (Flublok)    Vitamin D deficiency  -     Vitamin D 1,25 dihydroxy; Future    Mixed hyperlipidemia  -     Lipid panel; Future    Intractable chronic cluster headache  -     Ambulatory Referral to Neurology; Future  Disorganized Thinking        Patient is here as a new patient to establish care at this practice.  Patient's most recent PCP provided well throughout and  comprehensive care for her however patient had only very negative things to say concerning the care given.  Patient's chart was reviewed for last office visits notes and do not coincide with what patient is saying about Dr. Moyer and her care.  I attempted to explain to patient that I cannot speak to the care provided by Dr. Garnett but I am sure it was high-quality as she is a physician in good standing with [facility].  It should also be noted that patient is currently under the care of a neurologist for her irretractable headaches.  She also spoke ill of this provider a PA at [facility] neurology.  Physical assessment was essentially unremarkable patient's vital signs are well-controlled and all questions concerning her physical assessment were answered to the best of my ability.Upon advising her that I will not be able to prescribe medication for her today as she currently does not have a headache she was very unhappy.  Attempted to make her understand that neurology is the specialty that deals with her chronic headaches they would be the ones to outline the best plan of care for her treatment.  I tried to explain  trying multiple different medications with what she says is coronary artery blockage is ill-advised.  It should be noted patient is a current daily smoker and I did offer smoking cessation which she was not interested in.  Patient seemed to have a hard time focusing on what she was currently talking about a bit of disorganized thinking could benefit from a referral to psychiatric services.  I will place the order I feel it is important as it is a long wait list to get into talk to that specialty.  As I thought I was not getting making much progress with the patient I did advise I will order labs for her to test some of the areas where she needs to have updates in her labs such as lipids CMP CBC and mag level and we can have a follow-up office visit to discuss these results.  I also did place another stat order for neurology with a different provider at her request.      Subjective:     Patient ID: Edel Garrett is a 59 y.o. female.    HPI    Review of Systems   Neurological:  Positive for headaches.   Psychiatric/Behavioral:  Positive for behavioral problems and sleep disturbance.          Objective:     Physical Exam  Vitals and nursing note reviewed.   Constitutional:       General: She is not in acute distress.     Appearance: She is well-developed. She is not diaphoretic.   HENT:      Head: Normocephalic and atraumatic.      Mouth/Throat:      Mouth: Oropharynx is clear and moist.   Eyes:      Extraocular Movements: EOM normal.      Pupils: Pupils are equal, round, and reactive to light.   Cardiovascular:      Rate and Rhythm: Normal rate and regular rhythm.      Heart sounds: Normal heart sounds.   Pulmonary:      Effort: Pulmonary effort is normal.      Breath sounds: Normal breath sounds.   Abdominal:      Palpations: Abdomen is soft.   Musculoskeletal:         General: Normal range of motion.      Cervical back: Neck supple.   Skin:     General: Skin is dry.   Neurological:      Mental Status: She is alert  and oriented to person, place, and time.   Psychiatric:         Mood and Affect: Mood and affect normal.         Behavior: Behavior normal.         Thought Content: Thought content normal.

## 2024-10-15 ENCOUNTER — OFFICE VISIT (OUTPATIENT)
Dept: URGENT CARE | Facility: CLINIC | Age: 59
End: 2024-10-15
Payer: COMMERCIAL

## 2024-10-15 ENCOUNTER — APPOINTMENT (OUTPATIENT)
Dept: RADIOLOGY | Facility: CLINIC | Age: 59
End: 2024-10-15
Payer: COMMERCIAL

## 2024-10-15 VITALS
RESPIRATION RATE: 18 BRPM | SYSTOLIC BLOOD PRESSURE: 130 MMHG | OXYGEN SATURATION: 97 % | DIASTOLIC BLOOD PRESSURE: 70 MMHG | TEMPERATURE: 97.4 F | HEART RATE: 90 BPM

## 2024-10-15 DIAGNOSIS — R05.1 ACUTE COUGH: ICD-10-CM

## 2024-10-15 DIAGNOSIS — J20.9 ACUTE BRONCHITIS, UNSPECIFIED ORGANISM: Primary | ICD-10-CM

## 2024-10-15 DIAGNOSIS — J45.31 MILD PERSISTENT ASTHMA WITH ACUTE EXACERBATION: ICD-10-CM

## 2024-10-15 PROCEDURE — 99213 OFFICE O/P EST LOW 20 MIN: CPT

## 2024-10-15 PROCEDURE — 71046 X-RAY EXAM CHEST 2 VIEWS: CPT

## 2024-10-15 RX ORDER — AZITHROMYCIN 250 MG/1
TABLET, FILM COATED ORAL
Qty: 6 TABLET | Refills: 0 | Status: SHIPPED | OUTPATIENT
Start: 2024-10-15 | End: 2024-10-19

## 2024-10-15 RX ORDER — ALBUTEROL SULFATE 90 UG/1
2 INHALANT RESPIRATORY (INHALATION) EVERY 6 HOURS PRN
Qty: 8.5 G | Refills: 0 | Status: SHIPPED | OUTPATIENT
Start: 2024-10-15

## 2024-10-15 RX ORDER — PREDNISONE 20 MG/1
40 TABLET ORAL DAILY
Qty: 10 TABLET | Refills: 0 | Status: SHIPPED | OUTPATIENT
Start: 2024-10-15 | End: 2024-10-20

## 2024-10-15 RX ORDER — BENZONATATE 100 MG/1
100 CAPSULE ORAL 3 TIMES DAILY PRN
Qty: 20 CAPSULE | Refills: 0 | Status: SHIPPED | OUTPATIENT
Start: 2024-10-15

## 2024-10-15 RX ORDER — ALBUTEROL SULFATE 0.83 MG/ML
2.5 SOLUTION RESPIRATORY (INHALATION) EVERY 6 HOURS PRN
Qty: 84 ML | Refills: 0 | Status: SHIPPED | OUTPATIENT
Start: 2024-10-15 | End: 2024-10-22

## 2024-10-15 NOTE — PATIENT INSTRUCTIONS
Take steroid and antibiotic as directed for next 5 days. Use inhalers/nebulizer as prescribed for next week. Use flonase with nasal saline up to twice daily for congestion, tylenol for pain/fever, and tessalon perles/coricidin/robitussin as needed for cough. Follow-up with PCP in 3-5 days if no improvement of symptoms. Report to the ER sooner if symptoms worsen.

## 2024-10-15 NOTE — PROGRESS NOTES
[facility]        NAME: Edel Garrett is a 59 y.o. female  : 1965    MRN: [id number]  DATE: October 15, 2024  TIME: 7:26 PM    Assessment and Plan   Acute bronchitis, unspecified organism [J20.9]  1. Acute bronchitis, unspecified organism  azithromycin (ZITHROMAX) 250 mg tablet      2. Acute cough  XR chest pa and lateral      3. Mild persistent asthma with acute exacerbation  predniSONE 20 mg tablet    albuterol (ProAir HFA) 90 mcg/act inhaler    albuterol (2.5 mg/3 mL) 0.083 % nebulizer solution        No acute pneumonia on x-ray per provider read, will f/u with final read by radiologist if findings are significant. Antibiotics and sterodis as directed given asthma history. Offered nebulizer treatment in clinic but patient declined. Refill for nebulizer and inhaler provided. Tessalon perles as directed for cough. VSS in clinic, appears in no acute distress. Educated on use of OTC products for additional relief of symptoms. Advised close follow-up with PCP or to report to the ER if symptoms worsen. Patient verbalizes understanding and agreeable to plan.       Patient Instructions     Take steroid and antibiotic as directed for next 5 days. Use inhalers/nebulizer as prescribed for next week. Use flonase with nasal saline up to twice daily for congestion, tylenol for pain/fever, and coricidin/robitussin as needed for cough. Follow-up with PCP in 3-5 days if no improvement of symptoms. Report to the ER sooner if symptoms worsen.    Chief Complaint     Chief Complaint   Patient presents with    Cough     Pt with cough, wheezing, headache x6 days. Covid at home tests negative. Has hx of asthma. Using inhaler, nebulizer. Neb treatments are .was vomiting last night from coughing         History of Present Illness       59 year old female presents for evaluation of cough and congestion x 5 days. She denies any known sick contacts but reports h/o asthma and sleep apnea. She has not been using her  CPAP due to not feeling well. She reports having a fever last night but none this morning. She reports mildly productive sputum and SOB with coughing/exertion that is worse at night when lying down. She's been using her inhaler and nebulizer that is  in addition to taking flonase, benadryl, and codeine cough medication with minimal relief. Last inhaler use was this morning. She did multiple home COVID tests that were negative.     Cough  This is a new problem. The current episode started in the past 7 days. The problem has been unchanged. The problem occurs every few minutes. The cough is Non-productive. Associated symptoms include nasal congestion, postnasal drip, rhinorrhea and wheezing. Pertinent negatives include no chest pain, chills, ear congestion, ear pain, fever, headaches, heartburn, hemoptysis, myalgias, rash, sore throat, shortness of breath, sweats or weight loss. The symptoms are aggravated by lying down. She has tried OTC cough suppressant and a beta-agonist inhaler for the symptoms. The treatment provided mild relief. Her past medical history is significant for asthma. There is no history of environmental allergies.       Review of Systems   Review of Systems   Constitutional:  Positive for activity change and fatigue. Negative for appetite change, chills, fever and weight loss.   HENT:  Positive for congestion, postnasal drip and rhinorrhea. Negative for ear pain, sinus pressure, sinus pain, sneezing, sore throat and trouble swallowing.    Eyes:  Negative for visual disturbance.   Respiratory:  Positive for cough and wheezing. Negative for hemoptysis, chest tightness and shortness of breath.    Cardiovascular:  Negative for chest pain.   Gastrointestinal:  Negative for abdominal pain, constipation, diarrhea, heartburn, nausea and vomiting.   Musculoskeletal:  Negative for myalgias.   Skin:  Negative for rash.   Allergic/Immunologic: Negative for environmental allergies and food allergies.    Neurological:  Negative for dizziness, facial asymmetry and headaches.         Current Medications       Current Outpatient Medications:     albuterol (2.5 mg/3 mL) 0.083 % nebulizer solution, Take 3 mL (2.5 mg total) by nebulization every 6 (six) hours as needed for wheezing or shortness of breath for up to 7 days, Disp: 84 mL, Rfl: 0    albuterol (ProAir HFA) 90 mcg/act inhaler, Inhale 2 puffs every 6 (six) hours as needed for shortness of breath or wheezing, Disp: 8.5 g, Rfl: 0    amLODIPine (NORVASC) 2.5 mg tablet, Take 2.5 mg by mouth daily, Disp: , Rfl:     amLODIPine (NORVASC) 5 mg tablet, Take 5 mg by mouth daily, Disp: , Rfl:     aspirin 81 mg chewable tablet, Chew 81 mg daily, Disp: , Rfl:     atorvastatin (LIPITOR) 40 mg tablet, Take 1 tablet (40 mg total) by mouth daily, Disp: 90 tablet, Rfl: 3    azithromycin (ZITHROMAX) 250 mg tablet, Take 2 tablets today then 1 tablet daily x 4 days, Disp: 6 tablet, Rfl: 0    Cholecalciferol 50 MCG (2000 UT) CAPS, Take 1 capsule by mouth daily, Disp: , Rfl:     fenofibrate micronized (LOFIBRA) 67 MG capsule, Take 1 capsule (67 mg total) by mouth daily with breakfast, Disp: 90 capsule, Rfl: 3    fexofenadine (ALLEGRA) 180 MG tablet, Take 180 mg by mouth daily , Disp: , Rfl:     omeprazole (PriLOSEC) 20 mg delayed release capsule, Take 20 mg by mouth daily 2X DAILY, Disp: , Rfl:     oxybutynin (DITROPAN) 5 mg tablet, Take 5 mg by mouth 2 (two) times a day as needed, Disp: , Rfl:     predniSONE 20 mg tablet, Take 2 tablets (40 mg total) by mouth daily for 5 days, Disp: 10 tablet, Rfl: 0    topiramate (Topamax) 25 mg tablet, Take 2 tablets (50 mg total) by mouth daily at bedtime, Disp: 60 tablet, Rfl: 3    LORazepam (ATIVAN) 1 mg tablet, Take 1 tablet by mouth once the night prior to imaging.  Then, take 1 tablet by mouth once 1 to 2 hours the day of prior to imaging (Patient not taking: Reported on 10/9/2024), Disp: 2 tablet, Rfl: 0    omeprazole (PriLOSEC) 20 mg  delayed release capsule, Take 1 capsule (20 mg total) by mouth daily, Disp: 90 capsule, Rfl: 3    SUMAtriptan (IMITREX) 100 mg tablet, Take 1 tablet (100 mg total) by mouth once as needed for migraine may repeat in 2 hours if necessary. Max dose 200mg in 24 hour period. (Patient not taking: Reported on 10/9/2024), Disp: 10 tablet, Rfl: 1    Current Allergies     Allergies as of 10/15/2024 - Reviewed 10/15/2024   Allergen Reaction Noted    Cefuroxime Hives 2013            The following portions of the patient's history were reviewed and updated as appropriate: allergies, current medications, past family history, past medical history, past social history, past surgical history and problem list.     Past Medical History:   Diagnosis Date    Anxiety     Asthma     CPAP (continuous positive airway pressure) dependence     Hiatal hernia     Irritable bowel syndrome     Papanicolaou smear 2019    Neg    Sleep apnea        Past Surgical History:   Procedure Laterality Date    BREAST SURGERY Left      SECTION      COLONOSCOPY      DILATION AND CURETTAGE OF UTERUS      X2    ENDOMETRIAL ABLATION      MAMMO (HISTORICAL)  2019    SINUS SURGERY      X2    TUBAL LIGATION         Family History   Problem Relation Age of Onset    Brain cancer Father     Lung cancer Father     Cancer Father         Pharynx    Endometriosis Daughter     Breast cancer Other          Medications have been verified.        Objective   /70   Pulse 90   Temp (!) 97.4 °F (36.3 °C)   Resp 18   SpO2 97%        Physical Exam     Physical Exam  Vitals and nursing note reviewed.   Constitutional:       General: She is not in acute distress.     Appearance: Normal appearance. She is overweight.   HENT:      Head: Normocephalic.      Right Ear: Hearing, tympanic membrane, ear canal and external ear normal.      Left Ear: Hearing, tympanic membrane, ear canal and external ear normal.      Nose: Congestion and rhinorrhea  present. Rhinorrhea is clear.      Right Turbinates: Enlarged. Not swollen or pale.      Left Turbinates: Enlarged. Not swollen or pale.      Right Sinus: No maxillary sinus tenderness or frontal sinus tenderness.      Left Sinus: No maxillary sinus tenderness or frontal sinus tenderness.      Mouth/Throat:      Lips: Pink.      Mouth: Mucous membranes are moist.      Pharynx: Oropharynx is clear. Uvula midline. Posterior oropharyngeal erythema and postnasal drip present. No pharyngeal swelling, oropharyngeal exudate or uvula swelling.      Tonsils: No tonsillar exudate. 2+ on the right. 2+ on the left.   Eyes:      Conjunctiva/sclera: Conjunctivae normal.   Cardiovascular:      Rate and Rhythm: Normal rate and regular rhythm.      Pulses: Normal pulses.      Heart sounds: Normal heart sounds.   Pulmonary:      Effort: Pulmonary effort is normal. No tachypnea, accessory muscle usage or respiratory distress.      Breath sounds: Examination of the right-upper field reveals wheezing and rhonchi. Examination of the left-upper field reveals wheezing and rhonchi. Examination of the right-middle field reveals wheezing and rhonchi. Examination of the left-middle field reveals wheezing and rhonchi. Examination of the right-lower field reveals wheezing and rhonchi. Examination of the left-lower field reveals wheezing and rhonchi. Wheezing and rhonchi present.      Comments: Expiratory wheezing and rhonchi heard in all lung bases, most prominent in upper lobes  Musculoskeletal:      Cervical back: Full passive range of motion without pain, normal range of motion and neck supple.   Lymphadenopathy:      Cervical: No cervical adenopathy.   Skin:     General: Skin is warm and dry.   Neurological:      General: No focal deficit present.      Mental Status: She is alert and oriented to person, place, and time.   Psychiatric:         Mood and Affect: Mood normal.         Behavior: Behavior normal. Behavior is cooperative.          Thought Content: Thought content normal.         Judgment: Judgment normal.

## 2024-10-17 ENCOUNTER — APPOINTMENT (OUTPATIENT)
Dept: URGENT CARE | Facility: CLINIC | Age: 59
End: 2024-10-17

## 2024-10-17 ENCOUNTER — OCCMED (OUTPATIENT)
Dept: URGENT CARE | Facility: CLINIC | Age: 59
End: 2024-10-17

## 2024-10-17 DIAGNOSIS — Z02.1 PHYSICAL EXAM, PRE-EMPLOYMENT: Primary | ICD-10-CM

## 2024-10-18 ENCOUNTER — NURSE TRIAGE (OUTPATIENT)
Age: 59
End: 2024-10-18

## 2024-10-18 ENCOUNTER — TELEPHONE (OUTPATIENT)
Age: 59
End: 2024-10-18

## 2024-10-18 DIAGNOSIS — G43.701 CHRONIC MIGRAINE W/O AURA, NOT INTRACTABLE, W STAT MIGR: Primary | ICD-10-CM

## 2024-10-18 RX ORDER — DIVALPROEX SODIUM 500 MG/1
500 TABLET, DELAYED RELEASE ORAL
Qty: 5 TABLET | Refills: 0 | Status: SHIPPED | OUTPATIENT
Start: 2024-10-18 | End: 2024-10-23

## 2024-10-18 NOTE — TELEPHONE ENCOUNTER
Greg: I did inform the pt that message would be sent to you to review and that we would call back with your recommendations but pt was persistent about having an appt with you. I did offer some appts in November and later but pt declined and requested she be seen today or next week. RN also followed the migraine protocol and due to pt's symptoms/concerns and request; RN scheduled the pt.  Thank you.

## 2024-10-18 NOTE — TELEPHONE ENCOUNTER
- Will be sending the patient a prescription for Depakote 500 mg, she is to take 1 tablet by mouth once daily at bedtime for 5 days to abort migraine headaches as a short-term bridging therapy.    - Patient did not do well with sumatriptan and experienced shortness of breath and chest pain associated with the medication.  Based on other cardiac history would recommend that the patient not pursue any further triptan medications in the future.  Therefore, we will send a prescription for Nurtec 75 mg, take 1 tablet by mouth at the onset of migraine headache.  Max dose: 75 mg/day.    Greg Chaudhry PA-C  10/18/2024

## 2024-10-18 NOTE — TELEPHONE ENCOUNTER
Greg Chaudhry PA-C  You1 hour ago (9:49 AM)     Okay,    While the patient was only seen in the office a little bit longer than a month ago.  She probably needs to allow more time for the preventative medication to work as well so we will not make any adjustments there.  If she was having issues with the sumatriptan we could have just sent her a new medication rather than her have an appointment.  And if she is interested in having the Toradol injections that is one thing.  But this could have certainly been handled before putting her onto the schedule.  I will still see the patient if she would like to but at the same time I do not think that there be anything new to discuss besides sending her a different medication to try instead.  I will certainly see her but for future reference it may be something that can be discussed with the provider first and see if there could be anything done before scheduling the patient for a sooner appointment.    Sudha

## 2024-10-18 NOTE — TELEPHONE ENCOUNTER
[facility] states that [facility] needs PA    Called pt and made her aware of melchor's message and that we would submit PA for [facility] and call her once we hear back.      [phone] to leave detailed message    Please assist with [facility] PA

## 2024-10-18 NOTE — TELEPHONE ENCOUNTER
Warm transfer from [place]. Patient is returning Yenny's call. Patient is just looking for some relief and does not feel there need to be seen today.     Patient will not take Imitrex in the future and will be dropping it off in pharmacy disposable bin.     Cale, please send a new prescription to today. [id number], patient does not need for you to call her back.   Thank you!

## 2024-10-18 NOTE — TELEPHONE ENCOUNTER
Greg Chaudhry PA-C   to Alin Salazar RN       10/18/24 12:50 PM   Hello,    Please make patient aware that I did send 2 prescriptions to her pharmacy.  I sent Depakote 500 mg, she can take 1 tablet by mouth once daily at bedtime for up to 5 days to see if we could break out of her current headache cycle.  Since the patient was contraindicated to triptans and did not have a good experience with taking sumatriptan I had also placed an order for Nurtec as well.  See if we can rush the prior authorization for the patient's Nurtec to be able to get it in as soon as possible.  Although she should be able to do fine for the next couple days with taking Depakote I would hope that would break her out of her current headache cycle.  Thank you!    -Cale

## 2024-10-18 NOTE — TELEPHONE ENCOUNTER
Greg Chaudhry PA-C  You11 minutes ago (12:01 PM)     Okay, no worries would certainly understand that.  I am going to try to give the patient a call and let her know that I can certainly try to handle the complaint over the phone.  If she does want to come into the office and she is fine with her still having the appointment then that is okay like I said.  I will try to see if I can handle this with a phone call to the patient.  But if not that is certainly I will see her later in the day.    Sudha

## 2024-10-18 NOTE — TELEPHONE ENCOUNTER
Warm Transfer received from Neurology PEP.    Patient reported having daily headaches on left temple and above eye. Stated that the pain/pressure is also going into her sinuses and ear.  Reported in the last 3 months, pt has had 2 sinuses infections and is currently on Abx and a Prednisone Taper x 5 days for Bronchitis. (See PCP note 10/15)    Pt stopped taking the Imitrex due to side effects of SOB and chest pain. Advised pt not to take that medication anymore.  Stated that she is currently taking OTC Tylenol and Advil daily with minimal relief. RN educated pt on not taking the OTC meds more than x3/week.     Pt stated she established with a new PCP and discussed the side effects pt was having from the Imitrex but PCP advised pt to contact Neurology to discuss further.     Patient has tried and had none to minimal relief with the following:  --Imitrex: had side effects  --OTC Tylenol/Advil  --Cold Compress  --Laying in cool/dark room  --Sleeping/Resting     Pt requested to be seen sooner than January. RN scheduled pt for a FU appt today (10/18) with Greg Chaudhry PA-C at the [place] office at 2PM.    Pt is requesting a medication that will help relieve the migraine today and medication to take when a migraine starts again. Pt stated she okay with possibly getting a Toradol injection in the office today.    Greg: please review.         Reason for Disposition   Patient wants to be seen    Answer Assessment - Initial Assessment Questions  When did migraine start? Daily, x3 months ago    Location/Description: throbbing pain, unilateral in the left temporal area    Associated symptoms:nasal congestion, rhinorrhea, earache    Precipitating factors: pt states she believes it's her sinuses    Alleviating factors:  nothing is helping.     What medications have you tried for this migraine headache? cold packs, lying in a darkened room, prescription medications: none, sleep, and unable to obtain relief with OTC medications  Pt stated taking Tylenol and Advil daily with no relief. Educated pt not to take more than 3 times a week and no more than 3000 mg in 24 hour period.     Current migraine medications are confirmed as:  -- Topiramate 50 mg HS     Medications tried in the past?   Pt stated she never tried Decadron, Depakote, or Olanzapine in the past for headaches. Pt currently on a steroid taper for URI.    Protocols used: Headache-ADULT-[place]

## 2024-10-22 NOTE — TELEPHONE ENCOUNTER
nurtec denied as must tried and fail two triptans; records show tried one preferred triptan and need to try one more:  naratriptan, rizatriptan, zolmitriptan (denial letter scanned to media).    Please provide recommendation, thank you.

## 2024-10-22 NOTE — TELEPHONE ENCOUNTER
[facility] to check status of Nurtec PA.  Made him aware that it is denied and awaiting provider response.      Cale-please see mikaela's message regarding denial and advise

## 2024-10-23 ENCOUNTER — APPOINTMENT (OUTPATIENT)
Dept: LAB | Facility: IMAGING CENTER | Age: 59
End: 2024-10-23
Payer: COMMERCIAL

## 2024-10-23 DIAGNOSIS — J30.89 NON-SEASONAL ALLERGIC RHINITIS, UNSPECIFIED TRIGGER: ICD-10-CM

## 2024-10-23 PROCEDURE — 82785 ASSAY OF IGE: CPT

## 2024-10-23 PROCEDURE — 36415 COLL VENOUS BLD VENIPUNCTURE: CPT

## 2024-10-23 PROCEDURE — 86003 ALLG SPEC IGE CRUDE XTRC EA: CPT

## 2024-10-23 NOTE — TELEPHONE ENCOUNTER
Lila pharmacy tech from [facility] calling requesting for a peer to peer on behalf of pt. States patient called in [facility] looking for solutions on denial of Nurtec medication.   Lila can be reached @ [phone].    Please assist, and can we contact pt and update where we are with getting nurte approved. Thank you.

## 2024-10-23 NOTE — TELEPHONE ENCOUNTER
"Inbound call received from Patient warm transferred from [place]. Patient feels like she has been through a war, and said \"it has been a slow process.\" Jantent said insurance does not care. Patient said if she tries Maxalt and something happens she hopes her daughter gets a good  to leonard the insurance. Greg FERREIRA is the only one helping her. She is not refusing to try Maxalt but she just wants something that will work. Patient said she is willing to try Maxalt and will let the provider know if it makes her \"chest feel funny.\"    Patient had blood work done for ENT provider she is also seeing to help rule out things causing migraines.     Patient also is not sure if it is because she had two sinus infections or asthmatic bronchitis or the new medications but she has left lower rib pain. Patient said she feels like she has massive heart burn and is wondering if it  could be a side effect of Depakote. The pain is on her left side \"down where her ribs start\" and she is \"uncomfortable.\" She is not sure if she hurt her ribs coughing. The pain started a few days ago. Patient said the pain stays the same with a deep breath. Jerryt denies nausea, heart palpitations, or difficulty breathing. Patient said her vision is \"messed up\" because of migraines. Patient \"feels like she is in a fog\" clarified as visually. Vision is the same as it has been in the last 3 months. Patient said her vision is so bad she wonders if she should even drive.     Patient confirmed [phone] is the best call back and we may leave a detailed message if needed.     Greg FERREIRA please advise, thank you!      "

## 2024-10-23 NOTE — TELEPHONE ENCOUNTER
"Greg Chaudhry PA-C   to Me     10/23/24  3:57 PM   I don't think that should be a consequence of the Depakote what she is experiencing. If they chest tightness if so severe with the Triptans then she I would say is there anyway nursing can do an appeal on the Nurtec denial? That would be the only way. Also, yes with the chest tightness if she is really struggling that much and concerned with this and having the issues with her vision then I would recommend her to go too the ED to be evaluated further at that point. If she failed at home bridging med she may need a migraine cocktail. Also, should go too evaluate chest tightness as well. Thanks!    _Cale  ----------------------------------------------------    Outbound call made to Patient and she was made aware of provider's advisement.Patient verbalized understanding. Patient said when she got home she took Tylenol and Ibuprofen and the rib pain \"eased up.\" Arsh said she thinks its because she is getting over bronchitis and she must be sore from coughing up phlegm.     Patient said the medication divalproex sodium is helping her Migraines and she feels better today than she has in a while but she is concerned what she will do afterwards. Arsh said it has been three months and she still does not have anything for the migraines. Arsh clarified her vision she was describing occurs with the migraines and her vision is currently ok.     Arsh is aware to go to the ED for any worsening or concerning symptom.     Greg FERREIRA: Please be aware, thank you!    Team 4: Please see the message below regarding peer to peer and [facility]. Thank you!      "

## 2024-10-23 NOTE — TELEPHONE ENCOUNTER
Called pt. Left a detailed message on voice mail regarding the provider's note. Requested a return call to discuss.

## 2024-10-23 NOTE — TELEPHONE ENCOUNTER
Greg Chaudhry PA-C  Neurology Neurovascular Team 4Yesterday (9:00 AM)       Okay,    I would let the patient now and make her aware she may have to try Maxalt if they denied the sumatriptan. We can make the case she had reaction to sumatriptan and should not try other triptans as well. But she may have to try Maxalt first and see how that goes before the Nurtec.    Sudha

## 2024-10-24 LAB
A ALTERNATA IGE QN: <0.1 KUA/I
A FUMIGATUS IGE QN: <0.1 KUA/I
BERMUDA GRASS IGE QN: <0.1 KUA/I
BOXELDER IGE QN: <0.1 KUA/I
C HERBARUM IGE QN: <0.1 KUA/I
CAT DANDER IGE QN: 1.17 KUA/I
CMN PIGWEED IGE QN: <0.1 KUA/I
COMMON RAGWEED IGE QN: 3.52 KUA/I
COTTONWOOD IGE QN: <0.1 KUA/I
D FARINAE IGE QN: <0.1 KUA/I
D PTERONYSS IGE QN: <0.1 KUA/I
DOG DANDER IGE QN: 0.34 KUA/I
LONDON PLANE IGE QN: <0.1 KUA/I
MOUSE URINE PROT IGE QN: <0.1 KUA/I
MT JUNIPER IGE QN: <0.1 KUA/I
MUGWORT IGE QN: <0.1 KUA/I
P NOTATUM IGE QN: <0.1 KUA/I
ROACH IGE QN: <0.1 KUA/I
SHEEP SORREL IGE QN: <0.1 KUA/I
SILVER BIRCH IGE QN: 9.3 KUA/I
TIMOTHY IGE QN: 9.47 KUA/I
TOTAL IGE SMQN RAST: 258 KU/L (ref 0–113)
WALNUT IGE QN: <0.1 KUA/I
WHITE ASH IGE QN: <0.1 KUA/I
WHITE ELM IGE QN: <0.1 KUA/I
WHITE MULBERRY IGE QN: <0.1 KUA/I
WHITE OAK IGE QN: 0.18 KUA/I

## 2024-10-24 NOTE — TELEPHONE ENCOUNTER
Called [facility] and reopened the case for [facility]. Spoke with Marlin. Provided the following information. Quantity of #15 tabs/30 days. All triptan medications are contraindicated due to chest tightness/chest pain/shortness of breath. Case ID #[id number]. Should have determination in 24 hours.

## 2024-10-25 NOTE — TELEPHONE ENCOUNTER
Nurtec has been approved for a quantity of 15 tablets/ 30 days through 10/25/25. Approval letter is scanned under media tab of chart. Called [facility] and made the pharmacist aware of the approval. He was able to obtain a paid claim.    Called pt and left her a detailed message on voice mail making her aware.

## 2024-11-01 ENCOUNTER — TELEPHONE (OUTPATIENT)
Dept: NEUROLOGY | Facility: CLINIC | Age: 59
End: 2024-11-01

## 2024-11-11 ENCOUNTER — OFFICE VISIT (OUTPATIENT)
Dept: FAMILY MEDICINE CLINIC | Facility: CLINIC | Age: 59
End: 2024-11-11
Payer: COMMERCIAL

## 2024-11-11 VITALS
WEIGHT: 163 LBS | RESPIRATION RATE: 18 BRPM | TEMPERATURE: 98.1 F | SYSTOLIC BLOOD PRESSURE: 140 MMHG | DIASTOLIC BLOOD PRESSURE: 70 MMHG | HEIGHT: 63 IN | OXYGEN SATURATION: 96 % | HEART RATE: 89 BPM | BODY MASS INDEX: 28.88 KG/M2

## 2024-11-11 DIAGNOSIS — F32.A ANXIETY AND DEPRESSION: ICD-10-CM

## 2024-11-11 DIAGNOSIS — E78.2 MIXED HYPERLIPIDEMIA: ICD-10-CM

## 2024-11-11 DIAGNOSIS — G47.33 SEVERE OBSTRUCTIVE SLEEP APNEA: ICD-10-CM

## 2024-11-11 DIAGNOSIS — K21.9 GASTROESOPHAGEAL REFLUX DISEASE, UNSPECIFIED WHETHER ESOPHAGITIS PRESENT: ICD-10-CM

## 2024-11-11 DIAGNOSIS — F17.200 TOBACCO USE DISORDER: Primary | ICD-10-CM

## 2024-11-11 DIAGNOSIS — J45.31 MILD PERSISTENT ASTHMA WITH ACUTE EXACERBATION: ICD-10-CM

## 2024-11-11 DIAGNOSIS — I10 BENIGN ESSENTIAL HTN: ICD-10-CM

## 2024-11-11 DIAGNOSIS — L30.1 DYSHYDROSIS: ICD-10-CM

## 2024-11-11 DIAGNOSIS — F41.9 ANXIETY AND DEPRESSION: ICD-10-CM

## 2024-11-11 PROCEDURE — 99214 OFFICE O/P EST MOD 30 MIN: CPT | Performed by: FAMILY MEDICINE

## 2024-11-11 RX ORDER — FLUTICASONE FUROATE AND VILANTEROL 200; 25 UG/1; UG/1
1 POWDER RESPIRATORY (INHALATION) DAILY
Qty: 60 BLISTER | Refills: 5 | Status: SHIPPED | OUTPATIENT
Start: 2024-11-11 | End: 2024-11-12

## 2024-11-11 RX ORDER — EPINEPHRINE 0.3 MG/.3ML
INJECTION SUBCUTANEOUS
COMMUNITY
Start: 2024-11-08

## 2024-11-11 RX ORDER — PIMECROLIMUS 10 MG/G
CREAM TOPICAL 2 TIMES DAILY
Qty: 30 G | Refills: 1 | Status: SHIPPED | OUTPATIENT
Start: 2024-11-11

## 2024-11-11 RX ORDER — ALBUTEROL SULFATE 90 UG/1
2 INHALANT RESPIRATORY (INHALATION) EVERY 6 HOURS PRN
Qty: 8.5 G | Refills: 0 | Status: SHIPPED | OUTPATIENT
Start: 2024-11-11

## 2024-11-11 NOTE — ASSESSMENT & PLAN NOTE
Patient encouraged to quit using tobacco for that increases their risk for COPD, lung cancer,stroke, oral cancer and heart disease. If patient does not want to quit they should let me know  when they are interested in quitting. There are numerous options to use to quit and we can discuss them.

## 2024-11-11 NOTE — PROGRESS NOTES
Ambulatory Visit  Name: Edel Garrett      : 1965      MRN: [id number]  Encounter Provider: Zaid Herron MD  Encounter Date: 2024   Encounter department: [facility]    Assessment & Plan  Tobacco use disorder  Patient encouraged to quit using tobacco for that increases their risk for COPD, lung cancer,stroke, oral cancer and heart disease. If patient does not want to quit they should let me know  when they are interested in quitting. There are numerous options to use to quit and we can discuss them.          Severe obstructive sleep apnea  Patient instructed to continue using CPAP as directed to decrease episodes of apnea to minimize risk of cardiac complications. Pt instructed to kep  machine in clean working order and to call if any replacement parts are needed.          Mixed hyperlipidemia  Patient  is stable with current medication and we discussed a low fat low cholesterol diet. Weight loss also discussed for this will help lower cholesterol also. Recheck lipids in 6 months.          Gastroesophageal reflux disease, unspecified whether esophagitis present  Patient to continue with present therapy and decrease caffeine, avoid ETOH and smoking to decrease acid production. Pt should also cease eating prior to bedtime and avoid excessive fluid intake prior to sleep. May use antacids as needed for breakthrough GERD. All pateint questions answered today about this condition.          Benign essential HTN  Patient is stable with current anti-hypertensive medicine and continue to follow a low sodium diet and take current medication. All questions about this condition were answered today.          Anxiety and depression  Patient to continue utilizing medical therapy as well and counseling sources as applicable for condition. If  suicidal thought or fear of suicide to contact 911 and seek help immediately. Meds reviewed and patient questions answered today              History of Present Illness     59-year-old female who is new to our practice here today for establishing care with multimedical problems.  Patient with a history of asthma as well as dyshidrotic eczema in her hands.  Patient also with tobacco abuse and is a smoking but less than she was previously.  Patient also is seeing an allergist and is getting worked up for allergies and will be starting sensitization injections very shortly.  Also has a history of migraines and is doing well with that.  Patient also with some extra wheezing and shortness of breath since she moved out here from the [place] area and we are going to see about getting her some pulmonary function testing to see if she has any COPD overlap with her history of asthma.  Recommend the patient try and quit smoking.  She also with hypertension her blood pressure is well-controlled and she really should try and limit her intake of sodium.  She also history of hyperlipidemia as well as GERD and is stable with those problems.  Will see patient back after refills on her albuterol and we are going to see about getting her refill on her nebulizer because her nebulizer is dilapidated.  Also will get see about starting on some Breo because she is having some wheezing which I think may be related to allergies as well as may be some overlay with COPD that is undiagnosed we will see about getting some pulmonary function testing to see if that may be the case.  Will see patient back in [place] after PFTs are done to review that for her as well as her lab work that was ordered.          Review of Systems        Objective     There were no vitals taken for this visit.    Physical Exam

## 2024-11-12 ENCOUNTER — TELEPHONE (OUTPATIENT)
Age: 59
End: 2024-11-12

## 2024-11-12 DIAGNOSIS — J45.31 MILD PERSISTENT ASTHMA WITH ACUTE EXACERBATION: Primary | ICD-10-CM

## 2024-11-12 RX ORDER — BUDESONIDE AND FORMOTEROL FUMARATE DIHYDRATE 160; 4.5 UG/1; UG/1
2 AEROSOL RESPIRATORY (INHALATION) 2 TIMES DAILY
Qty: 10.2 G | Refills: 3 | Status: SHIPPED | OUTPATIENT
Start: 2024-11-12

## 2024-11-12 NOTE — TELEPHONE ENCOUNTER
Pt requested  advair inhaler , anoro inhaler, symbicort inhaler, xopenex inhaler or trelegy inhaler as they are covered by insurance and don't need a pre auth.        [facility]  [address] [place] [address]    Please have PCP send a script for one of the inhalers. Contact pt and tell her what PCP decided. Thank you for your help.

## 2024-11-14 ENCOUNTER — TELEPHONE (OUTPATIENT)
Age: 59
End: 2024-11-14

## 2024-11-14 NOTE — TELEPHONE ENCOUNTER
Belkis from [facility] needs a prior auth on the pimecrolimus (eidel) 1% cream and please call with updates as they are received so she can fill it for patient. Please call pharmacy when answer is received at [phone].

## 2024-11-15 ENCOUNTER — VBI (OUTPATIENT)
Dept: ADMINISTRATIVE | Facility: OTHER | Age: 59
End: 2024-11-15

## 2024-11-15 LAB
DME PARACHUTE DELIVERY DATE ACTUAL: NORMAL
DME PARACHUTE DELIVERY DATE REQUESTED: NORMAL
DME PARACHUTE ITEM DESCRIPTION: NORMAL
DME PARACHUTE ORDER STATUS: NORMAL
DME PARACHUTE SUPPLIER NAME: NORMAL
DME PARACHUTE SUPPLIER PHONE: NORMAL

## 2024-11-15 NOTE — TELEPHONE ENCOUNTER
PA pimecrolimus (ELIDEL) 1 % cream SUBMITTED     to [employer]     via    [x]CMM-KEY:  [id number]  []Surescripts-Case ID #    []Availity-Auth ID #  NDC #    []Faxed to plan   []Other website    []Phone call Case ID #      []PA sent as URGENT    All office notes, labs and other pertaining documents and studies sent. Clinical questions answered. Awaiting determination from insurance company.     Turnaround time for your insurance to make a decision on your Prior Authorization can take 7-21 business days.

## 2024-11-15 NOTE — TELEPHONE ENCOUNTER
PA pimecrolimus (ELIDEL) 1 % cream DENIED    Reason:(Screenshot if applicable)        Message sent to office clinical pool Yes    Denial letter scanned into Media Yes    Appeal started No (Provider will need to decide if appeal is warranted and send clinical documentation to Prior Authorization Team for initiation.)    **Please follow up with your patient regarding denial and next steps**

## 2024-11-15 NOTE — TELEPHONE ENCOUNTER
11/15/24 7:04 AM     Chart reviewed for Blood Pressure was/were submitted to the patient's insurance.     Dary Guardado   [facility]

## 2024-11-18 ENCOUNTER — TELEPHONE (OUTPATIENT)
Dept: FAMILY MEDICINE CLINIC | Facility: CLINIC | Age: 59
End: 2024-11-18

## 2024-11-19 ENCOUNTER — TELEPHONE (OUTPATIENT)
Age: 59
End: 2024-11-19

## 2024-11-19 NOTE — TELEPHONE ENCOUNTER
Pt called back because call had dropped and she wanted to make sure the person she was speaking to did not think she hung up on her.

## 2024-11-19 NOTE — TELEPHONE ENCOUNTER
"Received call from pharmacy to inform provider that order for pimecrolimus (ELIDEL) 1 % cream   requires prior authorization. If provider wants to submit for prior auth, send to \"[facility] post medication prior auth\" for 7-21 business day for response. If provider wants to order other mediation, then cancel and reorder.     Please update patient chart to reflect current PCP.  "

## 2024-11-19 NOTE — TELEPHONE ENCOUNTER
I am confused.Elidel is one of the meds that is covered yet it was denied??? That is what was ordered. Why was it denied yet it is stated it is one of the three choices recommended to be ordered?

## 2024-11-19 NOTE — TELEPHONE ENCOUNTER
Spoke with patient she will call the pharmacy and the insurance company and find out what is going on.

## 2024-11-20 NOTE — TELEPHONE ENCOUNTER
Duplicate encounter created, please see telephone encounter from 11/14 regarding Pimecrolimus cream  PA status. Please review patient's chart to see if there is already an encounter regarding the medication in question and to document anything regarding this medication in regards to anything regarding the authorization process etc before creating another encounter Thank You.

## 2024-11-21 NOTE — TELEPHONE ENCOUNTER
Pharmacy called regarding the prescription for pimecrolimus (ELIDEL) 1 % cream.Advised pharmacy that the prescription was denied.     Fernanda Sanabria MA   RR    11/19/24  9:34 AM  Note     This request was already denied and the patient was informed to call her insurance company.

## 2024-12-16 ENCOUNTER — OFFICE VISIT (OUTPATIENT)
Dept: FAMILY MEDICINE CLINIC | Facility: CLINIC | Age: 59
End: 2024-12-16
Payer: COMMERCIAL

## 2024-12-16 VITALS
HEART RATE: 98 BPM | WEIGHT: 167 LBS | HEIGHT: 63 IN | SYSTOLIC BLOOD PRESSURE: 130 MMHG | DIASTOLIC BLOOD PRESSURE: 72 MMHG | BODY MASS INDEX: 29.59 KG/M2 | TEMPERATURE: 98.4 F | RESPIRATION RATE: 20 BRPM | OXYGEN SATURATION: 99 %

## 2024-12-16 DIAGNOSIS — S46.819D STRAIN OF TRAPEZIUS MUSCLE, UNSPECIFIED LATERALITY, SUBSEQUENT ENCOUNTER: ICD-10-CM

## 2024-12-16 DIAGNOSIS — M54.50 CHRONIC BILATERAL LOW BACK PAIN WITHOUT SCIATICA: Primary | ICD-10-CM

## 2024-12-16 DIAGNOSIS — G89.29 CHRONIC BILATERAL LOW BACK PAIN WITHOUT SCIATICA: Primary | ICD-10-CM

## 2024-12-16 PROCEDURE — 99213 OFFICE O/P EST LOW 20 MIN: CPT | Performed by: FAMILY MEDICINE

## 2024-12-16 PROCEDURE — 96372 THER/PROPH/DIAG INJ SC/IM: CPT

## 2024-12-16 RX ORDER — NAPROXEN 500 MG/1
500 TABLET ORAL 2 TIMES DAILY WITH MEALS
Qty: 20 TABLET | Refills: 1 | Status: SHIPPED | OUTPATIENT
Start: 2024-12-16

## 2024-12-16 RX ORDER — CYCLOBENZAPRINE HCL 10 MG
10 TABLET ORAL 3 TIMES DAILY PRN
Qty: 30 TABLET | Refills: 0 | Status: SHIPPED | OUTPATIENT
Start: 2024-12-16

## 2024-12-16 RX ORDER — LIDOCAINE 50 MG/G
1 PATCH TOPICAL EVERY 24 HOURS
COMMUNITY
Start: 2024-12-11 | End: 2025-12-11

## 2024-12-16 RX ORDER — METOPROLOL TARTRATE 50 MG
TABLET ORAL
COMMUNITY
Start: 2024-11-12

## 2024-12-16 RX ORDER — KETOROLAC TROMETHAMINE 30 MG/ML
60 INJECTION, SOLUTION INTRAMUSCULAR; INTRAVENOUS ONCE
Status: COMPLETED | OUTPATIENT
Start: 2024-12-16 | End: 2024-12-16

## 2024-12-16 RX ORDER — DIAZEPAM 5 MG/1
5 TABLET ORAL EVERY 8 HOURS PRN
COMMUNITY
Start: 2024-12-15 | End: 2025-12-15

## 2024-12-16 RX ORDER — CYCLOBENZAPRINE HCL 5 MG
5-10 TABLET ORAL 3 TIMES DAILY PRN
COMMUNITY
Start: 2024-12-11 | End: 2024-12-21

## 2024-12-16 RX ADMIN — KETOROLAC TROMETHAMINE 60 MG: 30 INJECTION, SOLUTION INTRAMUSCULAR; INTRAVENOUS at 12:53

## 2024-12-16 NOTE — PROGRESS NOTES
"Name: Edel Garrett      : 1965      MRN: [id number]  Encounter Provider: Zaid Herron MD  Encounter Date: 2024   Encounter department: [facility]  :  Assessment & Plan  Chronic bilateral low back pain without sciatica    Orders:  •  ketorolac (TORADOL) 60 mg/2 mL IM injection 60 mg    Strain of trapezius muscle, unspecified laterality, subsequent encounter    Orders:  •  cyclobenzaprine (FLEXERIL) 10 mg tablet; Take 1 tablet (10 mg total) by mouth 3 (three) times a day as needed for muscle spasms  •  naproxen (Naprosyn) 500 mg tablet; Take 1 tablet (500 mg total) by mouth 2 (two) times a day with meals           History of Present Illness     HPI  Review of Systems   Constitutional:  Negative for activity change, appetite change, fatigue and fever.   HENT:  Negative for congestion, ear pain, postnasal drip, rhinorrhea, sinus pressure, sinus pain, sneezing and sore throat.    Eyes:  Negative for pain and redness.   Respiratory:  Negative for apnea, cough, chest tightness, shortness of breath and wheezing.    Cardiovascular:  Positive for chest pain. Negative for palpitations and leg swelling.   Gastrointestinal:  Negative for abdominal pain, constipation, diarrhea, nausea and vomiting.   Endocrine: Negative for cold intolerance and heat intolerance.   Genitourinary:  Negative for difficulty urinating, dysuria, frequency, hematuria and urgency.   Musculoskeletal:  Negative for arthralgias, back pain, gait problem and myalgias.   Skin:  Negative for rash.   Neurological:  Negative for dizziness, speech difficulty, weakness, numbness and headaches.   Hematological:  Does not bruise/bleed easily.   Psychiatric/Behavioral:  Negative for agitation, confusion and hallucinations.        Objective   /72 (BP Location: Left arm, Patient Position: Sitting, Cuff Size: Standard)   Pulse 98   Temp 98.4 °F (36.9 °C) (Temporal)   Resp 20   Ht 5' 2.5\" (1.588 m)   Wt 75.8 kg " (167 lb)   SpO2 99%   BMI 30.06 kg/m²      Physical Exam  Constitutional:       Appearance: Normal appearance. She is not ill-appearing.   HENT:      Head: Normocephalic and atraumatic.      Right Ear: Tympanic membrane normal.      Left Ear: Tympanic membrane normal.      Nose: Nose normal.      Mouth/Throat:      Mouth: Mucous membranes are moist.   Eyes:      Extraocular Movements: Extraocular movements intact.      Conjunctiva/sclera: Conjunctivae normal.      Pupils: Pupils are equal, round, and reactive to light.   Cardiovascular:      Rate and Rhythm: Normal rate and regular rhythm.   Pulmonary:      Effort: Pulmonary effort is normal. No respiratory distress.      Breath sounds: Normal breath sounds. No wheezing.   Abdominal:      General: Bowel sounds are normal.      Palpations: Abdomen is soft.      Tenderness: There is no abdominal tenderness.   Musculoskeletal:         General: Tenderness present. Normal range of motion.      Cervical back: Normal range of motion and neck supple.        Back:       Right lower leg: No edema.      Left lower leg: No edema.   Skin:     General: Skin is warm and dry.   Neurological:      Mental Status: She is alert and oriented to person, place, and time.   Psychiatric:         Mood and Affect: Mood normal.         Behavior: Behavior normal.         Thought Content: Thought content normal.         Judgment: Judgment normal.

## 2024-12-23 DIAGNOSIS — L30.1 DYSHYDROSIS: Primary | ICD-10-CM

## 2024-12-23 RX ORDER — TACROLIMUS 0.3 MG/G
OINTMENT TOPICAL 2 TIMES DAILY
Qty: 60 G | Refills: 1 | Status: SHIPPED | OUTPATIENT
Start: 2024-12-23

## 2024-12-30 ENCOUNTER — OFFICE VISIT (OUTPATIENT)
Dept: FAMILY MEDICINE CLINIC | Facility: CLINIC | Age: 59
End: 2024-12-30
Payer: COMMERCIAL

## 2024-12-30 VITALS
TEMPERATURE: 98.2 F | SYSTOLIC BLOOD PRESSURE: 144 MMHG | HEART RATE: 112 BPM | HEIGHT: 63 IN | DIASTOLIC BLOOD PRESSURE: 80 MMHG | BODY MASS INDEX: 29.77 KG/M2 | RESPIRATION RATE: 24 BRPM | WEIGHT: 168 LBS | OXYGEN SATURATION: 98 %

## 2024-12-30 DIAGNOSIS — M54.50 CHRONIC BILATERAL LOW BACK PAIN WITHOUT SCIATICA: Primary | ICD-10-CM

## 2024-12-30 DIAGNOSIS — G89.29 CHRONIC BILATERAL LOW BACK PAIN WITHOUT SCIATICA: Primary | ICD-10-CM

## 2024-12-30 PROCEDURE — 96372 THER/PROPH/DIAG INJ SC/IM: CPT | Performed by: FAMILY MEDICINE

## 2024-12-30 PROCEDURE — 99213 OFFICE O/P EST LOW 20 MIN: CPT | Performed by: FAMILY MEDICINE

## 2024-12-30 RX ORDER — KETOROLAC TROMETHAMINE 30 MG/ML
60 INJECTION, SOLUTION INTRAMUSCULAR; INTRAVENOUS ONCE
Status: COMPLETED | OUTPATIENT
Start: 2024-12-30 | End: 2024-12-30

## 2024-12-30 RX ORDER — KETOROLAC TROMETHAMINE 10 MG/1
10 TABLET, FILM COATED ORAL EVERY 6 HOURS PRN
Qty: 40 TABLET | Refills: 1 | Status: SHIPPED | OUTPATIENT
Start: 2024-12-30

## 2024-12-30 RX ADMIN — KETOROLAC TROMETHAMINE 60 MG: 30 INJECTION, SOLUTION INTRAMUSCULAR; INTRAVENOUS at 14:57

## 2024-12-30 NOTE — PROGRESS NOTES
"Name: Edel Garrett      : 1965      MRN: [id number]  Encounter Provider: Zaid Herron MD  Encounter Date: 2024   Encounter department: [facility]  :  Assessment & Plan  Chronic bilateral low back pain without sciatica    Orders:  •  ketorolac (TORADOL) 60 mg/2 mL IM injection 60 mg  •  ketorolac (TORADOL) 10 mg tablet; Take 1 tablet (10 mg total) by mouth every 6 (six) hours as needed for moderate pain           History of Present Illness     HPI  Review of Systems   Constitutional:  Negative for activity change, appetite change, fatigue and fever.   HENT:  Negative for congestion, ear pain, postnasal drip, rhinorrhea, sinus pressure, sinus pain, sneezing and sore throat.    Eyes:  Negative for pain and redness.   Respiratory:  Negative for apnea, cough, chest tightness, shortness of breath and wheezing.    Cardiovascular:  Negative for chest pain, palpitations and leg swelling.   Gastrointestinal:  Negative for abdominal pain, constipation, diarrhea, nausea and vomiting.   Endocrine: Negative for cold intolerance and heat intolerance.   Genitourinary:  Negative for difficulty urinating, dysuria, frequency, hematuria and urgency.   Musculoskeletal:  Positive for back pain. Negative for arthralgias, gait problem and myalgias.   Skin:  Negative for rash.   Neurological:  Negative for dizziness, speech difficulty, weakness, numbness and headaches.   Hematological:  Does not bruise/bleed easily.   Psychiatric/Behavioral:  Negative for agitation, confusion and hallucinations.        Objective   /80 (BP Location: Left arm, Patient Position: Sitting, Cuff Size: Standard)   Pulse (!) 112   Temp 98.2 °F (36.8 °C)   Resp (!) 24   Ht 5' 2.5\" (1.588 m)   Wt 76.2 kg (168 lb)   SpO2 98%   BMI 30.24 kg/m²      Physical Exam  Constitutional:       Appearance: Normal appearance. She is not ill-appearing.   HENT:      Head: Normocephalic and atraumatic.      Right Ear: " Tympanic membrane normal.      Left Ear: Tympanic membrane normal.      Nose: Nose normal.      Mouth/Throat:      Mouth: Mucous membranes are moist.   Eyes:      Extraocular Movements: Extraocular movements intact.      Conjunctiva/sclera: Conjunctivae normal.      Pupils: Pupils are equal, round, and reactive to light.   Cardiovascular:      Rate and Rhythm: Normal rate and regular rhythm.   Pulmonary:      Effort: Pulmonary effort is normal. No respiratory distress.      Breath sounds: Normal breath sounds. No wheezing.   Abdominal:      General: Bowel sounds are normal.      Palpations: Abdomen is soft.      Tenderness: There is no abdominal tenderness.   Musculoskeletal:         General: No tenderness. Normal range of motion.      Cervical back: Normal range of motion and neck supple.      Right lower leg: No edema.      Left lower leg: No edema.   Skin:     General: Skin is warm and dry.   Neurological:      Mental Status: She is alert and oriented to person, place, and time.   Psychiatric:         Mood and Affect: Mood normal.         Behavior: Behavior normal.         Thought Content: Thought content normal.         Judgment: Judgment normal.

## 2025-01-02 DIAGNOSIS — S46.819D STRAIN OF TRAPEZIUS MUSCLE, UNSPECIFIED LATERALITY, SUBSEQUENT ENCOUNTER: ICD-10-CM

## 2025-01-02 RX ORDER — CYCLOBENZAPRINE HCL 10 MG
10 TABLET ORAL 3 TIMES DAILY PRN
Qty: 30 TABLET | Refills: 0 | Status: SHIPPED | OUTPATIENT
Start: 2025-01-02

## 2025-01-02 NOTE — TELEPHONE ENCOUNTER
Reason for call:   [x] Refill   [] Prior Auth  [] Other:     Office:   [x] PCP/Provider - Gopal  [] Specialty/Provider -     Medication: Cyclobenzaprine 10mg    Dose/Frequency: 1 tab tid pms    Quantity: 30    Pharmacy: [id number] - [place] - [facility] [address] [phone]     Does the patient have enough for 3 days?   [] Yes   [x] No - taking last tablet now

## 2025-01-03 ENCOUNTER — CLINICAL SUPPORT (OUTPATIENT)
Dept: FAMILY MEDICINE CLINIC | Facility: CLINIC | Age: 60
End: 2025-01-03
Payer: COMMERCIAL

## 2025-01-03 DIAGNOSIS — S46.819D STRAIN OF TRAPEZIUS MUSCLE, UNSPECIFIED LATERALITY, SUBSEQUENT ENCOUNTER: Primary | ICD-10-CM

## 2025-01-03 PROCEDURE — 96372 THER/PROPH/DIAG INJ SC/IM: CPT | Performed by: FAMILY MEDICINE

## 2025-01-03 RX ORDER — NAPROXEN 500 MG/1
500 TABLET ORAL 2 TIMES DAILY WITH MEALS
Qty: 180 TABLET | Refills: 1 | Status: SHIPPED | OUTPATIENT
Start: 2025-01-03

## 2025-01-03 RX ORDER — KETOROLAC TROMETHAMINE 30 MG/ML
60 INJECTION, SOLUTION INTRAMUSCULAR; INTRAVENOUS ONCE
Status: COMPLETED | OUTPATIENT
Start: 2025-01-03 | End: 2025-01-03

## 2025-01-03 RX ADMIN — KETOROLAC TROMETHAMINE 60 MG: 30 INJECTION, SOLUTION INTRAMUSCULAR; INTRAVENOUS at 14:03

## 2025-01-08 ENCOUNTER — OFFICE VISIT (OUTPATIENT)
Dept: SLEEP CENTER | Facility: CLINIC | Age: 60
End: 2025-01-08
Payer: COMMERCIAL

## 2025-01-08 ENCOUNTER — TELEPHONE (OUTPATIENT)
Dept: SLEEP CENTER | Facility: CLINIC | Age: 60
End: 2025-01-08

## 2025-01-08 VITALS
HEART RATE: 84 BPM | OXYGEN SATURATION: 100 % | SYSTOLIC BLOOD PRESSURE: 121 MMHG | DIASTOLIC BLOOD PRESSURE: 70 MMHG | WEIGHT: 167.6 LBS | HEIGHT: 62 IN | BODY MASS INDEX: 30.84 KG/M2

## 2025-01-08 DIAGNOSIS — J45.20 MILD INTERMITTENT ASTHMA WITHOUT COMPLICATION: ICD-10-CM

## 2025-01-08 DIAGNOSIS — R68.2 DRY MOUTH: ICD-10-CM

## 2025-01-08 DIAGNOSIS — E66.9 OBESITY (BMI 30-39.9): ICD-10-CM

## 2025-01-08 DIAGNOSIS — K21.9 GASTROESOPHAGEAL REFLUX DISEASE, UNSPECIFIED WHETHER ESOPHAGITIS PRESENT: ICD-10-CM

## 2025-01-08 DIAGNOSIS — I10 ESSENTIAL HYPERTENSION: ICD-10-CM

## 2025-01-08 DIAGNOSIS — G47.19 EXCESSIVE DAYTIME SLEEPINESS: ICD-10-CM

## 2025-01-08 DIAGNOSIS — G47.9 SLEEP DISTURBANCE: ICD-10-CM

## 2025-01-08 DIAGNOSIS — G47.33 OBSTRUCTIVE SLEEP APNEA SYNDROME: Primary | ICD-10-CM

## 2025-01-08 DIAGNOSIS — F17.200 TOBACCO USE DISORDER: ICD-10-CM

## 2025-01-08 LAB
DME PARACHUTE DELIVERY DATE REQUESTED: NORMAL
DME PARACHUTE ITEM DESCRIPTION: NORMAL
DME PARACHUTE ORDER STATUS: NORMAL
DME PARACHUTE SUPPLIER NAME: NORMAL
DME PARACHUTE SUPPLIER PHONE: NORMAL

## 2025-01-08 PROCEDURE — 99214 OFFICE O/P EST MOD 30 MIN: CPT | Performed by: INTERNAL MEDICINE

## 2025-01-08 NOTE — PROGRESS NOTES
Follow-Up Note - Sleep Center   Edel Garrett  59 y.o. female  :1965  MRN:[id number]  DOS:2025    CC: I saw this patient for follow-up in clinic today for Sleep disordered breathing, Coexisting Sleep and Medical Problems.. Interval changes: She moved to a new home developed allergies since.    Results of prior studies in , The diagnostic study confirmed severe obstructive sleep apnea:  (AHI) of 51.1 events per hour of sleep.  The AHI in the supine position was 70.1.  The AHI during REM sleep was 0.8.  Moderate to loud intensity snoring was noted.The lowest oxygen saturation was 82.0%.  The amount of sleep time below 90% was 106.7 minutes.        During the subsequent therapeutic study, sleep disordered breathing was adequately remediated with positive airway pressure at 14 cm H2O. they will mild periodic limb movements of sleep and sleep maintenance insomnia.  Sleep efficiency was reduced at 81.2%    PFSH, Problem List, Medications & Allergies were reviewed in EMR.   She  has a past medical history of Anxiety, Asthma, CPAP (continuous positive airway pressure) dependence, Hiatal hernia, Irritable bowel syndrome, Papanicolaou smear (2019), and Sleep apnea.    She has a current medication list which includes the following prescription(s): albuterol, amlodipine, amlodipine, atorvastatin, budesonide-formoterol, cyclobenzaprine, epinephrine, fenofibrate micronized, fexofenadine, ketorolac, omeprazole, rimegepant sulfate, tacrolimus, topiramate, aspirin, cholecalciferol, cyclobenzaprine, diazepam, lidocaine, metoprolol tartrate, naproxen, and oxybutynin.    PHYSIOLOGICAL DATA REVIEW : Discontinued use of PAP around 6 months ago because of recurrent nasal and respiratory symptoms.  INTERPRETATION: Compliance is non- compliant;; ;     SUBJECTIVE: With respect to use of PAP, Edel  is experiencing some adverse effects: dry mouth/throat.She derives benefit and feels she sleeps better when using  "CPAP..    Sleep Routine: Edel reports getting 6-7 hrs sleep; she has no difficulty initiating or maintaining sleep . She arises needing an alarm and usually feels refreshed.Edel]reports excessive daytime sleepiness, feels like napping & does when has the opportunity.  She rated herself at Total score: 13 /24 on the [place] Sleepiness Scale.   Other issues: She is not aware of jerking limb movements during sleep.  She no has headache since starting Topamax.     Habits: Reports that she has been smoking cigarettes. She started smoking about 40 years ago. She has a 20 pack-year smoking history. She has been exposed to tobacco smoke. She has never used smokeless tobacco.,  Reports that she does not currently use alcohol.,  Reports no history of drug use., Caffeine use: limited until  ; Exercise routine: regular.      ROS: Significant for weight has been stable.  She has nasal and respiratory symptoms - she has asthma and is due to be tested for COPD.  Acid reflux is controlled on current medication.  Mood is stable off medication..    EXAM: /70 (BP Location: Right arm, Patient Position: Sitting, Cuff Size: Adult)   Pulse 84   Ht 5' 2\" (1.575 m)   Wt 76 kg (167 lb 9.6 oz)   SpO2 100%   BMI 30.65 kg/m²     Wt Readings from Last 3 Encounters:   01/08/25 76 kg (167 lb 9.6 oz)   12/30/24 76.2 kg (168 lb)   12/16/24 75.8 kg (167 lb)      Patient is well groomed; well appearing.   CNS: Alert, orientated, speech clear & coherent  Psych: cooperative and in no distress. Mental state: Appears normal.  H&N: EOMI; NC/AT: No facial pressure marks, no rashes.    Skin/Extrem: col & hydration normal; no edema  Resp: Respiratory effort is normal  Physical findings otherwise essentially unchanged from previous.    IMPRESSION: Problem List Items & Comorbidities Addressed this Visit    1. Obstructive sleep apnea syndrome  PAP DME Resupply/Reorder      2. Sleep disturbance        3. Excessive daytime sleepiness        4. " "Dry mouth        5. Gastroesophageal reflux disease, unspecified whether esophagitis present        6. Essential hypertension        7. Tobacco use disorder        8. Mild intermittent asthma without complication        9. Obesity (BMI 30-39.9)            PLAN:  I reviewed results of prior studies and physiologic data with the patient.   I discussed treatment options with risks and benefits.  Treatment with  PAP is medically necessary and Edel is agreable to reinitiate use.   Care of equipment, methods to improve comfort using PAP and importance of compliance with therapy were discussed.  Pressure setting: continue 9-13 cmH2O using a fullface mask.    Rx provided to replace supplies and Care coordinated with DME provider.   Discussed strategies for weight reduction.    I advised smoking cessation and she is contemplating.  Follow-up is advised in in 2 months to monitor progress, compliance and to adjust therapy.     Thank you for allowing me to participate in the care of this patient.    Sincerely,     Authenticated electronically on 01/08/25   Board Certified Specialist     Portions of the record may have been created with voice recognition software. Occasional wrong word or \"sound a like\" substitutions may have occurred due to the inherent limitations of voice recognition software. There may also be notations and random deletions of words or characters from malfunctioning software. Read the chart carefully and recognize, using context, where substitutions/deletions have occurred.    "

## 2025-02-07 ENCOUNTER — TELEPHONE (OUTPATIENT)
Age: 60
End: 2025-02-07

## 2025-02-07 NOTE — TELEPHONE ENCOUNTER
"Behavioral Health Outpatient Intake Questions    Referred By   : Neuro     Please advise interviewee that they need to answer all questions truthfully to allow for best care, and any misrepresentations of information may affect their ability to be seen at this clinic   => Was this discussed? Yes     If Minor Child (under age 18)    Who is/are the legal guardian(s) of the child?     Is there a custody agreement? No     If \"YES\"- Custody orders must be obtained prior to scheduling the first appointment  In addition, Consent to Treatment must be signed by all legal guardians prior to scheduling the first appointment    If \"NO\"- Consent to Treatment must be signed by all legal guardians prior to scheduling the first appointment    Behavioral Health Outpatient Intake History -     Presenting Problem (in patient's own words):     Panic attacks & anxiety.      Are there any communication barriers for this patient?     No                                               If yes, please describe barriers:  NONE   If there is a unique situation, please refer to Daniel Tracy/Marianela Kilgore for final determination.    Are you taking any psychiatric medications? No     If \"YES\" -What are they  NONE       If \"YES\" -Who prescribes?     Has the Patient previously received outpatient Talk Therapy or Medication Management from [facility]  No        If \"YES\"- When, Where and with Whom?         If \"NO\" -Has Patient received these services elsewhere?       If \"YES\" -When, Where, and with Whom?    Has the Patient abused alcohol or other substances in the last 6 months ? No  No concerns of substance abuse are reported.     If \"YES\" -What substance, How much, How often?     If illegal substance: Refer to [facility] (for NINA) or [facility] Off[facility].   If Alcohol in excess of 10 drinks per week:  Refer to [facility] (for [facility]INA) or [facility] Offices    Legal History-     Is this treatment court ordered? No   If \"yes \"send to :  Talk Therapy : " "Send to Daniel Tracy for final determination   Med Management: Send to Dr. Spencer for final determination     Has the Patient been convicted of a felony?  No   If \"Yes\" send to -When, What?  Talk Therapy: Send to Daniel Tracy for final determination   Med Management: Send to Dr. Spencer for final determination     ACCEPTED as a patient Yes  If \"Yes\" Appointment Date: 5/1/2025    Referred Elsewhere? No  If “Yes” - (Where? Ex: [facility], [facility], [facility], [facility], etc.)       Name of Insurance Co:[employer]   Insurance ID#[id number]  Insurance Phone #  If ins is primary or secondary?Primary  If patient is a minor, parents information such as Name, D.O.B of guarantor.  "

## 2025-02-07 NOTE — TELEPHONE ENCOUNTER
Contacted patient off of Medication Management  and Talk Therapy  to verify needs of services in attempts to offer patient an appointment. LVM for patient to contact intake dept  in regards to offer possible appointment at [phone] opt 3. 1st attempt

## 2025-02-14 ENCOUNTER — TELEPHONE (OUTPATIENT)
Dept: PSYCHIATRY | Facility: CLINIC | Age: 60
End: 2025-02-14

## 2025-02-14 NOTE — TELEPHONE ENCOUNTER
One week follow up call for New Patient appointment with Dr. Clark on 5/1/25  was made on 2/14/25. Writer informed patient of New Patient paperwork needing to be completed 5 days prior to the appointment. Writer confirmed paperwork has been sent via [employer].    Appointment was made on: 2/7/25

## 2025-03-06 ENCOUNTER — TELEPHONE (OUTPATIENT)
Dept: NEUROLOGY | Facility: CLINIC | Age: 60
End: 2025-03-06

## 2025-03-06 NOTE — TELEPHONE ENCOUNTER
Greg Chaudhry is returning to [place]. Called and lft msg that she was rescheduled from [place] to [place] office.

## 2025-03-17 ENCOUNTER — OFFICE VISIT (OUTPATIENT)
Dept: NEUROLOGY | Facility: CLINIC | Age: 60
End: 2025-03-17

## 2025-03-17 VITALS
SYSTOLIC BLOOD PRESSURE: 134 MMHG | WEIGHT: 167.8 LBS | BODY MASS INDEX: 30.69 KG/M2 | TEMPERATURE: 98 F | DIASTOLIC BLOOD PRESSURE: 76 MMHG

## 2025-03-17 DIAGNOSIS — F41.9 ANXIETY AND DEPRESSION: ICD-10-CM

## 2025-03-17 DIAGNOSIS — G43.009 MIGRAINE WITHOUT AURA AND WITHOUT STATUS MIGRAINOSUS, NOT INTRACTABLE: Primary | ICD-10-CM

## 2025-03-17 DIAGNOSIS — G47.33 SEVERE OBSTRUCTIVE SLEEP APNEA: ICD-10-CM

## 2025-03-17 DIAGNOSIS — F32.A ANXIETY AND DEPRESSION: ICD-10-CM

## 2025-03-17 NOTE — PATIENT INSTRUCTIONS
Headache Calendar  Please maintain a headache calendar  Consider using phone applications such as Migraine Buddy or Migraine Diary    Headache/migraine treatment:     Rescue medications (for immediate treatment of a headache):   It is ok to take ibuprofen, acetaminophen or naproxen (Advil, Tylenol,  Aleve, Excedrin) if they help your headaches you should limit these to No more than 3 times a week to avoid medication overuse/rebound headaches.      For your more moderate to severe migraines take this medication early   - Can continue to use Nurtec 75 mg as needed for abortive therapy    Prescription preventive medications for headaches/migraines   (to take every day to help prevent headaches - not to take at the time of headache):  - Can discontinue Topamax 25 mg nightly to see how she does without medication     *Typically these types of medications take time until you see the benefit, although some may see improvement in days, often it may take weeks, especially if the medication is being titrated up to a beneficial level. Please contact us if there are any concerns or questions regarding the medication.     Over the counter preventive supplements for headaches/migraines (if you try, try for 3 months straight)  (to take every day to help prevent headaches - not to take at the time of headache):  There are combo pills online of these - none of which regulated by FDA and double check dosing - take appropriate dose only once a day- prevent a migraine, migravent, mind ease, migrelief   [] Magnesium 400mg daily (If any diarrhea or upset stomach, decrease dose  as tolerated)  [] Riboflavin (Vitamin B2) 400mg daily (may make your urine bright/neon yellow)  - All supplements can be purchased online    Lifestyle Recommendations:  [x] SLEEP - Maintain a regular sleep schedule: Adults need at least 7-8 hours of uninterrupted a night. Maintain good sleep hygiene:  Going to bed and waking up at consistent times, avoiding  excessive daytime naps, avoiding caffeinated beverages in the evening, avoid excessive stimulation in the evening and generally using bed primarily for sleeping.  One hour before bedtime would recommend turning lights down lower, decreasing your activity (may read quietly, listen to music at a low volume). When you get into bed, should eliminate all technology (no texting, emailing, playing with your phone, iPad or tablet in bed).  [x] HYDRATION - Maintain good hydration.  Drink  2L of fluid a day (4 typical small water bottles)  [x] DIET - Maintain good nutrition. In particular don't skip meals and try and eat healthy balanced meals regularly.  [x] TRIGGERS - Look for other triggers and avoid them: Limit caffeine to 1-2 cups a day or less. Avoid dietary triggers that you have noticed bring on your headaches (this could include aged cheese, peanuts, MSG, aspartame and nitrates).  [x] EXERCISE - physical exercise as we all know is good for you in many ways, and not only is good for your heart, but also is beneficial for your mental health, cognitive health and  chronic pain/headaches. I would encourage at the least 5 days of physical exercise weekly for at least 30 minutes.     Education and Follow-up  [x] Please call with any questions or concerns. Of course if any new concerning symptoms go to the emergency department.  [x] Follow up in on an as needed basis with Cale CALI

## 2025-03-17 NOTE — ASSESSMENT & PLAN NOTE
I had the pleasure of seeing Edel today in the office at [facility] in [place].  She is presenting today for an office visit follow-up appointment in regard to her migraine headaches.  Patient states since the last appointment and stopping Effexor she has not had any significant migraine headaches.  Patient still taking Topamax 25 mg nightly.  Although the patient would like to come off of the Topamax entirely and see how she does.  Advised patient to discontinue Topamax and see if she has any significant improvement in headaches.  Can continue Nurtec 75 mg as needed for abortive therapy.  No other issues or concerns.  Advised patient to follow-up  on an as-needed basis as long as she was not having any headaches.        Patient contacted

## 2025-03-17 NOTE — PROGRESS NOTES
Name: Edel Garrett      : 1965      MRN: [id number]  Encounter Provider: Greg Chaudhry PA-C  Encounter Date: 3/17/2025   Encounter department: [facility]  :  Assessment & Plan  Migraine without aura and without status migrainosus, not intractable  I had the pleasure of seeing Edel today in the office at [facility] in [place].  She is presenting today for an office visit follow-up appointment in regard to her migraine headaches.  Patient states since the last appointment and stopping Effexor she has not had any significant migraine headaches.  Patient still taking Topamax 25 mg nightly.  Although the patient would like to come off of the Topamax entirely and see how she does.  Advised patient to discontinue Topamax and see if she has any significant improvement in headaches.  Can continue Nurtec 75 mg as needed for abortive therapy.  No other issues or concerns.  Advised patient to follow-up  on an as-needed basis as long as she was not having any headaches.       Anxiety and depression         Severe obstructive sleep apnea           Patient Instructions   Headache Calendar  Please maintain a headache calendar  Consider using phone applications such as Migraine Buddy or Migraine Diary    Headache/migraine treatment:     Rescue medications (for immediate treatment of a headache):   It is ok to take ibuprofen, acetaminophen or naproxen (Advil, Tylenol,  Aleve, Excedrin) if they help your headaches you should limit these to No more than 3 times a week to avoid medication overuse/rebound headaches.      For your more moderate to severe migraines take this medication early   - Can continue to use Nurtec 75 mg as needed for abortive therapy    Prescription preventive medications for headaches/migraines   (to take every day to help prevent headaches - not to take at the time of headache):  - Can discontinue Topamax 25 mg nightly to see how she  does without medication     *Typically these types of medications take time until you see the benefit, although some may see improvement in days, often it may take weeks, especially if the medication is being titrated up to a beneficial level. Please contact us if there are any concerns or questions regarding the medication.     Over the counter preventive supplements for headaches/migraines (if you try, try for 3 months straight)  (to take every day to help prevent headaches - not to take at the time of headache):  There are combo pills online of these - none of which regulated by FDA and double check dosing - take appropriate dose only once a day- prevent a migraine, migravent, mind ease, migrelief   [] Magnesium 400mg daily (If any diarrhea or upset stomach, decrease dose  as tolerated)  [] Riboflavin (Vitamin B2) 400mg daily (may make your urine bright/neon yellow)  - All supplements can be purchased online    Lifestyle Recommendations:  [x] SLEEP - Maintain a regular sleep schedule: Adults need at least 7-8 hours of uninterrupted a night. Maintain good sleep hygiene:  Going to bed and waking up at consistent times, avoiding excessive daytime naps, avoiding caffeinated beverages in the evening, avoid excessive stimulation in the evening and generally using bed primarily for sleeping.  One hour before bedtime would recommend turning lights down lower, decreasing your activity (may read quietly, listen to music at a low volume). When you get into bed, should eliminate all technology (no texting, emailing, playing with your phone, iPad or tablet in bed).  [x] HYDRATION - Maintain good hydration.  Drink  2L of fluid a day (4 typical small water bottles)  [x] DIET - Maintain good nutrition. In particular don't skip meals and try and eat healthy balanced meals regularly.  [x] TRIGGERS - Look for other triggers and avoid them: Limit caffeine to 1-2 cups a day or less. Avoid dietary triggers that you have noticed bring  on your headaches (this could include aged cheese, peanuts, MSG, aspartame and nitrates).  [x] EXERCISE - physical exercise as we all know is good for you in many ways, and not only is good for your heart, but also is beneficial for your mental health, cognitive health and  chronic pain/headaches. I would encourage at the least 5 days of physical exercise weekly for at least 30 minutes.     Education and Follow-up  [x] Please call with any questions or concerns. Of course if any new concerning symptoms go to the emergency department.  [x] Follow up in on an as needed basis with C[facility]     History of Present Illness   HPI     For Review:    The patient was last seen in the office on 09/13/2024.  Patient was initially seen for a new patient consultation in regard to headaches.  It was noted that the patient had developed persistent daily left-sided headaches that occurred over the and left temple area over the last 2 months.  She did have a significant past history of migraines.  She noted that the last time she received migraines was approximately 2012 or 2013 after menopause.  The patient noted that the headaches were occurring almost every day to an extended period difficult time finding relief with the headaches.  She noted that taking ibuprofen and Tylenol almost daily to treat the headaches did not help.  She had received sumatriptan again by her primary care provider but did not have the chance to take it.  She also notes that she received metoprolol from her PCP but had an adverse reaction to the medication.  She had since stopped taking metoprolol.  She did have a CT head without contrast recently which was ultimately unrevealing for any acute intracranial abnormality.  It was noted that the patient has not had an MRI of the brain thus far.  It appears that the patient was likely suffering from chronic migraine headaches without aura.  Did believe that this was likely multifactorial.  Patient noted that  her headaches had been increased and decreased consistently on her dose of Effexor extended release.  It did appear that the patient was on the lowest dose of the medication at times and sometimes at the higher end.  Recommended that the patient continue to remain off of Effexor and see how she does.  In the meantime recommended that the patient try Topamax for preventative therapy as well.  Recommended she continue to try to use sumatriptan for abortive therapy.  Although, patient ended up starting on Nurtec before her next follow-up appointment.  Had recommended checking ESR and CRP blood work to rule out temporal arteritis.  Recommended she have an MRI brain with and without contrast for further evaluation of the headaches.  MRI brain with and without contrast was normal and did not show any acute intracranial abnormalities.  ESR and CRP were within normal range as well.      Current medical illnesses:bilateral carotid artery stenosis, hypertension, coronary artery calcification, severe obstructive sleep apnea, asthma, GERD, irritable bowel syndrome, anxiety and depression, tobacco use disorder, mixed hyperlipidemia       What medications do you take or have you taken for your headaches?   Current Preventive:   Topamax    Current Abortive:   Nurtec    Prior Preventive:   Effexor- mg (anxiety), metoprolol (adverse reaction), Celexa, Lexapro (anxiety)     Prior Abortive:   Sumatriptan Motrin, Tylenol    Interval updates as of 4/23/2025:    She has not accounted for any severe headaches or migraines since completely stopping her Effexor-XR. Has been doing well with taking Topamax for preventative.  Feels as though she may be able to try and come off of Topamax as well.  Still has Nurtec for abortive therapy but has not needed to use it.  Discontinue sumatriptan since the last visit.    Review of Systems   Constitutional:  Negative for appetite change, fatigue and fever.   HENT:  Negative for hearing loss,  tinnitus, trouble swallowing and voice change.         Taste if off since starting meds     Eyes: Negative.  Negative for photophobia, pain and visual disturbance.   Respiratory: Negative.  Negative for shortness of breath.    Cardiovascular: Negative.  Negative for palpitations.   Gastrointestinal: Negative.  Negative for nausea and vomiting.   Endocrine: Negative.  Negative for cold intolerance.   Genitourinary: Negative.  Negative for dysuria, frequency and urgency.   Musculoskeletal:  Negative for back pain, gait problem, myalgias, neck pain and neck stiffness.   Skin: Negative.  Negative for rash.   Allergic/Immunologic: Negative.    Neurological: Negative.  Negative for dizziness, tremors, seizures, syncope, facial asymmetry, speech difficulty, weakness, light-headedness, numbness and headaches.   Hematological: Negative.  Does not bruise/bleed easily.   Psychiatric/Behavioral: Negative.  Negative for confusion, hallucinations and sleep disturbance.    All other systems reviewed and are negative.   I have personally reviewed the MA's review of systems and made changes as necessary.    Medical History Reviewed by provider this encounter:     .  Past Medical History   Past Medical History:   Diagnosis Date    Anxiety     Asthma     CPAP (continuous positive airway pressure) dependence     Hiatal hernia     Irritable bowel syndrome     Papanicolaou smear 2019    Neg    Sleep apnea      Past Surgical History:   Procedure Laterality Date    BREAST SURGERY Left      SECTION      COLONOSCOPY  2016    DILATION AND CURETTAGE OF UTERUS      X2    ENDOMETRIAL ABLATION      MAMMO (HISTORICAL)  2019    SINUS SURGERY      X2    TUBAL LIGATION       Family History   Problem Relation Age of Onset    Brain cancer Father     Lung cancer Father     Cancer Father         Pharynx    Endometriosis Daughter     Breast cancer Other       reports that she has been smoking cigarettes. She started smoking about 40  years ago. She has a 20.2 pack-year smoking history. She has been exposed to tobacco smoke. She has never used smokeless tobacco. She reports that she does not currently use alcohol. She reports that she does not use drugs.  Current Outpatient Medications   Medication Instructions    albuterol (PROVENTIL HFA,VENTOLIN HFA) 90 mcg/act inhaler 2 puffs, Inhalation, Every 6 hours PRN    amLODIPine (NORVASC) 5 mg, Daily    amLODIPine (NORVASC) 2.5 mg, Daily    aspirin 81 mg, Daily    atorvastatin (LIPITOR) 40 mg, Oral, Daily    budesonide-formoterol (Symbicort) 160-4.5 mcg/act inhaler 2 puffs, Inhalation, 2 times daily, Rinse mouth after use.    Cholecalciferol 50 MCG (2000 UT) CAPS 1 capsule, Daily    cyclobenzaprine (FLEXERIL) 5-10 mg, 3 times daily PRN    cyclobenzaprine (FLEXERIL) 10 mg, Oral, 3 times daily PRN    diazepam (VALIUM) 5 mg, Every 8 hours PRN    EPINEPHrine (EPIPEN) 0.3 mg/0.3 mL SOAJ     fenofibrate micronized (LOFIBRA) 67 mg, Oral, Daily with breakfast    fexofenadine (ALLEGRA) 180 mg, Daily    ketorolac (TORADOL) 10 mg, Oral, Every 6 hours PRN    lidocaine (LIDODERM) 5 % 1 patch, Every 24 hours    metoprolol tartrate (LOPRESSOR) 50 mg tablet     naproxen (NAPROSYN) 500 mg, Oral, 2 times daily with meals    omeprazole (PRILOSEC) 20 mg, Oral, Daily    oxybutynin (DITROPAN) 5 mg, Oral, 2 times daily PRN    rimegepant sulfate (NURTEC) 75 mg TBDP Take 1 tablet (75 mg) by mouth once at the onset of a headache. Max dose: 75 mg/day.    tacrolimus (PROTOPIC) 0.03 % ointment Topical, 2 times daily    topiramate (TOPAMAX) 25 mg tablet TAKE TWO TABLETS (50 MG TOTAL) BY MOUTH DAILY AT BEDTIME     Allergies   Allergen Reactions    Cefuroxime Hives      Current Outpatient Medications on File Prior to Visit   Medication Sig Dispense Refill    amLODIPine (NORVASC) 2.5 mg tablet Take 2.5 mg by mouth daily      amLODIPine (NORVASC) 5 mg tablet Take 5 mg by mouth daily      aspirin 81 mg chewable tablet Chew 81 mg daily       atorvastatin (LIPITOR) 40 mg tablet Take 1 tablet (40 mg total) by mouth daily 90 tablet 3    budesonide-formoterol (Symbicort) 160-4.5 mcg/act inhaler Inhale 2 puffs 2 (two) times a day Rinse mouth after use. 10.2 g 3    Cholecalciferol 50 MCG (2000 UT) CAPS Take 1 capsule by mouth daily      cyclobenzaprine (FLEXERIL) 10 mg tablet Take 1 tablet (10 mg total) by mouth 3 (three) times a day as needed for muscle spasms 30 tablet 0    EPINEPHrine (EPIPEN) 0.3 mg/0.3 mL SOAJ       fenofibrate micronized (LOFIBRA) 67 MG capsule Take 1 capsule (67 mg total) by mouth daily with breakfast 90 capsule 3    fexofenadine (ALLEGRA) 180 MG tablet Take 180 mg by mouth daily       ketorolac (TORADOL) 10 mg tablet Take 1 tablet (10 mg total) by mouth every 6 (six) hours as needed for moderate pain 40 tablet 1    omeprazole (PriLOSEC) 20 mg delayed release capsule Take 1 capsule (20 mg total) by mouth daily 90 capsule 3    rimegepant sulfate (NURTEC) 75 mg TBDP Take 1 tablet (75 mg) by mouth once at the onset of a headache. Max dose: 75 mg/day. 16 tablet 3    tacrolimus (PROTOPIC) 0.03 % ointment Apply topically 2 (two) times a day 60 g 1    cyclobenzaprine (FLEXERIL) 5 mg tablet Take 5-10 mg by mouth Three times daily as needed      diazepam (VALIUM) 5 mg tablet Take 5 mg by mouth every 8 (eight) hours as needed (Patient not taking: Reported on 4/22/2025)      lidocaine (LIDODERM) 5 % Place 1 patch on the skin every 24 hours (Patient not taking: Reported on 4/22/2025)      metoprolol tartrate (LOPRESSOR) 50 mg tablet  (Patient not taking: Reported on 1/8/2025)      naproxen (NAPROSYN) 500 mg tablet take 1 tablet by mouth twice a day with meals (Patient not taking: Reported on 4/22/2025) 180 tablet 1    oxybutynin (DITROPAN) 5 mg tablet Take 5 mg by mouth 2 (two) times a day as needed       No current facility-administered medications on file prior to visit.      Social History     Tobacco Use    Smoking status: Every Day      Current packs/day: 0.50     Average packs/day: 0.5 packs/day for 40.3 years (20.2 ttl pk-yrs)     Types: Cigarettes     Start date: 1985     Passive exposure: Past    Smokeless tobacco: Never   Vaping Use    Vaping status: Former   Substance and Sexual Activity    Alcohol use: Not Currently     Comment: very rarely    Drug use: Never    Sexual activity: Yes     Partners: Male     Birth control/protection: Female Sterilization        Objective   /76 (BP Location: Left arm, Patient Position: Sitting, Cuff Size: Large)   Temp 98 °F (36.7 °C) (Temporal)   Wt 76.1 kg (167 lb 12.8 oz)   BMI 30.69 kg/m²     Physical Exam  Neurological Exam    Physical Exam:                                                                 Vitals:            Constitutional:    /76 (BP Location: Left arm, Patient Position: Sitting, Cuff Size: Large)   Temp 98 °F (36.7 °C) (Temporal)   Wt 76.1 kg (167 lb 12.8 oz)   BMI 30.69 kg/m²   BP Readings from Last 3 Encounters:   04/21/25 128/80   03/17/25 134/76   01/08/25 121/70     Pulse Readings from Last 3 Encounters:   01/08/25 84   12/30/24 (!) 112   12/16/24 98         Well developed, well nourished, well groomed. No dysmorphic features.       Psychiatric:  Normal behavior and appropriate affect        Neurological Examination:     Mental status/cognitive function:   Orientated to time, place and person. Recent and remote memory intact. Attention span and concentration as well as fund of knowledge are appropriate for age. Normal language and spontaneous speech.    Cranial Nerves:  II-visual fields full.   III, IV, VI-Pupils were equal, round, and reactive to light and accomodation. Extraocular movements were full and conjugate without nystagmus. Conjugate gaze, normal smooth pursuits, normal saccades   V-facial sensation symmetric.    VII-facial expression symmetric, intact forehead wrinkle, strong eye closure, symmetric smile    VIII-hearing grossly intact bilaterally   IX,  X-palate elevation symmetric, no dysarthria.   XI-shoulder shrug strength intact    XII-tongue protrusion midline.    Motor Exam: symmetric bulk and tone throughout, no pronator drift. Power/strength 5/5 bilateral upper and lower extremities, no atrophy, fasciculations or abnormal movements noted.   Sensory: grossly intact light touch in all extremities.   Reflexes: brachioradialis 2+, biceps 2+, knee 2+ bilaterally  Coordination: Finger nose finger intact bilaterally, no apparent dysmetria, ataxia or tremor noted  Gait: steady casual and tandem gait.      Radiology Results Review: I have reviewed radiology reports from 09/24/2024 including: MRI brain.    Administrative Statements   I have spent a total time of 20 minutes in caring for this patient on the day of the visit/encounter including Diagnostic results, Risks and benefits of tx options, Instructions for management, Patient and family education, Importance of tx compliance, Risk factor reductions, Impressions, Counseling / Coordination of care, Documenting in the medical record, Reviewing/placing orders in the medical record (including tests, medications, and/or procedures), and Obtaining or reviewing history  .

## 2025-04-02 DIAGNOSIS — G44.52 NEW PERSISTENT DAILY HEADACHE: ICD-10-CM

## 2025-04-02 DIAGNOSIS — G43.701 CHRONIC MIGRAINE W/O AURA, NOT INTRACTABLE, W STAT MIGR: ICD-10-CM

## 2025-04-02 DIAGNOSIS — J45.31 MILD PERSISTENT ASTHMA WITH ACUTE EXACERBATION: ICD-10-CM

## 2025-04-02 RX ORDER — TOPIRAMATE 25 MG/1
TABLET, FILM COATED ORAL
Qty: 60 TABLET | Refills: 2 | Status: SHIPPED | OUTPATIENT
Start: 2025-04-02

## 2025-04-03 RX ORDER — ALBUTEROL SULFATE 90 UG/1
2 INHALANT RESPIRATORY (INHALATION) EVERY 6 HOURS PRN
Qty: 8.5 G | Refills: 3 | Status: SHIPPED | OUTPATIENT
Start: 2025-04-03

## 2025-04-08 ENCOUNTER — TELEPHONE (OUTPATIENT)
Age: 60
End: 2025-04-08

## 2025-04-10 NOTE — TELEPHONE ENCOUNTER
Nurtec has been denied. Denial letter is scanned under Media tab. Called [employer] to reopen the case. Spoke with Michelle. Made Michelle aware that the pt has been diagnosed by using the guidelines called the International Headache Society Classification of Headache Disorders System. All trptan class medications are contraindicated due to pt trying sumatriptan and developing chest tightness, chest pain, and shortness of breath. Case ID# [id number]. Pt's ID# [id number]. Awaiting determination.

## 2025-04-11 NOTE — TELEPHONE ENCOUNTER
Nurtec is approved through 4/10/2026. Called [facility] pharmacy and left a message for the pharmacist making them aware of the approval. Sent pt a message through [employer].

## 2025-04-21 ENCOUNTER — OFFICE VISIT (OUTPATIENT)
Dept: SLEEP CENTER | Facility: CLINIC | Age: 60
End: 2025-04-21
Payer: COMMERCIAL

## 2025-04-21 VITALS
BODY MASS INDEX: 30.62 KG/M2 | WEIGHT: 166.4 LBS | HEIGHT: 62 IN | SYSTOLIC BLOOD PRESSURE: 128 MMHG | DIASTOLIC BLOOD PRESSURE: 80 MMHG

## 2025-04-21 DIAGNOSIS — F17.200 TOBACCO USE DISORDER: ICD-10-CM

## 2025-04-21 DIAGNOSIS — G47.33 OBSTRUCTIVE SLEEP APNEA SYNDROME: Primary | ICD-10-CM

## 2025-04-21 DIAGNOSIS — G47.9 SLEEP DISTURBANCE: ICD-10-CM

## 2025-04-21 DIAGNOSIS — J45.20 MILD INTERMITTENT ASTHMA WITHOUT COMPLICATION: ICD-10-CM

## 2025-04-21 DIAGNOSIS — R68.2 DRY MOUTH: ICD-10-CM

## 2025-04-21 DIAGNOSIS — E66.9 OBESITY (BMI 30-39.9): ICD-10-CM

## 2025-04-21 DIAGNOSIS — G47.19 EXCESSIVE DAYTIME SLEEPINESS: ICD-10-CM

## 2025-04-21 DIAGNOSIS — Z91.199 NONCOMPLIANCE WITH CPAP TREATMENT: ICD-10-CM

## 2025-04-21 DIAGNOSIS — F32.A ANXIETY AND DEPRESSION: ICD-10-CM

## 2025-04-21 DIAGNOSIS — I10 ESSENTIAL HYPERTENSION: ICD-10-CM

## 2025-04-21 DIAGNOSIS — F41.9 ANXIETY AND DEPRESSION: ICD-10-CM

## 2025-04-21 DIAGNOSIS — K21.9 GASTROESOPHAGEAL REFLUX DISEASE, UNSPECIFIED WHETHER ESOPHAGITIS PRESENT: ICD-10-CM

## 2025-04-21 DIAGNOSIS — Z91.09 ENVIRONMENTAL ALLERGIES: ICD-10-CM

## 2025-04-21 PROCEDURE — 99214 OFFICE O/P EST MOD 30 MIN: CPT | Performed by: INTERNAL MEDICINE

## 2025-04-21 NOTE — PROGRESS NOTES
Name: Edel Garrett      : 1965      MRN: [id number]  Encounter Provider: Luis M Werner MD  Encounter Date: 2025   Encounter department: [facility] [place]  :  Assessment & Plan  Obstructive sleep apnea syndrome         Noncompliance with CPAP treatment         Sleep disturbance         Excessive daytime sleepiness         Anxiety and depression         Environmental allergies         Tobacco use disorder         Mild intermittent asthma without complication         Dry mouth         Gastroesophageal reflux disease, unspecified whether esophagitis present         Essential hypertension         Obesity (BMI 30-39.9)               PLAN:   Problems & Comorbidities Addressed this Visit as listed.  Above conditions as reviewed in notes are improved/stable/controlled/resolved.  I reviewed results of prior studies and physiologic data with the patient.   I discussed treatment options with risks and benefits.  Treatment with  PAP is medically necessary and Sky is agreable to continue use.   Care of equipment, methods to improve comfort using PAP and importance of compliance with therapy were discussed.  Pressure setting:continue 9-13 cmH2O. Mask type full face  Rx provided to replace supplies and Care coordinated with DME provider.   Multi component Cognitive behavioral therapy for Insomnia undertaken - Sleep Restriction, Stimulus control, Relaxation techniques and Sleep hygiene were discussed.  Nasal symptoms may improve with regular nasal saline rinse 1-2 times a day (Neilmed or Ayrs Sinus Rinse), followed by topical nasal steroid (e..g. OTC Flonase, Nasacort, Rhinocort) once a day if necessary.  Also advised smoking cessation.  It appears she will benefit from recommencing SSRI/SNRI.  She has an upcoming appointment with behavioral health.  Discussed strategies for weight reduction.    Follow-up is advised in 3 months to monitor progress, compliance and to adjust therapy.        History  of Present Illness   HPI          Follow-Up Note - Sleep Center   Edel Garrett  60 y.o. female  :1965  MRN:[id number]  DOS:2025    CC: I saw this patient for follow-up in clinic today for Sleep disordered breathing, Coexisting Sleep and Medical Problems.. Interval changes: She continues to have difficulty with her home situation that is limiting use of CPAP.      The diagnostic study confirmed severe obstructive sleep apnea:  (AHI) of 51.1 events per hour of sleep.  The AHI in the supine position was 70.1.  The AHI during REM sleep was 0.8.  Moderate to loud intensity snoring was noted.The lowest oxygen saturation was 82.0%.  The amount of sleep time below 90% was 106.7 minutes.        During the subsequent therapeutic study, sleep disordered breathing was adequately remediated with positive airway pressure at 14 cm H2O. they will mild periodic limb movements of sleep and sleep maintenance insomnia.  Sleep efficiency was reduced at 81.2%    PFSH, Problem List, Medications & Allergies were reviewed in EMR.   She  has a past medical history of Anxiety, Asthma, CPAP (continuous positive airway pressure) dependence, Hiatal hernia, Irritable bowel syndrome, Papanicolaou smear (2019), and Sleep apnea.    She has a current medication list which includes the following prescription(s): albuterol, amlodipine, amlodipine, atorvastatin, budesonide-formoterol, cyclobenzaprine, epinephrine, fenofibrate micronized, fexofenadine, ketorolac, omeprazole, rimegepant sulfate, tacrolimus, topiramate, aspirin, cholecalciferol, cyclobenzaprine, diazepam, lidocaine, metoprolol tartrate, naproxen, and oxybutynin.    PHYSIOLOGICAL DATA REVIEW : Device has been used 1/30 days an average was 4 hours and 38 minutes.   using PAP > 4 hours/night 3%. Estimated ART 1.5/hour with pressure of 12.5cm H2O@90th/95th percentile;.  INTERPRETATION: Compliance is non- compliant; Pressure setting is:adequate; ;     SUBJECTIVE: With respect  "to use of PAP, Edel  is experiencing some adverse effects: dry mouth/throat.She derives benefit.. Is satisfied with sleep and daytime function.     Sleep Routine: Edel reports getting 6-9 hrs sleep; she has  difficulty initiating and maintaining sleep .  She has racing thoughts that has gotten worse since she stopped effects of several months ago..  She arises by disturbance from ambient noise and never feels rested she is awoken by her  at 3:30 AM when he gets to work and she will goes back to sleep on the couch for several hours once he is left..Edel]reports excessive daytime sleepiness, feels like napping & does when has the opportunity and dozes off when sedentary at home.  She rated herself at Total score: 13 /24 on the [place] Sleepiness Scale.   Other issues: He is reporting increase allergies and asthma..     Habits: Reports that she has been smoking cigarettes. She started smoking about 40 years ago. She has a 20.2 pack-year smoking history. She has been exposed to tobacco smoke. She has never used smokeless tobacco.,  Reports that she does not currently use alcohol.,  Reports no history of drug use., Caffeine use: limited until  ; Exercise routine: sometimes.      ROS: Significant for weight has been stable.  Acid reflux has been controlled..  She has feelings of anxiety and depression.    EXAM: /80   Ht 5' 2\" (1.575 m)   Wt 75.5 kg (166 lb 6.4 oz)   BMI 30.43 kg/m²     Wt Readings from Last 3 Encounters:   04/21/25 75.5 kg (166 lb 6.4 oz)   03/17/25 76.1 kg (167 lb 12.8 oz)   01/08/25 76 kg (167 lb 9.6 oz)      Patient is well groomed; well appearing.   CNS: Alert, orientated, speech clear & coherent  Psych: cooperative and in no distress. Mental state: Appears anxious and constricted affect.  H&N: EOMI; NC/AT: No facial pressure marks, no rashes.    Skin/Extrem: col & hydration normal; no edema  Resp: Respiratory effort is normal  Physical findings otherwise essentially " "unchanged from previous.    Sincerely,     Authenticated electronically on 04/21/25   Board Certified Specialist     Portions of the record may have been created with voice recognition software. Occasional wrong word or \"sound a like\" substitutions may have occurred due to the inherent limitations of voice recognition software. There may also be notations and random deletions of words or characters from malfunctioning software. Read the chart carefully and recognize, using context, where substitutions/deletions have occurred.          Review of Systems           "

## 2025-04-22 ENCOUNTER — ANNUAL EXAM (OUTPATIENT)
Dept: OBGYN CLINIC | Facility: CLINIC | Age: 60
End: 2025-04-22
Payer: COMMERCIAL

## 2025-04-22 VITALS — BODY MASS INDEX: 30.22 KG/M2 | WEIGHT: 165.2 LBS

## 2025-04-22 DIAGNOSIS — Z12.31 ENCOUNTER FOR SCREENING MAMMOGRAM FOR MALIGNANT NEOPLASM OF BREAST: ICD-10-CM

## 2025-04-22 DIAGNOSIS — N95.2 ATROPHIC VAGINITIS: ICD-10-CM

## 2025-04-22 DIAGNOSIS — Z01.419 PAP SMEAR, AS PART OF ROUTINE GYNECOLOGICAL EXAMINATION: ICD-10-CM

## 2025-04-22 DIAGNOSIS — Z01.419 ENCOUNTER FOR GYNECOLOGICAL EXAMINATION WITHOUT ABNORMAL FINDING: Primary | ICD-10-CM

## 2025-04-22 PROCEDURE — 99396 PREV VISIT EST AGE 40-64: CPT | Performed by: OBSTETRICS & GYNECOLOGY

## 2025-04-22 NOTE — PROGRESS NOTES
Assessment/Plan:    No problem-specific Assessment & Plan notes found for this encounter.       Diagnoses and all orders for this visit:    Encounter for gynecological examination without abnormal finding    Pap smear, as part of routine gynecological examination    Encounter for screening mammogram for malignant neoplasm of breast    Atrophic vaginitis          Normal gynecological physical examination.  Self-breast examination stressed.  Mammogram ordered.  Discussed regular exercise, healthy diet, importance of vitamin D and calcium supplements.  Discussed importance of sun block use during periods of prolonged sun exposure.  Patient will be seen in 1 year for routine gynecologic and medical examination.  Patient will call office for any problems, concerns, or issues which may arise during the interim.     Subjective:          HPI    Patient ID: Edel Garrett is a 60 y.o. female who presents today for her annual gynecologic and medical examination    Menstrual status: Postmenopausal    Vasomotor symptoms: Denies    Patient reports normal appetite    Patient reports normal bowel and bladder habits    Patient denies any significant pelvic or abdominal pain    Patient denies any headaches, chest pain, shortness of breath fever shakes or chills    Patient denies any COVID 19 symptoms including cough or loss of taste or smell    COVID vaccine status: Patient aware of COVID-vaccine protocol.    Medical problems: Following for chronic migraines without aura, benign essential hypertension, asymptomatic carotid artery stenosis, coronary artery calcification, severe obstructive sleep apnea, asthma, IBS, GERD, anxiety, depression, and mixed hyperlipidemia.    Colonoscopy status: Colonoscopy up-to-date    Mammogram status: Stressed importance of self breast examination.  Patient has upcoming mammogram in June.    The following portions of the patient's history were reviewed and updated as appropriate: allergies, current  medications, past family history, past medical history, past social history, past surgical history and problem list.    Review of Systems   Constitutional: Negative.  Negative for appetite change, diaphoresis, fatigue, fever and unexpected weight change.   HENT: Negative.     Eyes: Negative.    Respiratory: Negative.          Patient followed for asthma and severe obstructive sleep apnea.   Cardiovascular: Negative.         Patient following up for coronary artery calcification, carotid artery stenosis, benign essential hypertension, and mixed hyperlipidemia.   Gastrointestinal: Negative.  Negative for abdominal pain, blood in stool, constipation, diarrhea, nausea and vomiting.        Patient following for IBS and GERD.   Endocrine: Negative.  Negative for cold intolerance and heat intolerance.   Genitourinary:  Negative for dysuria, frequency, hematuria, urgency, vaginal bleeding, vaginal discharge and vaginal pain.   Musculoskeletal: Negative.    Skin: Negative.    Allergic/Immunologic: Negative.    Neurological:  Positive for headaches.        Patient following for chronic migraines.   Hematological: Negative.  Negative for adenopathy.   Psychiatric/Behavioral: Negative.          Patient following for depression and anxiety.         Objective:      Wt 74.9 kg (165 lb 3.2 oz)   BMI 30.22 kg/m²          Physical Exam  Constitutional:       General: She is not in acute distress.     Appearance: Normal appearance. She is well-developed. She is not diaphoretic.   HENT:      Head: Normocephalic and atraumatic.   Eyes:      Pupils: Pupils are equal, round, and reactive to light.   Cardiovascular:      Rate and Rhythm: Normal rate and regular rhythm.      Heart sounds: Normal heart sounds. No murmur heard.     No friction rub. No gallop.   Pulmonary:      Effort: Pulmonary effort is normal.      Breath sounds: Normal breath sounds.   Chest:   Breasts:     Breasts are symmetrical.      Right: No inverted nipple, mass,  nipple discharge, skin change or tenderness.      Left: No inverted nipple, mass, nipple discharge, skin change or tenderness.   Abdominal:      General: Bowel sounds are normal.      Palpations: Abdomen is soft.   Genitourinary:     General: Normal vulva.      Exam position: Supine.      Labia:         Right: No rash or lesion.         Left: No rash or lesion.       Vagina: Normal. No vaginal discharge, erythema, tenderness or bleeding.      Cervix: No discharge or friability.      Uterus: Not enlarged and not tender.       Adnexa:         Right: No mass, tenderness or fullness.          Left: No mass, tenderness or fullness.        Rectum: Normal. Guaiac result negative.      Comments: Evidence of atrophic vaginitis.  Good pelvic support.  Musculoskeletal:         General: Normal range of motion.      Cervical back: Normal range of motion and neck supple.   Lymphadenopathy:      Cervical: No cervical adenopathy.      Upper Body:      Right upper body: No supraclavicular adenopathy.      Left upper body: No supraclavicular adenopathy.   Skin:     General: Skin is warm and dry.      Findings: No rash.   Neurological:      General: No focal deficit present.      Mental Status: She is alert and oriented to person, place, and time.   Psychiatric:         Mood and Affect: Mood normal.         Speech: Speech normal.         Behavior: Behavior normal.         Thought Content: Thought content normal.         Judgment: Judgment normal.

## 2025-04-22 NOTE — PROGRESS NOTES
Assessment/Plan:    No problem-specific Assessment & Plan notes found for this encounter.       Diagnoses and all orders for this visit:    Encounter for gynecological examination without abnormal finding  -     Thinprep Tis and HPV mRNA E6/E7    Pap smear, as part of routine gynecological examination    Encounter for screening mammogram for malignant neoplasm of breast    Atrophic vaginitis          Normal gynecological physical examination.  Self-breast examination stressed.  Mammogram ordered.  Discussed regular exercise, healthy diet, importance of vitamin D and calcium supplements.  Discussed importance of sun block use during periods of prolonged sun exposure.  Patient will be seen in 1 year for routine gynecologic and medical examination.  Patient will call office for any problems, concerns, or issues which may arise during the interim.     Subjective:          HPI    Patient ID: Edel Garrett is a 60 y.o. female who presents today for her annual gynecologic and medical examination    Menstrual status: Patient is postmenopausal and denies any vaginal bleeding    Vasomotor symptoms: Denies significant vasomotor symptoms    Patient reports normal appetite    Patient reports normal bowel and bladder habits    Patient denies any significant pelvic or abdominal pain    Patient denies any headaches, chest pain, shortness of breath fever shakes or chills    Patient denies any COVID 19 symptoms including cough or loss of taste or smell    COVID vaccine status: Aware COVID vaccination protocols    Medical problems: Followed medically for blood pressure and cholesterol    Colonoscopy status: Patient followed by GI and up-to-date with screening colonoscopy.    Mammogram status: Importance of self breast exam stressed to the patient she is up-to-date with screening mammography.  Appropriate arrangements for her annual screening mammogram are placed into the EMR system at today's visit.    The following portions of the  patient's history were reviewed and updated as appropriate: allergies, current medications, past family history, past medical history, past social history, past surgical history and problem list.    Review of Systems   Constitutional: Negative.  Negative for appetite change, diaphoresis, fatigue, fever and unexpected weight change.   HENT: Negative.     Eyes: Negative.    Respiratory: Negative.     Cardiovascular: Negative.         Followed for cholesterol and blood pressure   Gastrointestinal: Negative.  Negative for abdominal pain, blood in stool, constipation, diarrhea, nausea and vomiting.   Endocrine: Negative.  Negative for cold intolerance and heat intolerance.   Genitourinary: Negative.  Negative for dysuria, frequency, hematuria, urgency, vaginal bleeding, vaginal discharge and vaginal pain.   Musculoskeletal: Negative.    Skin: Negative.    Allergic/Immunologic: Negative.    Neurological: Negative.    Hematological: Negative.  Negative for adenopathy.   Psychiatric/Behavioral: Negative.           Objective:      Wt 74.9 kg (165 lb 3.2 oz)   BMI 30.22 kg/m²          Physical Exam  Constitutional:       General: She is not in acute distress.     Appearance: Normal appearance. She is well-developed. She is not diaphoretic.   HENT:      Head: Normocephalic and atraumatic.   Eyes:      Pupils: Pupils are equal, round, and reactive to light.   Cardiovascular:      Rate and Rhythm: Normal rate and regular rhythm.      Heart sounds: Normal heart sounds. No murmur heard.     No friction rub. No gallop.   Pulmonary:      Effort: Pulmonary effort is normal.      Breath sounds: Normal breath sounds.   Chest:   Breasts:     Breasts are symmetrical.      Right: No inverted nipple, mass, nipple discharge, skin change or tenderness.      Left: No inverted nipple, mass, nipple discharge, skin change or tenderness.   Abdominal:      General: Bowel sounds are normal.      Palpations: Abdomen is soft.   Genitourinary:      General: Normal vulva.      Exam position: Supine.      Labia:         Right: No rash or lesion.         Left: No rash or lesion.       Urethra: No urethral swelling or urethral lesion.      Vagina: Normal. No vaginal discharge, erythema, tenderness or bleeding.      Cervix: No discharge or friability.      Uterus: Not enlarged and not tender.       Adnexa:         Right: No mass, tenderness or fullness.          Left: No mass, tenderness or fullness.        Rectum: Normal. Guaiac result negative.      Comments: Pelvic exam revealed mild atrophic vaginitis  Good pelvic support confirmed  Musculoskeletal:         General: Normal range of motion.      Cervical back: Normal range of motion and neck supple.   Lymphadenopathy:      Cervical: No cervical adenopathy.      Upper Body:      Right upper body: No supraclavicular adenopathy.      Left upper body: No supraclavicular adenopathy.   Skin:     General: Skin is warm and dry.      Findings: No rash.   Neurological:      General: No focal deficit present.      Mental Status: She is alert and oriented to person, place, and time.   Psychiatric:         Mood and Affect: Mood normal.         Speech: Speech normal.         Behavior: Behavior normal.         Thought Content: Thought content normal.         Judgment: Judgment normal.

## 2025-04-25 LAB
CLINICAL INFO: NORMAL
CYTO CVX: NORMAL
CYTOLOGY CMNT CVX/VAG CYTO-IMP: NORMAL
DATE PREVIOUS BX: NORMAL
HPV E6+E7 MRNA CVX QL NAA+PROBE: NOT DETECTED
LMP START DATE: NORMAL
SL AMB PREV. PAP:: NORMAL
SPECIMEN SOURCE CVX/VAG CYTO: NORMAL

## 2025-05-01 ENCOUNTER — OFFICE VISIT (OUTPATIENT)
Dept: PSYCHIATRY | Facility: CLINIC | Age: 60
End: 2025-05-01
Payer: COMMERCIAL

## 2025-05-01 DIAGNOSIS — I10 BENIGN ESSENTIAL HTN: ICD-10-CM

## 2025-05-01 DIAGNOSIS — F33.2 SEVERE EPISODE OF RECURRENT MAJOR DEPRESSIVE DISORDER, WITHOUT PSYCHOTIC FEATURES (HCC): ICD-10-CM

## 2025-05-01 DIAGNOSIS — G43.701 CHRONIC MIGRAINE W/O AURA, NOT INTRACTABLE, W STAT MIGR: ICD-10-CM

## 2025-05-01 DIAGNOSIS — F40.10 SOCIAL ANXIETY DISORDER: Primary | ICD-10-CM

## 2025-05-01 PROCEDURE — 90792 PSYCH DIAG EVAL W/MED SRVCS: CPT | Performed by: STUDENT IN AN ORGANIZED HEALTH CARE EDUCATION/TRAINING PROGRAM

## 2025-05-01 RX ORDER — AMLODIPINE BESYLATE 10 MG/1
TABLET ORAL
COMMUNITY
Start: 2025-04-09

## 2025-05-01 RX ORDER — SUMATRIPTAN SUCCINATE 100 MG/1
TABLET ORAL
COMMUNITY
Start: 2025-04-02

## 2025-05-01 RX ORDER — SOLIFENACIN SUCCINATE 5 MG/1
TABLET, FILM COATED ORAL
COMMUNITY
Start: 2025-04-02

## 2025-05-01 RX ORDER — DICLOFENAC SODIUM 75 MG/1
75 TABLET, DELAYED RELEASE ORAL
COMMUNITY
Start: 2025-03-27 | End: 2026-03-27

## 2025-05-01 RX ORDER — PROPRANOLOL HYDROCHLORIDE 10 MG/1
10 TABLET ORAL 3 TIMES DAILY
Qty: 90 TABLET | Refills: 1 | Status: SHIPPED | OUTPATIENT
Start: 2025-05-01

## 2025-05-01 RX ORDER — PREDNISONE 10 MG/1
TABLET ORAL
COMMUNITY
Start: 2025-04-25 | End: 2025-05-07

## 2025-05-01 RX ORDER — VILAZODONE HYDROCHLORIDE 10 MG/1
10 TABLET ORAL
Qty: 30 TABLET | Refills: 1 | Status: SHIPPED | OUTPATIENT
Start: 2025-05-01

## 2025-05-01 RX ORDER — MIRABEGRON 50 MG/1
50 TABLET, FILM COATED, EXTENDED RELEASE ORAL DAILY
COMMUNITY
Start: 2025-04-01 | End: 2026-04-01

## 2025-05-01 RX ORDER — BUDESONIDE AND FORMOTEROL FUMARATE DIHYDRATE 80; 4.5 UG/1; UG/1
2 AEROSOL RESPIRATORY (INHALATION) 2 TIMES DAILY
COMMUNITY
Start: 2024-11-12

## 2025-05-01 NOTE — BH TREATMENT PLAN
TREATMENT PLAN (Medication Management Only)        [facility]    Name and Date of Birth:  Edel Garrett 60 y.o. 1965  MRN: [id number]  Date of Treatment Plan: May 1, 2025  Diagnosis/Diagnoses:    1. Social anxiety disorder    2. Severe episode of recurrent major depressive disorder, without psychotic features (HCC)    3. Benign essential HTN    4. Chronic migraine w/o aura, not intractable, w stat migr      Strengths/Personal Resources for Self-Care: supportive family, supportive friends, taking medications as prescribed, ability to adapt to life changes, ability to communicate needs, ability to communicate well, ability to listen, ability to reason, ability to understand psychiatric illness, average or above intelligence, family ties, financial means, financial security, general fund of knowledge, good physical health, good understanding of illness, independence, motivation for treatment, ability to negotiate basic needs, [employer]ational affiliation, being resoureceful, self-reliance, sense of humor, special hobby/interest, stable employment, strong cory, well educated, willingness to work on problems, work skills.  Area/Areas of need (in own words): anxiety symptoms, depressive symptoms, sleep problems, attention and concentration problems, difficulty tolerating medications, lack of energy, lack of motivation, low self-esteem, stress at work  1. Long Term Goal:   improve anxiety.  Target Date:6 months - 11/1/2025  Person/Persons responsible for completion of goal: Edel  2.  Short Term Objective (s) - How will we reach this goal?:   A.  Provider new recommended medication/dosage changes and/or continue medication(s): Medication changes: I have discontinued Edel Garrett's diazepam, lidocaine, metoprolol tartrate, ketorolac, naproxen, and topiramate. I am also having her start on propranolol and vilazodone. Additionally, I am having her maintain her  fexofenadine, aspirin, Cholecalciferol, oxybutynin, atorvastatin, fenofibrate micronized, omeprazole, rimegepant sulfate, EPINEPHrine, budesonide-formoterol, tacrolimus, cyclobenzaprine, albuterol, budesonide-formoterol, amLODIPine, SUMAtriptan, solifenacin, predniSONE, Mirabegron ER, and diclofenac..  B.  Attend medication management appointments regularly. Keep all scheduled appointments. Take psychiatric medications responsibly. Attend psychotherapy regularly. Attend to Activities of Daily Living regularly. Avoid alcohol. Avoid drugs. Call family when needed. Call supportive people when needed..  C.  Call family when needed. Call supportive people when needed. Eat a healthy diet. Exercise regularly. Get regular sleep every night. Get to bed at the same time every night. Increase socialization with peers. Spend more time with friends and family. Take walks regularly. Think positively. Try breathing exercises. Try not to isoalte self. Try relaxation techniques. Try yoga. Visit family frequently. Visit supportive people..  Target Date:6 months - 11/1/2025  Person/Persons Responsible for Completion of Goal: Edel  Progress Towards Goals: starting treatment  Treatment Modality: medication management with psychotherapy every 4 weeks, referral for individual psychotherapy, medication education at every visit  Review due 180 days from date of this plan: 6 months - 11/1/2025  Expected length of service: ongoing treatment unless revised  My Physician and I have developed this plan together and I agree to work on the goals and objectives. I understand the treatment goals that were developed for my treatment.   Electronic Signatures: on file (unless signed below)    Gianni Clark MD 05/01/25

## 2025-05-01 NOTE — PSYCH
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 Description 09/18/2023 PAP Mask Interface Cushion, Full Face, 1 per 1 month    Annual Exam on 07/06/2023   Component Date Value    Case Report 07/06/2023                      Value:Gynecologic Cytology Report                       Case: [id number]                                  Authorizing Provider:  Sky Parra MD   Collected:           07/06/2023 1106              Ordering Location:     [facility] OB Received:            07/06/2023 1106                                     GYN                                                                          First Screen:          Kavitha A Steel, CT                                                       Specimen:    LIQUID-BASED PAP, SCREENING, Cervix                                                        Primary Interpretation 07/06/2023 Negative for intraepithelial lesion or malignancy     Specimen Adequacy 07/06/2023 Satisfactory for evaluation. Absence of endocervical/transformation zone component.     Additional Information 07/06/2023                      Value:[employer]'s FDA approved ,  and ThinPrep Imaging Duo System are                           utilized with  "strict adherence to the 's instruction manual to                           prepare gynecologic and non-gynecologic cytology specimens for the                           production of ThinPrep slides as well as for gynecologic ThinPrep imaging.                           These processes have been validated by our laboratory and/or by the                           .                          The Pap test is not a diagnostic procedure and should not be used as the                           sole means to detect cervical cancer. It is only a screening procedure to                           aid in the detection of cervical cancer and its precursors. Both                           false-negative and false-positive results have been experienced. Your                           patient's test result should be interpreted in this context together with                           the history and clinical findings.     CBC:   Lab Results   Component Value Date    WBC 6.41 08/21/2024    RBC 4.22 08/21/2024    HGB 13.0 08/21/2024    HCT 39.8 08/21/2024    MCV 94 08/21/2024     08/21/2024    MCH 30.8 08/21/2024    MCHC 32.7 08/21/2024    RDW 13.1 08/21/2024    MPV 10.2 08/21/2024    NEUTROABS 3.11 08/21/2024     CMP:   Lab Results   Component Value Date    SODIUM 138 12/11/2024    K 3.9 12/11/2024     12/11/2024    CO2 21 12/11/2024    AGAP 8 12/11/2024    BUN 17 12/11/2024    CREATININE 0.72 12/11/2024    GLUC 98 12/11/2024    CALCIUM 9.1 12/11/2024    AST 13 12/11/2024    ALT 12 12/11/2024    ALKPHOS 102 12/11/2024    TP 7.3 12/11/2024    ALB 4.0 12/11/2024    TBILI 0.4 12/11/2024    EGFR 96 12/11/2024     Lipid Profile: No results found for: \"CHOLESTEROL\", \"HDL\", \"TRIG\", \"LDLCALC\", \"NONHDLC\"  Hemoglobin A1C:   Lab Results   Component Value Date    HGBA1C 5.7 (H) 02/27/2021     02/27/2021     Thyroid Studies:   Lab Results   Component Value Date    FREET4 1.05 02/27/2021     Liver Enzymes: "   Lab Results   Component Value Date    AST 13 12/11/2024    ALT 12 12/11/2024    ALKPHOS 102 12/11/2024     ECG with report: No results found for this visit on 05/01/25 (from the past 1000 hours).  ECG   Lab Results   Component Value Date    VENTRATE 74 08/21/2024    ATRIALRATE 74 08/21/2024    PRINT 240 08/21/2024    QRSDINT 88 08/21/2024    QTINT 382 08/21/2024    PAXIS 65 08/21/2024    QRSAXIS 85 08/21/2024    TWAVEAXIS 88 08/21/2024     Imaging Studies: MRI thoracic spine wo contrast  Result Date: 4/23/2025  Narrative: Examination: MRI thoracic spine, without contrast INDICATION: Myofascial pain syndrome, mid back pain TECHNIQUE: Multiplanar multi sequential imaging performed the thoracic spine, without contrast. FINDINGS: Thoracic alignment is anatomic. There are robust endplate spurs noted along the right aspect of the T5, T6, T7, T8, T9, and T10 endplates. There is some patchy edema noted within the marrow along the endplates at T5, T6, T7, T8, T9. Most extensive marrow edema at the T9 level. Also some edema noted along the costovertebral junctions on the right side at T5-6, T6-7, T7-8, which could reflect underlying arthropathy. 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 Syrinx is largest in size at the T6 level measuring 2.5 mm x 2.1 cm. No evidence of disc herniation is seen. The spinal canal and neural foramina appear widely patent.    Impression: IMPRESSION: 1.  There are robust endplate spurs noted along the right aspect of the T5, T6, T7, T8, T9, and T10 endplates. There is some patchy edema noted within the marrow along the endplates at T5, T6, T7, T8, T9. Most extensive marrow edema at the T9 level. Also some edema noted along the costovertebral junctions on the right side at T5-6, T6-7, T7-8, which could reflect underlying arthropathy. 2.  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 Syrinx is largest in size at the T6 level measuring 2.5 mm  x 2.1 cm. Workstation:[id number]    Recent Imaging Studies: No results found.    Suicide/Homicide Risk Assessment:    Risk of Harm to Self:  The following ratings are based on assessment at the time of the interview  Demographic Risk Factors include:   Historical Risk Factors include: none  Recent Specific Risk Factors include: none  Protective Factors: no current suicidal ideation  Weapons/Firearms: gun. The following steps have been taken to ensure weapons are properly secured: locked  Based on today's assessment, Edel presents the following risk of harm to self: low    Risk of Harm to Others:  The following ratings are based on assessment at the time of the interview  Demographic Risk Factors include: none  Historical Risk Factors include: none  Recent Specific Risk Factors include: none  Protective Factors: no current homicidal ideation  Weapons/Firearms: gun. The following steps have been taken to ensure weapons are properly secured: locked  Based on today's assessment, Edel presents the following risk of harm to others: low    The following interventions are recommended: Continue medication management. No other intervention changes indicated at this time.    Treatment Plan:    Completed and signed during the session: Yes - with Edel.    Depression Follow-up Plan Completed: Yes. Depression Screening Follow-up Plan: Patient's depression screening was positive with a PHQ-9 score of 14. Patient with underlying depression and was advised to continue current medications as prescribed. Patient assessed for underlying major depression. They have no active suicidal ideations. Brief counseling provided and recommend additional follow-up/re-evaluation next office visit.     Note Share: This note was not shared with the patient due to reasonable likelihood of causing patient harm    Administrative Statements   Administrative Statements   I have spent a total time of 60 minutes in caring for this patient  "on the day of the visit/encounter including Diagnostic results, Prognosis, Risks and benefits of tx options, Instructions for management, Patient and family education, Importance of tx compliance, Risk factor reductions, Impressions, Counseling / Coordination of care, Documenting in the medical record, Reviewing/placing orders in the medical record (including tests, medications, and/or procedures), and Obtaining or reviewing history  .    Visit Time  Visit Start Time: 10 AM  Visit Stop Time: 11 AM  Total Visit Duration:  60 minutes    Portions of the record may have been created with voice recognition software. Occasional wrong word or \"sound a like\" substitutions may have occurred due to the inherent limitations of voice recognition software. Read the chart carefully and recognize, using context, where substitutions have occurred.    Gianni Clark MD 05/01/25  "

## 2025-05-01 NOTE — ASSESSMENT & PLAN NOTE
Orders:    propranolol (INDERAL) 10 mg tablet; Take 1 tablet (10 mg total) by mouth 3 (three) times a day

## 2025-05-01 NOTE — BH CRISIS PLAN
Client Name: Edel Garertt       Client YOB: 1965    Juliano Safety Plan      Creation Date: 5/1/25 Update Date: 5/1/25   Created By: Gianni Clark MD Last Updated By: Gianni Clark MD      Step 1: Warning Signs:   Warning Signs   Panic attack            Step 2: Internal Coping Strategies:   Internal Coping Strategies   Music            Step 3: People and social settings that provide distraction:   Name Contact Information   watch TV     Places   Home           Step 4: People whom I can ask for help during a crisis:      Name Contact Information    Daughter       Step 5: Professionals or agencies I can contact during a crisis:      Clinican/Agency Name Phone Emergency Contact    Dr. Clark        Local Emergency Department Emergency Department Phone Emergency Department Address    [facility]          Crisis Phone Numbers:   Suicide Prevention Lifeline: Call or Text  862 Crisis Text Line: Text HOME to [phone]   Please note: Some [facility] do not have a separate number for Child/Adolescent specific crisis. If your county is not listed under Child/Adolescent, please call the adult number for your county      Adult Crisis Numbers: Child/Adolescent Crisis Numbers   [facility]: [phone] [facility]: [phone]   [facility]: [phone] [facility]: [phone]   [facility]: [phone] : [phone]   [facility]: [phone] [place]: [phone]   [facility]: [phone]   [facility]: [phone]   [facility]: [phone]   [facility]: [phone] (daytime) [phone] (after hours, weekends, holidays)      Step 6: Making the environment safer (plan for lethal means safety):   Plan: Gun is locked      Optional: What is most important to me and worth living for?   My daughter      Juliano Safety Plan. Natasha Spivey and Burke Clarke.  Used with permission of the authors.

## 2025-05-28 ENCOUNTER — TELEPHONE (OUTPATIENT)
Age: 60
End: 2025-05-28

## 2025-05-28 NOTE — TELEPHONE ENCOUNTER
Patient is calling regarding cancelling an appointment.    Date/Time: 6/3/25 at 9am    Reason: patient has to work     Patient was rescheduled: YES [x] NO []  If yes, when was Patient reschedule for: 6/3/25 at 1pm    Patient requesting call back to reschedule: YES [] NO [x]

## 2025-05-30 DIAGNOSIS — F33.2 SEVERE EPISODE OF RECURRENT MAJOR DEPRESSIVE DISORDER, WITHOUT PSYCHOTIC FEATURES (HCC): ICD-10-CM

## 2025-05-30 RX ORDER — VILAZODONE HYDROCHLORIDE 10 MG/1
10 TABLET ORAL
Qty: 30 TABLET | Refills: 0 | Status: SHIPPED | OUTPATIENT
Start: 2025-05-30 | End: 2025-06-03 | Stop reason: SDUPTHER

## 2025-06-03 ENCOUNTER — OFFICE VISIT (OUTPATIENT)
Dept: PSYCHIATRY | Facility: CLINIC | Age: 60
End: 2025-06-03
Payer: COMMERCIAL

## 2025-06-03 DIAGNOSIS — F40.10 SOCIAL ANXIETY DISORDER: Primary | ICD-10-CM

## 2025-06-03 DIAGNOSIS — F33.2 SEVERE EPISODE OF RECURRENT MAJOR DEPRESSIVE DISORDER, WITHOUT PSYCHOTIC FEATURES (HCC): ICD-10-CM

## 2025-06-03 PROCEDURE — 99214 OFFICE O/P EST MOD 30 MIN: CPT | Performed by: STUDENT IN AN ORGANIZED HEALTH CARE EDUCATION/TRAINING PROGRAM

## 2025-06-03 RX ORDER — VILAZODONE HYDROCHLORIDE 10 MG/1
10 TABLET ORAL
Qty: 90 TABLET | Refills: 0 | Status: SHIPPED | OUTPATIENT
Start: 2025-06-03

## 2025-06-03 NOTE — PSYCH
MEDICATION MANAGEMENT NOTE    Name: Edel Garrett      : 1965      MRN: [id number]  Encounter Provider: Gianni Clark MD  Encounter Date: 6/3/2025   Encounter department: [facility]    Insurance: Payor: [facility] / Plan: [facility]IOR[facility] / Product Type: TPA and Behav Hl[facility] /      Reason for Visit:   Chief Complaint   Patient presents with    Follow-up   :  Assessment & Plan  Severe episode of recurrent major depressive disorder, without psychotic features (HCC)    Orders:    vilazodone (VIIBRYD) 10 mg tablet; Take 1 tablet (10 mg total) by mouth daily with breakfast    Social anxiety disorder             Treatment Recommendations:  Continue Viibryd 10 mg and she can take double the dose if she felt ready for the dose change  Continue Inderal as needed 10 mg 3 times a day  Educated about diagnosis and treatment modalities. Verbalizes understanding and agreement with the treatment plan.  Discussed self monitoring of symptoms, and symptom monitoring tools.  Discussed medications and if treatment adjustment was needed or desired.  Aware of 24 hour and weekend coverage for urgent situations accessed by calling [facility] main practice number  I am scheduling this patient out for greater than 3 months: No    Medications Risks/Benefits:      Risks, Benefits And Possible Side Effects Of Medications:    Risks, benefits, and possible side effects of medications explained to Edel including risk of suicidality and serotonin syndrome related to treatment with antidepressants, risks of misuse, abuse or dependence, sedation and respiratory depression related to treatment with benzodiazepine medications, and risk of impaired next-day mental alertness, complex sleep-related behavior and dependence related to treatment with hypnotic medications. She (or legal representative) verbalizes understanding and agreement  for treatment.    Controlled Medication Discussion:     Edel has been filling controlled prescriptions on time as prescribed according to [place] Prescription Drug Monitoring Program.      History of Present Illness     Edel is seen today for a follow up for depression and anxiety. She continues to improve slowly since the last visit.  She was started on Viibryd 10 mg in the last visit as well as Inderal 10 mg 3 times a day.  She reports having watery diarrhea from Viibryd and the first 2 weeks of treatment but by the third week the side effect started to improve and her symptoms are getting better.  She feels that she is able to tolerate stress more than before and her anxiety and depression are getting better overall.    She denies suicidal ideation, intent or plan at present; denies homicidal ideation, intent or plan at present.    She denies auditory hallucinations, denies any visual hallucinations, denies any delusions.    She reports diarrhea.    HPI ROS Appetite Changes and Sleep:     She reports normal sleep, normal appetite, normal energy level    Review Of Systems: A review of systems is obtained and is negative except for the pertinent positives listed in HPI/Subjective above.      Current Rating Scores:     None completed today.    Areas of Improvement: reviewed in HPI/Subjective Section and reviewed in Assessment and Plan Section      Past Medical History[1]  Past Surgical History[2]  Allergies: Allergies[3]    Current Outpatient Medications   Medication Instructions    albuterol (PROVENTIL HFA,VENTOLIN HFA) 90 mcg/act inhaler 2 puffs, Inhalation, Every 6 hours PRN    amLODIPine (NORVASC) 10 mg tablet     aspirin 81 mg, Daily    atorvastatin (LIPITOR) 40 mg, Oral, Daily    budesonide-formoterol (Symbicort) 160-4.5 mcg/act inhaler 2 puffs, Inhalation, 2 times daily, Rinse mouth after use.    budesonide-formoterol (SYMBICORT) 80-4.5 MCG/ACT inhaler 2 puffs, 2 times daily    Cholecalciferol 50  MCG (2000 UT) CAPS 1 capsule, Daily    cyclobenzaprine (FLEXERIL) 10 mg, Oral, 3 times daily PRN    diclofenac (VOLTAREN) 75 mg    EPINEPHrine (EPIPEN) 0.3 mg/0.3 mL SOAJ     fenofibrate micronized (LOFIBRA) 67 mg, Oral, Daily with breakfast    fexofenadine (ALLEGRA) 180 mg, Daily    Mirabegron ER 50 mg, Daily    omeprazole (PRILOSEC) 20 mg, Oral, Daily    oxybutynin (DITROPAN) 5 mg, Oral, 2 times daily PRN    propranolol (INDERAL) 10 mg, Oral, 3 times daily    rimegepant sulfate (NURTEC) 75 mg TBDP Take 1 tablet (75 mg) by mouth once at the onset of a headache. Max dose: 75 mg/day.    solifenacin (VESICARE) 5 mg tablet     SUMAtriptan (IMITREX) 100 mg tablet     tacrolimus (PROTOPIC) 0.03 % ointment Topical, 2 times daily    vilazodone (VIIBRYD) 10 mg, Oral, Daily with breakfast        Substance Abuse History:    Tobacco, Alcohol and Drug Use History     Tobacco Use    Smoking status: Every Day     Current packs/day: 0.50     Average packs/day: 0.5 packs/day for 40.4 years (20.2 ttl pk-yrs)     Types: Cigarettes     Start date: 1985     Passive exposure: Past    Smokeless tobacco: Never   Vaping Use    Vaping status: Former   Substance Use Topics    Alcohol use: Not Currently     Comment: very rarely    Drug use: Never          Social History:    Social History     Socioeconomic History    Marital status:      Spouse name: Not on file    Number of children: Not on file    Years of education: Not on file    Highest education level: Not on file   Occupational History    Not on file   Other Topics Concern    Not on file   Social History Narrative    Guns presents at home.    Pets:  Cats        Family Psychiatric History:     Family History[4]    Medical History Reviewed by provider this encounter:          Objective   There were no vitals taken for this visit.     Mental Status Evaluation:    Appearance age appropriate, casually dressed   Behavior cooperative, calm   Speech normal rate, normal volume, normal  pitch, spontaneous   Mood euthymic   Affect normal range and intensity, appropriate   Thought Processes organized, goal directed   Thought Content no overt delusions   Perceptual Disturbances: no auditory hallucinations, no visual hallucinations   Abnormal Thoughts  Risk Potential Suicidal ideation - None  Homicidal ideation - None  Potential for aggression - No   Orientation oriented to person, place, time/date, and situation   Memory recent and remote memory grossly intact   Consciousness alert and awake   Attention Span Concentration Span attention span and concentration are age appropriate   Intellect appears to be of average intelligence   Insight intact   Judgement intact   Muscle Strength and  Gait normal muscle strength and normal muscle tone, normal gait and normal balance   Motor activity no abnormal movements   Language no difficulty naming common objects, no difficulty repeating a phrase, no difficulty writing a sentence   Fund of Knowledge adequate knowledge of current events  adequate fund of knowledge regarding past history  adequate fund of knowledge regarding vocabulary        Laboratory Results: I have personally reviewed all pertinent laboratory/tests results    Recent Labs (last 24 months):   Annual Exam on 04/22/2025   Component Date Value    DEBBIE PANDA CLINICAL INFORMAT* 04/22/2025 None given     LMP 04/22/2025 None given     Prev. PAP 04/22/2025 None given     Prev. BX 04/22/2025 None given     Source 04/22/2025 None given     Statement of Adequacy 04/22/2025      Interpretation/Result 04/22/2025 Cytology Results: Negative for intraepithelial lesion or malignancy.     Comment 04/22/2025 This Pap test has been evaluated with computer assisted technology.     DEBBIE PANDA CYTOTECHNOLOGIST: 04/22/2025      Comment 04/22/2025      HPV mRNA E6/E7 04/22/2025 Not Detected    Telephone on 01/08/2025   Component Date Value    Supp[facility] Name [facility]08/2025 [facility]     Supplier Phone Number 01/08/2025  [phone]     Order Status 01/08/2025 Processing     Delivery Request Date 01/08/2025 01/08/2025     Item Description 01/08/2025 CPAP and BiLevel Resupply Package, Fit to Comfort     Item Description 01/08/2025 Disposable PAP Filter, 2 per 1 month     Item Description 01/08/2025 Non-Disposable PAP Filter, 1 per 6 months     Item Description 01/08/2025 PAP Machine Tubing, Heated, 1 per 3 months     Item Description 01/08/2025 Humidifier Water Chamber, 1 per 6 months     Item Description 01/08/2025 PAP Headgear, 1 per 6 months    Office Visit on 11/11/2024   Component Date Value    Supplier Name 11/11/2024 [facility]     Supplier Phone Number 11/11/2024 [phone]     Order Status 11/11/2024 Delivery Successful     Delivery Request Date 11/11/2024 11/11/2024     Date Delivered  11/11/2024 11/13/2024     Item Description 11/11/2024 Nebulizer Compressor for Reusable Package with Mask     Item Description 11/11/2024 Nebulizer Set, Reusable     Item Description 11/11/2024 Adult Nebulizer Mask, 1 per 1 month a€“ Use as directed and discard 1 month after first use     Item Description 11/11/2024 Disposable Nebulizer Compressor Filter     Item Description 11/11/2024 Nebulizer Set, Disposable    Appointment on 10/23/2024   Component Date Value    A.ALTERNATA 10/23/2024 <0.10     A.FUMIGATUS 10/23/2024 <0.10     [place] Grass 10/23/2024 <0.10     [place]  10/23/2024 <0.10     Cat Epithellium-Dander 10/23/2024 1.17 (H)     C.HERBARUM 10/23/2024 <0.10     Cockroach 10/23/2024 <0.10     Common Silver Birch 10/23/2024 9.30 (H)     [place] 10/23/2024 <0.10     D. farinae 10/23/2024 <0.10     D. pteronyssinus 10/23/2024 <0.10     Dog Dander 10/23/2024 0.34 (H)     Elm IgE 10/23/2024 <0.10     [place] Tree 10/23/2024 <0.10     Mugwort 10/23/2024 <0.10     [place] Tree 10/23/2024 <0.10     Oak 10/23/2024 0.18 (H)     P.CHRYSOGENUM 10/23/2024 <0.10     Rough Pigweed  IgE 10/23/2024 <0.10      Common Ragweed 10/23/2024 3.52 (H)     Sheep Sorrel IgE 10/23/2024 <0.10     Harmony Tree 10/23/2024 <0.10     [place] Grass 10/23/2024 9.47 (H)     [place] Tree 10/23/2024 <0.10     [place] Tree 10/23/2024 <0.10     IgE 10/23/2024 258 (H)     MOUSE URINE 10/23/2024 <0.10    Appointment on 09/21/2024   Component Date Value    CLARKE SYNDROME DNA DEYANIRA* 09/21/2024 Not Detected     HEREDITARY BREAST & OVAR* 09/21/2024 Not Detected     FAMILIAL HYPERCHOLESTERO* 09/21/2024 Not Detected     BUN 09/21/2024 12     Creatinine 09/21/2024 0.80     eGFR 09/21/2024 81     Sed Rate 09/21/2024 26     CRP 09/21/2024 2.9    Admission on 08/21/2024, Discharged on 08/21/2024   Component Date Value    WBC 08/21/2024 6.41     RBC 08/21/2024 4.22     Hemoglobin 08/21/2024 13.0     Hematocrit 08/21/2024 39.8     MCV 08/21/2024 94     MCH 08/21/2024 30.8     MCHC 08/21/2024 32.7     RDW 08/21/2024 13.1     MPV 08/21/2024 10.2     Platelets 08/21/2024 317     nRBC 08/21/2024 0     Segmented % 08/21/2024 49     Immature Grans % 08/21/2024 0     Lymphocytes % 08/21/2024 36     Monocytes % 08/21/2024 9     Eosinophils Relative 08/21/2024 5     Basophils Relative 08/21/2024 1     Absolute Neutrophils 08/21/2024 3.11     Absolute Immature Grans 08/21/2024 0.01     Absolute Lymphocytes 08/21/2024 2.32     Absolute Monocytes 08/21/2024 0.57     Eosinophils Absolute 08/21/2024 0.34     Basophils Absolute 08/21/2024 0.06     Sodium 08/21/2024 139     Potassium 08/21/2024 3.6     Chloride 08/21/2024 104     CO2 08/21/2024 27     ANION GAP 08/21/2024 8     BUN 08/21/2024 11     Creatinine 08/21/2024 1.07     Glucose 08/21/2024 92     Calcium 08/21/2024 9.2     AST 08/21/2024 17     ALT 08/21/2024 25     Alkaline Phosphatase 08/21/2024 86     Total Protein 08/21/2024 7.1     Albumin 08/21/2024 3.8     Total Bilirubin 08/21/2024 0.20     eGFR 08/21/2024 57     Color, UA 08/21/2024 Yellow     Clarity, UA 08/21/2024 Clear     Specific [place], UA  08/21/2024 1.015     pH, UA 08/21/2024 6.5     Leukocytes, UA 08/21/2024 Negative     Nitrite, UA 08/21/2024 Negative     Protein, UA 08/21/2024 Negative     Glucose, UA 08/21/2024 Negative     Ketones, UA 08/21/2024 Negative     Urobilinogen, UA 08/21/2024 0.2     Bilirubin, UA 08/21/2024 Negative     Occult Blood, UA 08/21/2024 Negative     EXT Preg Test, Ur 08/21/2024 Negative     Control 08/21/2024 Valid     SARS-CoV-2 08/21/2024 Negative     INFLUENZA A PCR 08/21/2024 Negative     INFLUENZA B PCR 08/21/2024 Negative     RSV PCR 08/21/2024 Negative     Sed Rate 08/21/2024 27     Lyme Total Antibodies 08/21/2024 Negative     Ventricular Rate 08/21/2024 74     Atrial Rate 08/21/2024 74     MA Interval 08/21/2024 240     QRSD Interval 08/21/2024 88     QT Interval 08/21/2024 382     QTC Interval 08/21/2024 424     P Axis 08/21/2024 65     QRS Axis 08/21/2024 85     T Wave [place] 08/21/2024 88    Hospital Outpatient Visit on 03/01/2024   Component Date Value    Case Report 03/01/2024                      Value:Surgical Pathology Report                         Case: [id number]                                  Authorizing Provider:  Law Pérez MD              Collected:           03/01/2024 1317              Ordering Location:     [facility] Received:            03/01/2024 1449                                     [facility]                                                              Pathologist:           Tiera Frazier MD                                                                  Specimens:   A) - Esophagus, distal esophagus, cold bx                                                           B) - Esophagus, proximal esophagus, cold bx                                                Final Diagnosis 03/01/2024                      Value:A. Esophagus, Distal esophagus, Biopsy:                          - Benign squamous mucosa with nonspecific chronic inflammatory changes.                          -  Eosinophils are fewer than 3 cells per high power field in squamous                           epithelium.                          - Negative for dysplasia.                                                     B. Esophagus, Proximal esophagus, Biopsy:                          - Benign squamous mucosa with nonspecific chronic inflammatory changes.                          - Eosinophils are fewer than 3 cells per high power field in squamous                           epithelium.                          - Negative for dysplasia.      Additional Information 03/01/2024                      Value:All reported additional testing was performed with appropriately reactive                           controls.  These tests were developed and their performance                           characteristics determined by [facility] or                           appropriate performing facility, though some tests may be performed on                           tissues which have not been validated for performance characteristics                           (such as staining performed on alcohol exposed cell blocks and decalcified                           tissues).  Results should be interpreted with caution and in the context                           of the patients’ clinical condition. These tests may not be cleared or                           approved by the U.S. Food and Drug Administration, though the FDA has                           determined that such clearance or approval is not necessary. These tests                           are used for clinical purposes and they should not be regarded as                           investigational or for research. This laboratory has been approved by CLIA                           88, designated as a high-complexity laboratory and is qualified to perform                           these tests.                                                    Interpretation performed at  "[facility], [address] [facility]                           [address]    Gross Description 03/01/2024                      Value:A. The specimen is received in formalin, labeled with the patient's name                           and hospital number, and is designated \" distal esophagus, cold biopsy\".                            It consists of 4 irregular fragments of colorless soft tissue ranging from                           0.3 to 0.4 cm in greatest dimension.  The specimen is submitted in toto in                           screen cassette A1.                          B. The specimen is received in formalin, labeled with the patient's name                           and hospital number, and is designated \" proximal esophagus, cold biopsy\".                            It consists of 4 irregular fragments of pale-tan soft tissue ranging from                           0.3 to 0.7 cm in greatest dimension.  The specimen is submitted in toto in                           screen cassette B1.                                                    Note: The estimated total formalin fixation time based upon information                           provided by the submitting clinician and the standard processing schedule                           is under 72 hours. [place]oolittle   Telephone on 09/18/2023   Component Date Value    Supplier Name 09/18/2023 [facility]     Supplier Phone Number 09/18/2023 [phone]     Order Status 09/18/2023 Delivery Successful     Delivery Request Date 09/18/2023 09/18/2023     Date Delivered  09/18/2023 09/18/2023     Item Description 09/18/2023 Pressure Change     Item Description 09/18/2023 PAP Accessory     Item Description 09/18/2023 PAP Mask, Full Face, Fit Upon Setup, N/A, 1 per 3 months     Item Description 09/18/2023 Humidifier Water Chamber, 1 per 6 months     Item Description 09/18/2023 PAP Headgear, 1 per 6 months     Item Description 09/18/2023 PAP Chinstrap, " 1 per 6 months     Item Description 09/18/2023 PAP Humidifier, Heated     Item Description 09/18/2023 Heated PAP Tubing, 1 per 3 months     Item Description 09/18/2023 Disposable PAP Filter, 2 per 1 month     Item Description 09/18/2023 Non-Disposable PAP Filter, 1 per 6 months     Item Description 09/18/2023 PAP Mask Interface Cushion, Full Face, 1 per 1 month    Annual Exam on 07/06/2023   Component Date Value    Case Report 07/06/2023                      Value:Gynecologic Cytology Report                       Case: [id number]                                  Authorizing Provider:  Sky Parra MD   Collected:           07/06/2023 1106              Ordering Location:     [facility] Received:            07/06/2023 1106                                     GYN                                                                          First Screen:          Kavitha Steel, CT                                                       Specimen:    LIQUID-BASED PAP, SCREENING, Cervix                                                        Primary Interpretation 07/06/2023 Negative for intraepithelial lesion or malignancy     Specimen Adequacy 07/06/2023 Satisfactory for evaluation. Absence of endocervical/transformation zone component.     Additional Information 07/06/2023                      Value:[employer]'s FDA approved ,  and ThinPrep Imaging Duo System are                           utilized with strict adherence to the 's instruction manual to                           prepare gynecologic and non-gynecologic cytology specimens for the                           production of ThinPrep slides as well as for gynecologic ThinPrep imaging.                           These processes have been validated by our laboratory and/or by the                           .                          The Pap test is not a diagnostic procedure and should not be used as the                            sole means to detect cervical cancer. It is only a screening procedure to                           aid in the detection of cervical cancer and its precursors. Both                           false-negative and false-positive results have been experienced. Your                           patient's test result should be interpreted in this context together with                           the history and clinical findings.       Suicide/Homicide Risk Assessment:    Risk of Harm to Self:  The following ratings are based on assessment at the time of the interview  Demographic Risk Factors include:   Historical Risk Factors include: none  Recent Specific Risk Factors include: none  Protective Factors: no current suicidal ideation  Weapons/Firearms: none. The following steps have been taken to ensure weapons are properly secured: not applicable  Based on today's assessment, Edel presents the following risk of harm to self: low    Risk of Harm to Others:  The following ratings are based on assessment at the time of the interview  Demographic Risk Factors include: none  Historical Risk Factors include: none  Recent Specific Risk Factors include: none  Protective Factors: no current homicidal ideation  Weapons/Firearms: none. The following steps have been taken to ensure weapons are properly secured: not applicable  Based on today's assessment, Edel presents the following risk of harm to others: low    The following interventions are recommended: Continue medication management. No other intervention changes indicated at this time.    Psychotherapy Provided:     Individual psychotherapy provided: No    Treatment Plan:    Completed and signed during the session: Not applicable - Treatment Plan not due at this session.    Goals: Progress towards Treatment Plan goals - Yes, progressing, as evidenced by subjective findings in HPI/Subjective Section and in Assessment and Plan Section    Depression  "Follow-up Plan Completed: Not applicable      Administrative Statements   Administrative Statements   I have spent a total time of 15 minutes in caring for this patient on the day of the visit/encounter including Diagnostic results, Prognosis, Risks and benefits of tx options, Instructions for management, Patient and family education, Importance of tx compliance, Risk factor reductions, Impressions, Counseling / Coordination of care, Documenting in the medical record, Reviewing/placing orders in the medical record (including tests, medications, and/or procedures), and Obtaining or reviewing history  .    Visit Time  Visit Start Time: 1:00 pm  Visit Stop Time: 1:15 pm  Total Visit Duration: 15 minutes    Portions of the record may have been created with voice recognition software. Occasional wrong word or \"sound a like\" substitutions may have occurred due to the inherent limitations of voice recognition software. Read the chart carefully and recognize, using context, where substitutions have occurred.    Gianni Clark MD 25       [1]   Past Medical History:  Diagnosis Date    Anxiety     Asthma     CPAP (continuous positive airway pressure) dependence     Hiatal hernia     Irritable bowel syndrome     Papanicolaou smear 2019    Neg    Sleep apnea    [2]   Past Surgical History:  Procedure Laterality Date    BREAST SURGERY Left      SECTION      COLONOSCOPY  2016    DILATION AND CURETTAGE OF UTERUS      X2    ENDOMETRIAL ABLATION      MAMMO (HISTORICAL)  2019    SINUS SURGERY      X2    TUBAL LIGATION  1993   [3]   Allergies  Allergen Reactions    Cefuroxime Hives    Latex Rash     Also medical tape.   [4]   Family History  Problem Relation Name Age of Onset    Brain cancer Father      Lung cancer Father      Cancer Father          Pharynx    Endometriosis Daughter      Breast cancer Other MGGM      "

## 2025-06-12 DIAGNOSIS — E78.2 MIXED HYPERLIPIDEMIA: ICD-10-CM

## 2025-06-12 DIAGNOSIS — I10 BENIGN ESSENTIAL HTN: ICD-10-CM

## 2025-06-12 DIAGNOSIS — F40.10 SOCIAL ANXIETY DISORDER: ICD-10-CM

## 2025-06-12 DIAGNOSIS — J45.31 MILD PERSISTENT ASTHMA WITH ACUTE EXACERBATION: ICD-10-CM

## 2025-06-12 DIAGNOSIS — G43.701 CHRONIC MIGRAINE W/O AURA, NOT INTRACTABLE, W STAT MIGR: ICD-10-CM

## 2025-06-13 RX ORDER — PROPRANOLOL HYDROCHLORIDE 10 MG/1
10 TABLET ORAL 3 TIMES DAILY
Qty: 90 TABLET | Refills: 1 | Status: SHIPPED | OUTPATIENT
Start: 2025-06-13

## 2025-06-13 RX ORDER — ATORVASTATIN CALCIUM 40 MG/1
40 TABLET, FILM COATED ORAL DAILY
Qty: 30 TABLET | Refills: 0 | Status: SHIPPED | OUTPATIENT
Start: 2025-06-13

## 2025-06-13 RX ORDER — BUDESONIDE AND FORMOTEROL FUMARATE DIHYDRATE 160; 4.5 UG/1; UG/1
AEROSOL RESPIRATORY (INHALATION)
Qty: 10.2 G | Refills: 2 | Status: SHIPPED | OUTPATIENT
Start: 2025-06-13

## 2025-07-17 ENCOUNTER — HOSPITAL ENCOUNTER (OUTPATIENT)
Dept: RADIOLOGY | Facility: MEDICAL CENTER | Age: 60
Discharge: HOME | End: 2025-07-17
Attending: NURSE PRACTITIONER
Payer: COMMERCIAL

## 2025-07-17 VITALS — WEIGHT: 165 LBS | BODY MASS INDEX: 30.36 KG/M2 | HEIGHT: 62 IN

## 2025-07-17 DIAGNOSIS — Z00.00 ROUTINE ADULT HEALTH MAINTENANCE: ICD-10-CM

## 2025-07-17 PROCEDURE — 77063 BREAST TOMOSYNTHESIS BI: CPT

## 2025-07-17 PROCEDURE — 77067 SCR MAMMO BI INCL CAD: CPT

## 2025-08-07 ENCOUNTER — TELEPHONE (OUTPATIENT)
Age: 60
End: 2025-08-07

## 2025-08-08 ENCOUNTER — OFFICE VISIT (OUTPATIENT)
Dept: FAMILY MEDICINE CLINIC | Facility: CLINIC | Age: 60
End: 2025-08-08
Payer: COMMERCIAL

## 2025-08-08 VITALS
DIASTOLIC BLOOD PRESSURE: 74 MMHG | SYSTOLIC BLOOD PRESSURE: 112 MMHG | TEMPERATURE: 97.3 F | HEART RATE: 104 BPM | BODY MASS INDEX: 29.63 KG/M2 | WEIGHT: 161 LBS | OXYGEN SATURATION: 97 % | HEIGHT: 62 IN

## 2025-08-08 DIAGNOSIS — I65.23 ASYMPTOMATIC CAROTID ARTERY STENOSIS, BILATERAL: ICD-10-CM

## 2025-08-08 DIAGNOSIS — L30.1 DYSHIDROTIC ECZEMA: ICD-10-CM

## 2025-08-08 DIAGNOSIS — R42 DIZZINESS: Primary | ICD-10-CM

## 2025-08-08 DIAGNOSIS — J45.20 MILD INTERMITTENT ASTHMA WITHOUT COMPLICATION: ICD-10-CM

## 2025-08-08 PROCEDURE — 99214 OFFICE O/P EST MOD 30 MIN: CPT

## 2025-08-08 RX ORDER — BETAMETHASONE DIPROPIONATE 0.5 MG/G
CREAM TOPICAL 2 TIMES DAILY
Qty: 45 G | Refills: 0 | Status: SHIPPED | OUTPATIENT
Start: 2025-08-08

## 2025-08-08 RX ORDER — TOPIRAMATE 25 MG/1
TABLET, FILM COATED ORAL
COMMUNITY
Start: 2025-06-12

## 2025-08-08 RX ORDER — FLUTICASONE PROPIONATE 50 MCG
2 SPRAY, SUSPENSION (ML) NASAL DAILY
COMMUNITY
Start: 2025-08-08

## 2025-08-08 RX ORDER — PREDNISONE 20 MG/1
40 TABLET ORAL DAILY
Qty: 10 TABLET | Refills: 0 | Status: SHIPPED | OUTPATIENT
Start: 2025-08-08 | End: 2025-08-13

## 2025-08-08 RX ORDER — ALBUTEROL SULFATE 1.25 MG/3ML
1 SOLUTION RESPIRATORY (INHALATION) EVERY 6 HOURS PRN
Qty: 30 ML | Refills: 0 | Status: SHIPPED | OUTPATIENT
Start: 2025-08-08

## 2025-08-08 RX ORDER — AZITHROMYCIN 250 MG/1
TABLET, FILM COATED ORAL
Qty: 6 TABLET | Refills: 0 | Status: SHIPPED | OUTPATIENT
Start: 2025-08-08 | End: 2025-08-13

## 2025-08-09 ENCOUNTER — APPOINTMENT (OUTPATIENT)
Dept: LAB | Facility: CLINIC | Age: 60
End: 2025-08-09
Attending: NURSE PRACTITIONER
Payer: COMMERCIAL